# Patient Record
Sex: FEMALE | Race: WHITE | Employment: FULL TIME | URBAN - METROPOLITAN AREA
[De-identification: names, ages, dates, MRNs, and addresses within clinical notes are randomized per-mention and may not be internally consistent; named-entity substitution may affect disease eponyms.]

---

## 2018-07-20 ENCOUNTER — HOSPITAL ENCOUNTER (EMERGENCY)
Age: 33
Discharge: HOME | End: 2018-07-20
Payer: MEDICARE

## 2018-07-20 VITALS
OXYGEN SATURATION: 99 % | RESPIRATION RATE: 20 BRPM | BODY MASS INDEX: 36.88 KG/M2 | HEIGHT: 67 IN | SYSTOLIC BLOOD PRESSURE: 144 MMHG | TEMPERATURE: 97.1 F | WEIGHT: 235 LBS | HEART RATE: 89 BPM | DIASTOLIC BLOOD PRESSURE: 100 MMHG

## 2018-07-20 DIAGNOSIS — R22.0 LEFT FACIAL SWELLING: ICD-10-CM

## 2018-07-20 DIAGNOSIS — R03.0 ELEVATED BLOOD PRESSURE READING: ICD-10-CM

## 2018-07-20 DIAGNOSIS — K08.89 DENTALGIA: Primary | ICD-10-CM

## 2018-07-20 PROCEDURE — 99282 EMERGENCY DEPT VISIT SF MDM: CPT

## 2018-07-20 RX ORDER — HYDROCODONE BITARTRATE AND ACETAMINOPHEN 5; 325 MG/1; MG/1
1 TABLET ORAL EVERY 8 HOURS PRN
Qty: 15 TABLET | Refills: 0 | Status: SHIPPED | OUTPATIENT
Start: 2018-07-20 | End: 2018-07-25

## 2018-07-20 RX ORDER — CLINDAMYCIN HYDROCHLORIDE 300 MG/1
300 CAPSULE ORAL 3 TIMES DAILY
COMMUNITY
End: 2018-08-10

## 2018-07-20 ASSESSMENT — PAIN DESCRIPTION - ORIENTATION: ORIENTATION: LEFT

## 2018-07-20 ASSESSMENT — PAIN SCALES - GENERAL: PAINLEVEL_OUTOF10: 10

## 2018-07-20 ASSESSMENT — PAIN DESCRIPTION - LOCATION: LOCATION: TEETH

## 2018-07-20 NOTE — ED PROVIDER NOTES
Not on file. Social History Main Topics    Smoking status: Current Every Day Smoker     Packs/day: 1.00    Smokeless tobacco: Never Used    Alcohol use Yes      Comment: occasion    Drug use: Yes     Types: Marijuana      Comment: hasnt lately    Sexual activity: Not on file     Other Topics Concern    Not on file     Social History Narrative    No narrative on file     No current facility-administered medications for this encounter. Current Outpatient Prescriptions   Medication Sig Dispense Refill    clindamycin (CLEOCIN) 300 MG capsule Take 300 mg by mouth 3 times daily      HYDROcodone-acetaminophen (NORCO) 5-325 MG per tablet Take 1 tablet by mouth every 8 hours as needed for Pain for up to 5 days. . 15 tablet 0    benzocaine (ORAJEL) 10 % mucosal gel Take by mouth as needed. 1 Tube 0    ibuprofen (ADVIL;MOTRIN) 600 MG tablet Take 1 tablet by mouth every 6 hours as needed for Pain 40 tablet 0    norgestimate-ethinyl estradiol (SPRINTEC 28) 0.25-35 MG-MCG per tablet Take 1 tablet by mouth daily      FLUoxetine (PROZAC) 40 MG capsule Take 40 mg by mouth daily        Allergies   Allergen Reactions    Penicillins Nausea And Vomiting       REVIEW OF SYSTEMS  6 systems reviewed, pertinent positives per HPI otherwise noted to be negative    PHYSICAL EXAM  BP (!) 144/100   Pulse 89   Temp 97.1 °F (36.2 °C)   Resp 20   Ht 5' 7\" (1.702 m)   Wt 235 lb (106.6 kg)   SpO2 99%   Breastfeeding? No   BMI 36.81 kg/m²   GENERAL APPEARANCE: Awake and alert. Well-developed. Well-nourished. Non-toxic. Cooperative. No acute distress. HEAD: Normocephalic. Atraumatic. EYES: PERRL. EOM's grossly intact. Bilateral conjunctiva are clear and without exudate. ENT: Mucous membranes are moist. Posterior oropharynx is without erythema or exudate, uvula is midline. Bilateral tympanic membranes are clear. Tenderness with palpation to the left lower molars, primarily tooth #19.  No surrounding induration or fluctuance. No obvious abscess formation. Floor of the mouth is soft. Negative trismus. NECK: Supple. Normal ROM. No lymphadenopathy . CHEST: Equal symmetric chest rise. LUNGS: Breathing is unlabored. Speaking comfortably in full sentences. Equal, symmetric chest rise. Speaking comfortably in full sentences. Abdomen: Non distended  EXTREMITIES: MAEE. No acute deformities. SKIN: Warm and dry. Pink. Very mild left sided facial edema, no erythema or lymphangitic streaking. No acute rashes. NEUROLOGICAL: Alert and oriented x3. Strength is 5/5 in all extremities and sensation is intact. ED COURSE  I have independently evaluated this patient. Dr. Marline Aldana was available for consultation. Patient presents for evaluation of dental pain, no obvious abscess formation or erythema. Afebrile. Noted with elevated blood pressure. Vitals otherwise stable. There is mild left-sided cheek edema. Afebrile. Vitals are stable. No nausea or vomiting. Pain management was offered to the patient in the emergency department; however, she stated that she needed to be able to drive. A discussion was had with the patient regarding plan of care. Patient will be discharged home with a prescription for analgesics. Instructed that she may continue ibuprofen. Encouraged to continue taking the antibiotics as previously prescribed as well as warm/cold compresses. Instructed to follow up with dentist in 2-3 days. Patient was additionally encouraged to follow up with her primary care physician for repeat blood pressure check in several days. Certainly, the elevated blood pressure could be related to her discomfort. Return precautions were discussed. Patient verbalizes understanding, all questions were answered and is agreeable with the plan of care. Patient will follow up with dentist for further evaluation/treatment. Patient will return to ED for new/worsening symptoms.     Patient was given scripts for the following medications. I counseled patient how to take these medications. New Prescriptions    BENZOCAINE (ORAJEL) 10 % MUCOSAL GEL    Take by mouth as needed. HYDROCODONE-ACETAMINOPHEN (NORCO) 5-325 MG PER TABLET    Take 1 tablet by mouth every 8 hours as needed for Pain for up to 5 days. [facility]  No results found for this visit on 07/20/18. I estimate there is LOW risk for a ANAPHYLAXIS, DEEP SPACE INFECTION (e.g., EDWINAS ANGINA OR RETROPHARYNGEAL ABSCESS), EPIGLOTTITIS, MENINGITIS, or AIRWAY COMPROMISE, thus I consider the discharge disposition reasonable. Also, there is no evidence or peritonitis, sepsis, or toxicity. Janay Fong and I have discussed the diagnosis and risks, and we agree with discharging home to follow-up with their primary doctor. We also discussed returning to the Emergency Department immediately if new or worsening symptoms occur. We have discussed the symptoms which are most concerning (e.g., changing or worsening pain, trouble swallowing or breathing, neck stiffness or fever) that necessitate immediate return. Final Impression    1. Dentalgia    2. Elevated blood pressure reading    3. Left facial swelling        Discharge Vital Signs:  Blood pressure (!) 144/100, pulse 89, temperature 97.1 °F (36.2 °C), resp. rate 20, height 5' 7\" (1.702 m), weight 235 lb (106.6 kg), SpO2 99 %, not currently breastfeeding. DISPOSITION  Patient was discharged to home in good condition. DISCLAIMER:  Please note this report has been produced using speech recognition Dragon software and may contain errors related to that system including errors in grammar, punctuation, and spelling, as well as words and phrases that may be inappropriate. If there are any questions or concerns please feel free to contact the dictating provider for clarification.                                                                             Dariela Arevalo, JAYE - CNP  07/20/18 [id number]

## 2018-07-20 NOTE — ED NOTES
Discharge instructions reviewed with Ms. Dawna Levin. She verbalized understanding. Copy of discharge instructions and prescriptions given. Ms. Dawna Levin was discharged to home in good condition per personal vehicle, friend/family driving. She exited the ED without difficulty.         Glendy Ayala RN  07/20/18 [id number]

## 2018-08-10 ENCOUNTER — OFFICE VISIT (OUTPATIENT)
Dept: FAMILY MEDICINE CLINIC | Age: 33
End: 2018-08-10

## 2018-08-10 VITALS
BODY MASS INDEX: 37.9 KG/M2 | DIASTOLIC BLOOD PRESSURE: 62 MMHG | SYSTOLIC BLOOD PRESSURE: 112 MMHG | WEIGHT: 242 LBS | OXYGEN SATURATION: 98 % | HEART RATE: 100 BPM

## 2018-08-10 DIAGNOSIS — Z13.220 LIPID SCREENING: ICD-10-CM

## 2018-08-10 DIAGNOSIS — F17.200 TOBACCO USE DISORDER: ICD-10-CM

## 2018-08-10 DIAGNOSIS — R73.9 HYPERGLYCEMIA: ICD-10-CM

## 2018-08-10 DIAGNOSIS — F41.9 ANXIETY AND DEPRESSION: Primary | ICD-10-CM

## 2018-08-10 DIAGNOSIS — F32.A ANXIETY AND DEPRESSION: Primary | ICD-10-CM

## 2018-08-10 LAB
A/G RATIO: 1.6 (ref 1.1–2.2)
ALBUMIN SERPL-MCNC: 4.1 G/DL (ref 3.4–5)
ALP BLD-CCNC: 66 U/L (ref 40–129)
ALT SERPL-CCNC: 19 U/L (ref 10–40)
ANION GAP SERPL CALCULATED.3IONS-SCNC: 13 MMOL/L (ref 3–16)
AST SERPL-CCNC: 12 U/L (ref 15–37)
BILIRUB SERPL-MCNC: 0.5 MG/DL (ref 0–1)
BUN BLDV-MCNC: 10 MG/DL (ref 7–20)
CALCIUM SERPL-MCNC: 8.9 MG/DL (ref 8.3–10.6)
CHLORIDE BLD-SCNC: 106 MMOL/L (ref 99–110)
CHOLESTEROL, TOTAL: 174 MG/DL (ref 0–199)
CO2: 23 MMOL/L (ref 21–32)
CREAT SERPL-MCNC: 0.6 MG/DL (ref 0.6–1.1)
GFR AFRICAN AMERICAN: >60
GFR NON-AFRICAN AMERICAN: >60
GLOBULIN: 2.5 G/DL
GLUCOSE BLD-MCNC: 95 MG/DL (ref 70–99)
HDLC SERPL-MCNC: 51 MG/DL (ref 40–60)
LDL CHOLESTEROL CALCULATED: 100 MG/DL
POTASSIUM SERPL-SCNC: 4.4 MMOL/L (ref 3.5–5.1)
SODIUM BLD-SCNC: 142 MMOL/L (ref 136–145)
TOTAL PROTEIN: 6.6 G/DL (ref 6.4–8.2)
TRIGL SERPL-MCNC: 115 MG/DL (ref 0–150)
TSH REFLEX FT4: 1.2 UIU/ML (ref 0.27–4.2)
VLDLC SERPL CALC-MCNC: 23 MG/DL

## 2018-08-10 PROCEDURE — 36415 COLL VENOUS BLD VENIPUNCTURE: CPT | Performed by: FAMILY MEDICINE

## 2018-08-10 PROCEDURE — 99203 OFFICE O/P NEW LOW 30 MIN: CPT | Performed by: FAMILY MEDICINE

## 2018-08-10 RX ORDER — FLUOXETINE HYDROCHLORIDE 20 MG/1
20 CAPSULE ORAL
COMMUNITY
Start: 2017-11-15 | End: 2018-08-10 | Stop reason: SDUPTHER

## 2018-08-10 RX ORDER — FLUOXETINE HYDROCHLORIDE 40 MG/1
40 CAPSULE ORAL DAILY
Qty: 30 CAPSULE | Refills: 0 | Status: SHIPPED | OUTPATIENT
Start: 2018-08-10 | End: 2018-09-06 | Stop reason: SDUPTHER

## 2018-08-10 ASSESSMENT — PATIENT HEALTH QUESTIONNAIRE - PHQ9
SUM OF ALL RESPONSES TO PHQ QUESTIONS 1-9: 2
SUM OF ALL RESPONSES TO PHQ QUESTIONS 1-9: 2
1. LITTLE INTEREST OR PLEASURE IN DOING THINGS: 1
2. FEELING DOWN, DEPRESSED OR HOPELESS: 1
SUM OF ALL RESPONSES TO PHQ9 QUESTIONS 1 & 2: 2

## 2018-08-10 ASSESSMENT — ENCOUNTER SYMPTOMS: SHORTNESS OF BREATH: 0

## 2018-08-10 NOTE — PATIENT INSTRUCTIONS
Please make an appointment with with Dr. Anitra Singh. Tips to Help You Stop Smoking       Cigarette smoking is a preventable cause of death in the [place]. If you have thought about quitting but haven't been able to, here are some reasons why you should and some ways to do it. Here's Why   Quitting smoking now can decrease your risk of getting smoking-related illnesses like:   Heart disease   Stroke   Several types of cancer, including:   Lung   Mouth   Esophagus   Larynx   Bladder   Pancreas   Kidney   Chronic lung diseases:   Bronchitis   Emphysema   Asthma   Cataracts   Macular degeneration   Thyroid conditions   Hearing loss   Erectile dysfunction   Dementia   Osteoporosis   Here's How   Once you've decided to quit smoking, set your target quit date a few weeks away. In the time leading up to your quit day, try some of these ideas offered by the [address] to help you successfully quit smoking. For the best results, work with your doctor. Together, you can test your lung function and compare the results to those of a nonsmoking person. The results can be given to you as your lung age. Finding out your lung age right after having the test done may help you to stop smoking. Your doctor can also discuss with you all of your options and refer you to smoking-cessation support groups. You may wish to use nicotine replacement (gum, patches, inhaler) or one of the prescription medications that have been shown to increase quit rates and prolong abstinence from smoking. But whatever you and your doctor decide on these matters, it will still be you who decides when an how to quit. Here are some techniques:   Switch Brands   Switch to a brand you find distasteful. Change to a brand that is low in tar and nicotine a couple of weeks before your target quit date. This will help change your smoking behavior.  However, do not smoke more cigarettes, inhale them more

## 2018-08-10 NOTE — PROGRESS NOTES
provided    Lipid screening  -     Lipid Panel  -     Comprehensive Metabolic Panel    Hyperglycemia  -     Hemoglobin A1C          Plan:   See orders and medications filed with this encounter. Return in about 4 weeks (around 9/7/2018) for or sooner if needed.

## 2018-08-11 LAB
ESTIMATED AVERAGE GLUCOSE: 99.7 MG/DL
HBA1C MFR BLD: 5.1 %

## 2018-08-13 ENCOUNTER — OFFICE VISIT (OUTPATIENT)
Dept: PSYCHOLOGY | Age: 33
End: 2018-08-13

## 2018-08-13 DIAGNOSIS — F32.A DEPRESSION, UNSPECIFIED DEPRESSION TYPE: ICD-10-CM

## 2018-08-13 DIAGNOSIS — F41.9 ANXIETY: Primary | ICD-10-CM

## 2018-08-13 PROCEDURE — 90791 PSYCH DIAGNOSTIC EVALUATION: CPT | Performed by: PSYCHOLOGIST

## 2018-08-13 ASSESSMENT — ANXIETY QUESTIONNAIRES
2. NOT BEING ABLE TO STOP OR CONTROL WORRYING: 3-NEARLY EVERY DAY
7. FEELING AFRAID AS IF SOMETHING AWFUL MIGHT HAPPEN: 1-SEVERAL DAYS
GAD7 TOTAL SCORE: 5
4. TROUBLE RELAXING: 0-NOT AT ALL SURE
1. FEELING NERVOUS, ANXIOUS, OR ON EDGE: 0-NOT AT ALL SURE
6. BECOMING EASILY ANNOYED OR IRRITABLE: 0-NOT AT ALL SURE
5. BEING SO RESTLESS THAT IT IS HARD TO SIT STILL: 0-NOT AT ALL SURE
3. WORRYING TOO MUCH ABOUT DIFFERENT THINGS: 1-SEVERAL DAYS

## 2018-08-13 ASSESSMENT — PATIENT HEALTH QUESTIONNAIRE - PHQ9
3. TROUBLE FALLING OR STAYING ASLEEP: 2
2. FEELING DOWN, DEPRESSED OR HOPELESS: 2
6. FEELING BAD ABOUT YOURSELF - OR THAT YOU ARE A FAILURE OR HAVE LET YOURSELF OR YOUR FAMILY DOWN: 3
SUM OF ALL RESPONSES TO PHQ9 QUESTIONS 1 & 2: 4
1. LITTLE INTEREST OR PLEASURE IN DOING THINGS: 2
4. FEELING TIRED OR HAVING LITTLE ENERGY: 3
SUM OF ALL RESPONSES TO PHQ QUESTIONS 1-9: 12
7. TROUBLE CONCENTRATING ON THINGS, SUCH AS READING THE NEWSPAPER OR WATCHING TELEVISION: 0
SUM OF ALL RESPONSES TO PHQ QUESTIONS 1-9: 12
8. MOVING OR SPEAKING SO SLOWLY THAT OTHER PEOPLE COULD HAVE NOTICED. OR THE OPPOSITE, BEING SO FIGETY OR RESTLESS THAT YOU HAVE BEEN MOVING AROUND A LOT MORE THAN USUAL: 0
5. POOR APPETITE OR OVEREATING: 0
9. THOUGHTS THAT YOU WOULD BE BETTER OFF DEAD, OR OF HURTING YOURSELF: 0

## 2018-08-13 NOTE — PROGRESS NOTES
Behavioral Health Consultation  Marshal Hewitt, Ph.D.  Psychologist  8/14/2018  10:41 AM      Time spent with Patient: 30 minutes  This is patient's first  [facility] appointment. Reason for Consult:    Chief Complaint   Patient presents with    Depression    Anxiety     Referring Provider: Jayson Hampton MD  [address] [facility] [facility] [address], [address]    Pt provided informed consent for the behavioral health program. Discussed with patient model of service to include the limits of confidentiality (i.e. abuse reporting, suicide intervention, etc.) and short-term intervention focused approach. Pt indicated understanding. Feedback given to PCP. S:  Dad had a stroke in December of 2015. Patient was raised by her father, they were very close. He has not recovered much functioning after the stroke, language is poor, requires lots of care. Patient feels like her mood and functioning have decreased since then. Other stressors include losing her job earlier this year, wrongfully terminated. Has gained 70 pounds in about 3 years. Notes a pattern of stress eating, impact on her self-esteem. Has decreased motivation, increased need for sleep, decreased self-care, isolating herself more socially, feeling ashamed. Does have some job interviews lined up, but feels like her plan has been thrown off by changes. Has been taking Prozac for about 5 years, recently increased to 40 mg. Patient lives with her mother and a cat. Daily routine varies. Often stays in bed most days. Diet is inconsistent, eats for comfort. Infrequent caffeine use, smokes 1 ppd, drinks alcohol a few times a month, smokes marijuana on a daily basis. Family history is positive for bipolar disorder, anxiety.      O:  MSE:    Appearance    alert, cooperative, crying  Appetite abnormal: increased  Sleep disturbance Yes  Fatigue No  Loss of pleasure No  Impulsive behavior No  Speech    spontaneous, normal rate and normal volume  Mood    Depressed   Affect depressed affect  Thought Content    intrusive thoughts and all or nothing thinking  Thought Process    linear, goal directed and coherent  Associations    logical connections  Insight    Good  Judgment    Intact  Orientation    oriented to person, place, time, and general circumstances  Memory    recent and remote memory intact  Attention/Concentration    intact  Morbid ideation No  Suicide Assessment    no suicidal ideation    History:    Medications:   Current Outpatient Prescriptions   Medication Sig Dispense Refill    Melatonin 5 MG CAPS Take by mouth      FLUoxetine (PROZAC) 40 MG capsule Take 1 capsule by mouth daily 30 capsule 0    ibuprofen (ADVIL;MOTRIN) 600 MG tablet Take 1 tablet by mouth every 6 hours as needed for Pain 40 tablet 0    norgestimate-ethinyl estradiol (SPRINTEC 28) 0.25-35 MG-MCG per tablet Take 1 tablet by mouth daily       No current facility-administered medications for this visit. Social History:   Social History     Social History    Marital status: Single     Spouse name: N/A    Number of children: N/A    Years of education: N/A     Occupational History    Not on file. Social History Main Topics    Smoking status: Current Every Day Smoker     Packs/day: 1.00    Smokeless tobacco: Never Used    Alcohol use Yes      Comment: occasion    Drug use: Yes     Types: Marijuana      Comment: hasnt lately    Sexual activity: Not on file     Other Topics Concern    Not on file     Social History Narrative    No narrative on file     TOBACCO:   reports that she has been smoking. She has been smoking about 1.00 pack per day. She has never used smokeless tobacco.  ETOH:   reports that she drinks alcohol. Family History:   Family History   Problem Relation Age of Onset    Stroke Father 62    Diabetes Sister 23        type 1     Diabetes Paternal Grandmother      A:  Administered PHQ-9 and GRETA-7 (see below).  Patient endorses moderate symptoms of depression and mild symptoms of anxiety. Denies SI. Insight and motivation are good. PHQ Scores 8/13/2018 8/10/2018   PHQ2 Score 4 2   PHQ9 Score 12 2     Interpretation of Total Score Depression Severity: 1-4 = Minimal depression, 5-9 = Mild depression, 10-14 = Moderate depression, 15-19 = Moderately severe depression, 20-27 = Severe depression    GRETA 7 SCORE 8/13/2018   GRETA-7 Total Score 5     Interpretation of GRETA-7 score: 5-9 = mild anxiety, 10-14 = moderate anxiety, 15+ = severe anxiety. Recommend referral to behavioral health for scores 10 or greater. Diagnosis:    1. Anxiety    2.  Depression, unspecified depression type          Diagnosis Date    Cataract     Genital herpes     one outbreak in 2007    Kidney stone     Pityriasis versicolor      Plan:  Pt interventions:  Established rapport, Conducted functional assessment, [place]-setting to identify pt's primary goals for [facility] visit / overall health, Supportive techniques and Emphasized self-care as important for managing overall health    Pt Behavioral Change Plan:   See Pt Instructions

## 2018-08-27 ENCOUNTER — OFFICE VISIT (OUTPATIENT)
Dept: PSYCHOLOGY | Age: 33
End: 2018-08-27

## 2018-08-27 DIAGNOSIS — F32.A DEPRESSION, UNSPECIFIED DEPRESSION TYPE: ICD-10-CM

## 2018-08-27 DIAGNOSIS — F41.9 ANXIETY: Primary | ICD-10-CM

## 2018-08-27 PROCEDURE — 90832 PSYTX W PT 30 MINUTES: CPT | Performed by: PSYCHOLOGIST

## 2018-09-06 DIAGNOSIS — F41.9 ANXIETY AND DEPRESSION: ICD-10-CM

## 2018-09-06 DIAGNOSIS — F32.A ANXIETY AND DEPRESSION: ICD-10-CM

## 2018-09-06 RX ORDER — FLUOXETINE HYDROCHLORIDE 40 MG/1
CAPSULE ORAL
Qty: 30 CAPSULE | Refills: 0 | Status: SHIPPED | OUTPATIENT
Start: 2018-09-06 | End: 2018-09-14 | Stop reason: SDUPTHER

## 2018-09-14 ENCOUNTER — OFFICE VISIT (OUTPATIENT)
Dept: FAMILY MEDICINE CLINIC | Age: 33
End: 2018-09-14

## 2018-09-14 ENCOUNTER — OFFICE VISIT (OUTPATIENT)
Dept: PSYCHOLOGY | Age: 33
End: 2018-09-14

## 2018-09-14 VITALS
WEIGHT: 254 LBS | BODY MASS INDEX: 39.87 KG/M2 | HEART RATE: 75 BPM | DIASTOLIC BLOOD PRESSURE: 80 MMHG | HEIGHT: 67 IN | SYSTOLIC BLOOD PRESSURE: 130 MMHG | OXYGEN SATURATION: 98 %

## 2018-09-14 DIAGNOSIS — Z11.4 ENCOUNTER FOR SCREENING FOR HIV: ICD-10-CM

## 2018-09-14 DIAGNOSIS — F32.A DEPRESSION, UNSPECIFIED DEPRESSION TYPE: ICD-10-CM

## 2018-09-14 DIAGNOSIS — L40.9 PSORIASIS: ICD-10-CM

## 2018-09-14 DIAGNOSIS — F41.9 ANXIETY: Primary | ICD-10-CM

## 2018-09-14 DIAGNOSIS — F41.9 ANXIETY AND DEPRESSION: Primary | ICD-10-CM

## 2018-09-14 DIAGNOSIS — F32.A ANXIETY AND DEPRESSION: Primary | ICD-10-CM

## 2018-09-14 DIAGNOSIS — Z23 NEED FOR VACCINATION FOR H FLU TYPE B: ICD-10-CM

## 2018-09-14 PROCEDURE — 90686 IIV4 VACC NO PRSV 0.5 ML IM: CPT | Performed by: FAMILY MEDICINE

## 2018-09-14 PROCEDURE — 4004F PT TOBACCO SCREEN RCVD TLK: CPT | Performed by: FAMILY MEDICINE

## 2018-09-14 PROCEDURE — G8427 DOCREV CUR MEDS BY ELIG CLIN: HCPCS | Performed by: FAMILY MEDICINE

## 2018-09-14 PROCEDURE — 90832 PSYTX W PT 30 MINUTES: CPT | Performed by: PSYCHOLOGIST

## 2018-09-14 PROCEDURE — 90471 IMMUNIZATION ADMIN: CPT | Performed by: FAMILY MEDICINE

## 2018-09-14 PROCEDURE — G8417 CALC BMI ABV UP PARAM F/U: HCPCS | Performed by: FAMILY MEDICINE

## 2018-09-14 PROCEDURE — 99213 OFFICE O/P EST LOW 20 MIN: CPT | Performed by: FAMILY MEDICINE

## 2018-09-14 RX ORDER — FLUOXETINE HYDROCHLORIDE 40 MG/1
CAPSULE ORAL
Qty: 30 CAPSULE | Refills: 3 | Status: SHIPPED | OUTPATIENT
Start: 2018-09-14 | End: 2019-02-05 | Stop reason: SDUPTHER

## 2018-09-14 RX ORDER — CALCIPOTRIENE 50 UG/G
OINTMENT TOPICAL
Qty: 120 G | Refills: 0 | Status: SHIPPED | OUTPATIENT
Start: 2018-09-14 | End: 2019-04-09 | Stop reason: SDUPTHER

## 2018-09-14 ASSESSMENT — ENCOUNTER SYMPTOMS: SHORTNESS OF BREATH: 0

## 2018-09-14 NOTE — PROGRESS NOTES
28) 0.25-35 MG-MCG per tablet Take 1 tablet by mouth daily       No current facility-administered medications for this visit. Social History:   Social History     Social History    Marital status: Single     Spouse name: N/A    Number of children: N/A    Years of education: N/A     Occupational History    Not on file. Social History Main Topics    Smoking status: Current Every Day Smoker     Packs/day: 1.00    Smokeless tobacco: Never Used    Alcohol use Yes      Comment: occasion    Drug use: Yes     Types: Marijuana      Comment: hasnt lately    Sexual activity: Not on file     Other Topics Concern    Not on file     Social History Narrative    No narrative on file     TOBACCO:   reports that she has been smoking. She has been smoking about 1.00 pack per day. She has never used smokeless tobacco.  ETOH:   reports that she drinks alcohol. Family History:   Family History   Problem Relation Age of Onset    Stroke Father 62    Diabetes Sister 23        type 1     Diabetes Paternal Grandmother      A:  Patient engaged and cooperative. Denies SI. Insight and motivation are good. Diagnosis:    1. Anxiety    2.  Depression, unspecified depression type          Diagnosis Date    Cataract     Genital herpes     one outbreak in 2007    Kidney stone     Pityriasis versicolor      Plan:  Pt interventions:  Trained in strategies for increasing balanced thinking, [place]-setting to identify pt's primary goals for [facility] visit / overall health, Supportive techniques, Emphasized self-care as important for managing overall health, Cognitive strategies to target anxiety including cognitive restructuring and Identified maladaptive thoughts    Pt Behavioral Change Plan:   See Pt Instructions

## 2018-09-14 NOTE — PROGRESS NOTES
education: N/A     Social History Main Topics    Smoking status: Current Every Day Smoker     Packs/day: 1.00    Smokeless tobacco: Never Used    Alcohol use Yes      Comment: occasion    Drug use: Yes     Types: Marijuana      Comment: hasnt lately    Sexual activity: Not on file     Other Topics Concern    Not on file     Social History Narrative    No narrative on file     Family History   Problem Relation Age of Onset    Stroke Father 62    Diabetes Sister 23        type 1     Diabetes Paternal Grandmother                               Review Of Systems    Review of Systems   Constitutional: Negative for diaphoresis and fever. Respiratory: Negative for shortness of breath. Cardiovascular: Negative for chest pain. PHYSICAL EXAMINATION:    /80   Pulse 75   Ht 5' 7\" (1.702 m)   Wt 254 lb (115.2 kg)   LMP 09/10/2018   SpO2 98%   BMI 39.78 kg/m²     Physical Exam   Constitutional: She is oriented to person, place, and time. She appears well-developed and well-nourished. No distress. HENT:   Head: Normocephalic and atraumatic. Right Ear: External ear normal.   Left Ear: External ear normal.   Eyes: Conjunctivae and EOM are normal.   Cardiovascular: Normal rate, regular rhythm and normal heart sounds. Pulmonary/Chest: Effort normal and breath sounds normal. No respiratory distress. She has no wheezes. She has no rales. Neurological: She is alert and oriented to person, place, and time. Skin: Skin is warm and dry. She is not diaphoretic. Vitals reviewed. ASSESSMENT:   Well Adult, See encounter diagnoses  Claudia Carroll was seen today for depression. Diagnoses and all orders for this visit:    Anxiety and depression  Recommended making a visit with catee NP for nutrition counseling and trying to engage in regular aerobic exercise. OCP was also changed and she admits previously to stress eating. I don't want to increase or decrease her prozac at this time.  Continue to see

## 2018-09-15 LAB
HIV AG/AB: NORMAL
HIV ANTIGEN: NORMAL
HIV-1 ANTIBODY: NORMAL
HIV-2 AB: NORMAL

## 2018-09-26 ENCOUNTER — OFFICE VISIT (OUTPATIENT)
Dept: FAMILY MEDICINE CLINIC | Age: 33
End: 2018-09-26
Payer: MEDICARE

## 2018-09-26 VITALS
SYSTOLIC BLOOD PRESSURE: 94 MMHG | HEART RATE: 96 BPM | DIASTOLIC BLOOD PRESSURE: 56 MMHG | OXYGEN SATURATION: 98 % | WEIGHT: 247 LBS | TEMPERATURE: 98.5 F | BODY MASS INDEX: 38.69 KG/M2

## 2018-09-26 DIAGNOSIS — F33.42 RECURRENT MAJOR DEPRESSIVE DISORDER, IN FULL REMISSION (HCC): Primary | ICD-10-CM

## 2018-09-26 DIAGNOSIS — E66.09 CLASS 2 OBESITY DUE TO EXCESS CALORIES WITHOUT SERIOUS COMORBIDITY WITH BODY MASS INDEX (BMI) OF 38.0 TO 38.9 IN ADULT: ICD-10-CM

## 2018-09-26 PROCEDURE — 99214 OFFICE O/P EST MOD 30 MIN: CPT | Performed by: NURSE PRACTITIONER

## 2018-09-26 PROCEDURE — G8427 DOCREV CUR MEDS BY ELIG CLIN: HCPCS | Performed by: NURSE PRACTITIONER

## 2018-09-26 PROCEDURE — 4004F PT TOBACCO SCREEN RCVD TLK: CPT | Performed by: NURSE PRACTITIONER

## 2018-09-26 PROCEDURE — G8417 CALC BMI ABV UP PARAM F/U: HCPCS | Performed by: NURSE PRACTITIONER

## 2018-09-26 ASSESSMENT — ENCOUNTER SYMPTOMS
DIARRHEA: 0
CONSTIPATION: 0
NAUSEA: 0
BLOOD IN STOOL: 0
VOMITING: 0
WHEEZING: 0
SHORTNESS OF BREATH: 0
COUGH: 0

## 2018-09-26 NOTE — PROGRESS NOTES
Subjective:      Patient ID: Nina Graham is a 29 y.o. female. HPI Pt is here for nutrition counseling, pt does suffer from depression which is well controlled. New Diet Counselin. What do you do for a living?    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 2. Do you eat out a lot or cook?    - pt was eating out a lot of fast food, pt has been cooking, she has not eating out since last time she saw dr Rakan Laguna. 3. What kind of foods do you like?    - Pt will eat anything. 4. What is your nutrition background?    - pt feels like she feels like she should have clarification. 5. How much do you work out currently?    - Pt started walking around her apartment complex, pt is doing 2 laps at a time, pt was not working out but recently started. 6. What is a good goal weight from your perspective? - Pt is a good goal weight 190. Pt first goal is 225   7. Do you have a family and kids? - Pt does not have kids. Pt lives with mom. 8. How much water do you drink?   - Pt does drink well over 64 ounces   9. Do you drink soda? If so how much?   - pt was 3 soda a week. 10. What challenges have you had in the past for losing weight? What worked what did not work?    - Pt has not tried to loose weight in the past, pt never really worried about weight, pt was  which she was on her feet. Pt has always been a bigger girl. 11. Do you understand how to read a food label?    - yes, calories, carbs and sodium. 12. How many fruits and vegetables do you eat currently?   - cup of raw vegetables. 13. Do you eat breakfast?   - Pt does eat breakfast recently, pt does multi grain bread, with advocado, grapefruit, pt is measuring cups of food. 14. Do you eat late at night? How long before bed do you eat?    - pt does eat with in two hours of bed. 15. Do you understand how to measure heart rate?    - pt does not   16. What are your short term and long term goals?   to lose weight, decrease a1c, help hypertension, become more active, run a race  16. Do you drink caffeine? If so how much? And what is the caffeine, Coffee or tea? Cream and sugar?    - coffee. 18. What do they cook with? What kind of oil, butter?    - olive oil, avocado. 19. What are barriers you have for working out? What prevents you from getting to work out regularly?    - no. 20. What do you do when youre stressed out? Do you eat if so what kind of foods do you eat?    - pt is a board, salty. Review of Systems   Respiratory: Negative for cough, shortness of breath and wheezing. Gastrointestinal: Negative for blood in stool, constipation, diarrhea, nausea and vomiting. All other systems reviewed and are negative. Objective:   Physical Exam   Constitutional: She is oriented to person, place, and time. She appears well-developed and well-nourished. Cardiovascular: Normal rate, regular rhythm and normal heart sounds. No murmur heard. Pulmonary/Chest: Effort normal and breath sounds normal. No respiratory distress. She has no wheezes. She exhibits no tenderness. Neurological: She is alert and oriented to person, place, and time. Skin: Skin is warm and dry. Psychiatric: She has a normal mood and affect. Her behavior is normal. Judgment and thought content normal.   Vitals reviewed. Assessment:       Diagnosis Orders   1. Recurrent major depressive disorder, in full remission ([facility] [address]     2. Class 2 obesity due to excess calories without serious comorbidity with body mass index (BMI) of 38.0 to 38.9 in adult             Plan:      Ce Butler was seen today for nutrition counseling. Diagnoses and all orders for this visit:    Recurrent major depressive disorder, in full remission ([facility] [address]- pt is doing well, does feel that her weight does play a little role and weight loss will help.      Class 2 obesity due to excess calories without serious comorbidity with body mass index (BMI) of 38.0 to 38.9 in adult  - pt will

## 2018-11-02 ENCOUNTER — OFFICE VISIT (OUTPATIENT)
Dept: FAMILY MEDICINE CLINIC | Age: 33
End: 2018-11-02
Payer: COMMERCIAL

## 2018-11-02 VITALS
SYSTOLIC BLOOD PRESSURE: 104 MMHG | DIASTOLIC BLOOD PRESSURE: 54 MMHG | TEMPERATURE: 97.8 F | BODY MASS INDEX: 39.63 KG/M2 | HEART RATE: 65 BPM | OXYGEN SATURATION: 98 % | WEIGHT: 253 LBS

## 2018-11-02 DIAGNOSIS — E66.01 CLASS 2 SEVERE OBESITY DUE TO EXCESS CALORIES WITH SERIOUS COMORBIDITY AND BODY MASS INDEX (BMI) OF 39.0 TO 39.9 IN ADULT (HCC): ICD-10-CM

## 2018-11-02 DIAGNOSIS — F33.0 MILD EPISODE OF RECURRENT MAJOR DEPRESSIVE DISORDER (HCC): Primary | ICD-10-CM

## 2018-11-02 PROCEDURE — 99214 OFFICE O/P EST MOD 30 MIN: CPT | Performed by: NURSE PRACTITIONER

## 2018-11-02 ASSESSMENT — ENCOUNTER SYMPTOMS
DIARRHEA: 0
CONSTIPATION: 0
BLOOD IN STOOL: 0
NAUSEA: 0

## 2018-11-02 NOTE — PROGRESS NOTES
Subjective:      Patient ID: Kimi Devries is a 29 y.o. female. HPI Pt is here for nutrition she has gained 6 lbs. Pt has been on a budget and has not been able to eat \"healthier\". Review of Systems   Gastrointestinal: Negative for blood in stool, constipation, diarrhea and nausea. All other systems reviewed and are negative. Objective:   Physical Exam   Constitutional: She is oriented to person, place, and time. She appears well-developed and well-nourished. Neurological: She is alert and oriented to person, place, and time. Skin: Skin is warm and dry. Psychiatric: She has a normal mood and affect. Her behavior is normal. Judgment and thought content normal.   Vitals reviewed. Assessment:       Diagnosis Orders   1. Mild episode of recurrent major depressive disorder (HCC)     2. Class 2 severe obesity due to excess calories with serious comorbidity and body mass index (BMI) of 39.0 to 39.9 in [facility])             Plan:      Meka Estrella was seen today for nutrition counseling. Diagnoses and all orders for this visit:    Mild episode of recurrent major depressive disorder ([facility] [address]- pt is doing well with her mood. Pt feels like her job is doing well and good for her to be on a schedule. Pt is very happy, she likes her job. She likes the people she works with. Class 2 severe obesity due to excess calories with serious comorbidity and body mass index (BMI) of 39.0 to 39.9 in [facility])- pt has gained 6 lbs. Which she has not been calorie counting. Pt will follow up in 5 weeks.          Elvin Olson, APRN - CNP

## 2018-12-26 ENCOUNTER — OFFICE VISIT (OUTPATIENT)
Dept: FAMILY MEDICINE CLINIC | Age: 33
End: 2018-12-26
Payer: COMMERCIAL

## 2018-12-26 VITALS
DIASTOLIC BLOOD PRESSURE: 70 MMHG | HEIGHT: 67 IN | BODY MASS INDEX: 41.75 KG/M2 | WEIGHT: 266 LBS | SYSTOLIC BLOOD PRESSURE: 104 MMHG | OXYGEN SATURATION: 97 % | HEART RATE: 80 BPM

## 2018-12-26 DIAGNOSIS — F33.42 RECURRENT MAJOR DEPRESSIVE DISORDER, IN FULL REMISSION (HCC): Primary | ICD-10-CM

## 2018-12-26 DIAGNOSIS — E66.01 CLASS 3 SEVERE OBESITY DUE TO EXCESS CALORIES WITHOUT SERIOUS COMORBIDITY WITH BODY MASS INDEX (BMI) OF 40.0 TO 44.9 IN ADULT (HCC): ICD-10-CM

## 2018-12-26 PROBLEM — E66.813 CLASS 3 SEVERE OBESITY DUE TO EXCESS CALORIES WITHOUT SERIOUS COMORBIDITY WITH BODY MASS INDEX (BMI) OF 40.0 TO 44.9 IN ADULT: Status: ACTIVE | Noted: 2018-12-26

## 2018-12-26 PROCEDURE — 99213 OFFICE O/P EST LOW 20 MIN: CPT | Performed by: NURSE PRACTITIONER

## 2018-12-26 ASSESSMENT — ENCOUNTER SYMPTOMS
SHORTNESS OF BREATH: 0
DIARRHEA: 0
STRIDOR: 0
NAUSEA: 0
CONSTIPATION: 0
COUGH: 0

## 2018-12-26 NOTE — PROGRESS NOTES
Subjective:      Patient ID: Mary Andrew is a 29 y.o. female. HPI Pt is here for nutrition. Pt has gained 13 lbs. Pt cannot control self with eating. Review of Systems   Constitutional: Negative for diaphoresis, fatigue and fever. Respiratory: Negative for cough, shortness of breath and stridor. Gastrointestinal: Negative for constipation, diarrhea and nausea. All other systems reviewed and are negative. Objective:   Physical Exam   Constitutional: She is oriented to person, place, and time. She appears well-developed and well-nourished. Cardiovascular: Normal rate. Neurological: She is alert and oriented to person, place, and time. Skin: Skin is warm and dry. Capillary refill takes less than 2 seconds. Psychiatric: She has a normal mood and affect. Her behavior is normal. Judgment and thought content normal.   Vitals reviewed. Assessment:       Diagnosis Orders   1. Class 3 severe obesity due to excess calories without serious comorbidity with body mass index (BMI) of 40.0 to 44.9 in [facility])  [facility]   2. Recurrent major depressive disorder, in full remission ([facility].)             Plan:      Lizabeth Woodard was seen today for 1 month follow-up. Diagnoses and all orders for this visit:    Class 3 severe obesity due to excess calories without serious comorbidity with body mass index (BMI) of 40.0 to 44.9 in [facility])- pt is frustrated with weight gain, will [facility]. Pt will try to start calorie counting. Welcome to come see me but do not feel like I am helping the weight loss journey. Pt would benefit from seeing dr Lawson November but does not have the time off currently. -     [facility]    Recurrent major depressive disorder, in full remission ([facility]- pt is doing ok, tearful in office due to weight but denies wanting to hurt self.      More then 50% of time spent counseling patient for nutrition and body

## 2019-01-17 ENCOUNTER — OFFICE VISIT (OUTPATIENT)
Dept: BARIATRICS/WEIGHT MGMT | Age: 34
End: 2019-01-17
Payer: COMMERCIAL

## 2019-01-17 VITALS
SYSTOLIC BLOOD PRESSURE: 120 MMHG | HEIGHT: 67 IN | HEART RATE: 76 BPM | BODY MASS INDEX: 40.89 KG/M2 | WEIGHT: 260.5 LBS | DIASTOLIC BLOOD PRESSURE: 72 MMHG

## 2019-01-17 DIAGNOSIS — Z71.3 DIETARY COUNSELING AND SURVEILLANCE: ICD-10-CM

## 2019-01-17 DIAGNOSIS — F32.A DEPRESSION, UNSPECIFIED DEPRESSION TYPE: ICD-10-CM

## 2019-01-17 DIAGNOSIS — E66.01 MORBID OBESITY WITH BMI OF 40.0-44.9, ADULT (HCC): Primary | ICD-10-CM

## 2019-01-17 PROCEDURE — 99204 OFFICE O/P NEW MOD 45 MIN: CPT | Performed by: FAMILY MEDICINE

## 2019-01-17 ASSESSMENT — ENCOUNTER SYMPTOMS
GASTROINTESTINAL NEGATIVE: 1
RESPIRATORY NEGATIVE: 1
EYES NEGATIVE: 1

## 2019-02-05 DIAGNOSIS — F32.A ANXIETY AND DEPRESSION: ICD-10-CM

## 2019-02-05 DIAGNOSIS — F41.9 ANXIETY AND DEPRESSION: ICD-10-CM

## 2019-02-06 RX ORDER — FLUOXETINE HYDROCHLORIDE 40 MG/1
CAPSULE ORAL
Qty: 30 CAPSULE | Refills: 2 | Status: SHIPPED | OUTPATIENT
Start: 2019-02-06 | End: 2019-05-18 | Stop reason: SDUPTHER

## 2019-02-21 ENCOUNTER — OFFICE VISIT (OUTPATIENT)
Dept: FAMILY MEDICINE CLINIC | Age: 34
End: 2019-02-21
Payer: COMMERCIAL

## 2019-02-21 VITALS
DIASTOLIC BLOOD PRESSURE: 60 MMHG | BODY MASS INDEX: 42.13 KG/M2 | SYSTOLIC BLOOD PRESSURE: 120 MMHG | WEIGHT: 269 LBS | HEART RATE: 100 BPM | OXYGEN SATURATION: 100 % | TEMPERATURE: 97.8 F

## 2019-02-21 DIAGNOSIS — L40.9 PSORIASIS: ICD-10-CM

## 2019-02-21 DIAGNOSIS — J06.9 VIRAL URI: Primary | ICD-10-CM

## 2019-02-21 DIAGNOSIS — R52 GENERALIZED BODY ACHES: ICD-10-CM

## 2019-02-21 LAB
INFLUENZA A ANTIBODY: NORMAL
INFLUENZA B ANTIBODY: NORMAL

## 2019-02-21 PROCEDURE — 87804 INFLUENZA ASSAY W/OPTIC: CPT | Performed by: NURSE PRACTITIONER

## 2019-02-21 PROCEDURE — 99213 OFFICE O/P EST LOW 20 MIN: CPT | Performed by: NURSE PRACTITIONER

## 2019-02-21 RX ORDER — CALCIPOTRIENE 50 UG/G
CREAM TOPICAL
Qty: 1 TUBE | Refills: 4 | Status: SHIPPED | OUTPATIENT
Start: 2019-02-21 | End: 2019-07-25 | Stop reason: ALTCHOICE

## 2019-02-21 ASSESSMENT — ENCOUNTER SYMPTOMS
SINUS PAIN: 0
SINUS PRESSURE: 0
SORE THROAT: 0
SHORTNESS OF BREATH: 0
RHINORRHEA: 1
WHEEZING: 0
COUGH: 1

## 2019-04-09 ENCOUNTER — HOSPITAL ENCOUNTER (EMERGENCY)
Age: 34
Discharge: HOME | End: 2019-04-09
Attending: EMERGENCY MEDICINE
Payer: COMMERCIAL

## 2019-04-09 ENCOUNTER — APPOINTMENT (OUTPATIENT)
Dept: CT IMAGING | Age: 34
End: 2019-04-09
Payer: COMMERCIAL

## 2019-04-09 VITALS
WEIGHT: 260 LBS | SYSTOLIC BLOOD PRESSURE: 107 MMHG | DIASTOLIC BLOOD PRESSURE: 69 MMHG | OXYGEN SATURATION: 98 % | HEART RATE: 70 BPM | TEMPERATURE: 98.1 F | HEIGHT: 67 IN | BODY MASS INDEX: 40.81 KG/M2 | RESPIRATION RATE: 16 BRPM

## 2019-04-09 DIAGNOSIS — S39.012A BACK STRAIN, INITIAL ENCOUNTER: ICD-10-CM

## 2019-04-09 DIAGNOSIS — R10.9 RIGHT FLANK PAIN: Primary | ICD-10-CM

## 2019-04-09 LAB
A/G RATIO: 1.3 (ref 1.1–2.2)
ALBUMIN SERPL-MCNC: 3.8 G/DL (ref 3.4–5)
ALP BLD-CCNC: 65 U/L (ref 40–129)
ALT SERPL-CCNC: 17 U/L (ref 10–40)
ANION GAP SERPL CALCULATED.3IONS-SCNC: 12 MMOL/L (ref 3–16)
AST SERPL-CCNC: 14 U/L (ref 15–37)
BACTERIA: ABNORMAL /HPF
BASOPHILS ABSOLUTE: 0 K/UL (ref 0–0.2)
BASOPHILS RELATIVE PERCENT: 0.3 %
BILIRUB SERPL-MCNC: 0.6 MG/DL (ref 0–1)
BILIRUBIN URINE: NEGATIVE
BLOOD, URINE: ABNORMAL
BUN BLDV-MCNC: 6 MG/DL (ref 7–20)
CALCIUM SERPL-MCNC: 9.1 MG/DL (ref 8.3–10.6)
CHLORIDE BLD-SCNC: 105 MMOL/L (ref 99–110)
CLARITY: ABNORMAL
CO2: 24 MMOL/L (ref 21–32)
COLOR: YELLOW
CREAT SERPL-MCNC: 0.6 MG/DL (ref 0.6–1.1)
EOSINOPHILS ABSOLUTE: 0.3 K/UL (ref 0–0.6)
EOSINOPHILS RELATIVE PERCENT: 3.6 %
EPITHELIAL CELLS, UA: ABNORMAL /HPF
GFR AFRICAN AMERICAN: >60
GFR NON-AFRICAN AMERICAN: >60
GLOBULIN: 2.9 G/DL
GLUCOSE BLD-MCNC: 107 MG/DL (ref 70–99)
GLUCOSE URINE: NEGATIVE MG/DL
HCG(URINE) PREGNANCY TEST: NEGATIVE
HCT VFR BLD CALC: 38.8 % (ref 36–48)
HEMOGLOBIN: 13.3 G/DL (ref 12–16)
KETONES, URINE: NEGATIVE MG/DL
LEUKOCYTE ESTERASE, URINE: NEGATIVE
LIPASE: 37 U/L (ref 13–60)
LYMPHOCYTES ABSOLUTE: 1.9 K/UL (ref 1–5.1)
LYMPHOCYTES RELATIVE PERCENT: 26.9 %
MCH RBC QN AUTO: 33.7 PG (ref 26–34)
MCHC RBC AUTO-ENTMCNC: 34.1 G/DL (ref 31–36)
MCV RBC AUTO: 98.9 FL (ref 80–100)
MICROSCOPIC EXAMINATION: YES
MONOCYTES ABSOLUTE: 0.5 K/UL (ref 0–1.3)
MONOCYTES RELATIVE PERCENT: 6.8 %
MUCUS: ABNORMAL /LPF
NEUTROPHILS ABSOLUTE: 4.5 K/UL (ref 1.7–7.7)
NEUTROPHILS RELATIVE PERCENT: 62.4 %
NITRITE, URINE: NEGATIVE
PDW BLD-RTO: 13.1 % (ref 12.4–15.4)
PH UA: 6.5 (ref 5–8)
PLATELET # BLD: 257 K/UL (ref 135–450)
PMV BLD AUTO: 8.1 FL (ref 5–10.5)
POTASSIUM SERPL-SCNC: 4.3 MMOL/L (ref 3.5–5.1)
PROTEIN UA: NEGATIVE MG/DL
RBC # BLD: 3.93 M/UL (ref 4–5.2)
RBC UA: ABNORMAL /HPF (ref 0–2)
SODIUM BLD-SCNC: 141 MMOL/L (ref 136–145)
SPECIFIC GRAVITY UA: 1.02 (ref 1–1.03)
TOTAL PROTEIN: 6.7 G/DL (ref 6.4–8.2)
URINE REFLEX TO CULTURE: ABNORMAL
URINE TYPE: ABNORMAL
UROBILINOGEN, URINE: 0.2 E.U./DL
WBC # BLD: 7.1 K/UL (ref 4–11)
WBC UA: ABNORMAL /HPF (ref 0–5)

## 2019-04-09 PROCEDURE — 74176 CT ABD & PELVIS W/O CONTRAST: CPT

## 2019-04-09 PROCEDURE — 96374 THER/PROPH/DIAG INJ IV PUSH: CPT

## 2019-04-09 PROCEDURE — 96375 TX/PRO/DX INJ NEW DRUG ADDON: CPT

## 2019-04-09 PROCEDURE — 81001 URINALYSIS AUTO W/SCOPE: CPT

## 2019-04-09 PROCEDURE — 84703 CHORIONIC GONADOTROPIN ASSAY: CPT

## 2019-04-09 PROCEDURE — 85025 COMPLETE CBC W/AUTO DIFF WBC: CPT

## 2019-04-09 PROCEDURE — 80053 COMPREHEN METABOLIC PANEL: CPT

## 2019-04-09 PROCEDURE — 83690 ASSAY OF LIPASE: CPT

## 2019-04-09 PROCEDURE — 2580000003 HC RX 258: Performed by: NURSE PRACTITIONER

## 2019-04-09 PROCEDURE — 99284 EMERGENCY DEPT VISIT MOD MDM: CPT

## 2019-04-09 PROCEDURE — 96361 HYDRATE IV INFUSION ADD-ON: CPT

## 2019-04-09 PROCEDURE — 6360000002 HC RX W HCPCS: Performed by: NURSE PRACTITIONER

## 2019-04-09 RX ORDER — KETOROLAC TROMETHAMINE 30 MG/ML
30 INJECTION, SOLUTION INTRAMUSCULAR; INTRAVENOUS ONCE
Status: COMPLETED | OUTPATIENT
Start: 2019-04-09 | End: 2019-04-09

## 2019-04-09 RX ORDER — CYCLOBENZAPRINE HCL 10 MG
10 TABLET ORAL 3 TIMES DAILY PRN
Qty: 15 TABLET | Refills: 0 | Status: SHIPPED | OUTPATIENT
Start: 2019-04-09 | End: 2019-04-19

## 2019-04-09 RX ORDER — 0.9 % SODIUM CHLORIDE 0.9 %
1000 INTRAVENOUS SOLUTION INTRAVENOUS ONCE
Status: COMPLETED | OUTPATIENT
Start: 2019-04-09 | End: 2019-04-09

## 2019-04-09 RX ORDER — ONDANSETRON 2 MG/ML
4 INJECTION INTRAMUSCULAR; INTRAVENOUS ONCE
Status: COMPLETED | OUTPATIENT
Start: 2019-04-09 | End: 2019-04-09

## 2019-04-09 RX ORDER — ONDANSETRON 4 MG/1
4 TABLET, ORALLY DISINTEGRATING ORAL EVERY 8 HOURS PRN
Qty: 20 TABLET | Refills: 0 | Status: SHIPPED | OUTPATIENT
Start: 2019-04-09 | End: 2019-07-25 | Stop reason: ALTCHOICE

## 2019-04-09 RX ADMIN — ONDANSETRON 4 MG: 2 INJECTION, SOLUTION INTRAMUSCULAR; INTRAVENOUS at 10:43

## 2019-04-09 RX ADMIN — KETOROLAC TROMETHAMINE 30 MG: 30 INJECTION, SOLUTION INTRAMUSCULAR at 10:43

## 2019-04-09 RX ADMIN — SODIUM CHLORIDE 1000 ML: 9 INJECTION, SOLUTION INTRAVENOUS at 11:01

## 2019-04-09 ASSESSMENT — ENCOUNTER SYMPTOMS
DIARRHEA: 0
VOMITING: 1
COUGH: 0
NAUSEA: 1
SHORTNESS OF BREATH: 0
BACK PAIN: 1
ABDOMINAL PAIN: 1
BLOOD IN STOOL: 0

## 2019-04-09 ASSESSMENT — PAIN SCALES - GENERAL: PAINLEVEL_OUTOF10: 9

## 2019-04-09 NOTE — LETTER
[place] [place] [facility]  [address] [address]  Phone: [phone]               April 11, 2019    Patient: Pebbles Haque   YOB: 1985   Date of Visit: 4/9/2019       To Whom It May Concern:    Hyun Candelario was seen and treated in our emergency department on 4/9/2019.        Sincerely,       Awais Bright RN         Signature:__________________________________

## 2019-04-09 NOTE — ED PROVIDER NOTES
[address] ED  eMERGENCY dEPARTMENT eNCOUnter        Pt Name: Maddy Maxwell  MRN: [id number]  [place] 1985  Date of evaluation: 4/9/2019  Provider: Stanley Alex, APRN - CNP  PCP: Jordyn Eller MD  ED Attending: No att. providers found    [address] [place] [address]       Chief Complaint   Patient presents with    Flank Pain     right side back and flank pain. hx of kidney stones. feels like stone. HISTORY OF PRESENT ILLNESS   (Location/Symptom, Timing/Onset, Context/Setting, Quality, Duration, Modifying Factors, Severity)  Note limiting factors. Maddy Maxwell is a 29 y.o. female  who presents the emergency department complaints of right flank pain for the last several days. Patient does have a history of kidney stones. She states her last stone was about 3 years ago. She does report a decreased appetite. She's been taking ibuprofen and using a heating pad without much relief. She states it feels very similar to when she had an 11 mm stone. She does report one episode of vomiting today upon arrival to the ED she is quite nauseated. No diarrhea. No hematuria or dysuria. No fevers or chills. Nursing Notes were all reviewed and agreed with or any disagreements were addressed  in the HPI. REVIEW OF SYSTEMS    (2-9 systems for level 4, 10 or more for level 5)     Review of Systems   Constitutional: Negative for chills and fever. HENT: Negative. Respiratory: Negative for cough and shortness of breath. Cardiovascular: Negative for chest pain. Gastrointestinal: Positive for abdominal pain, nausea and vomiting. Negative for blood in stool and diarrhea. Genitourinary: Positive for flank pain. Negative for decreased urine volume, dysuria, hematuria, menstrual problem, pelvic pain and urgency. Musculoskeletal: Positive for back pain. Skin: Negative. Neurological: Negative for dizziness, weakness and headaches. Psychiatric/Behavioral: Negative. clubs or organizations: None     Relationship status: None    Intimate partner violence:     Fear of current or ex partner: None     Emotionally abused: None     Physically abused: None     Forced sexual activity: None   Other Topics Concern    None   Social History Narrative    None       SCREENINGS             PHYSICAL EXAM    (up to 7 for level 4, 8 or more for level 5)     ED Triage Vitals [04/09/19 1016]   BP Temp Temp Source Pulse Resp SpO2 Height Weight   111/71 -- Oral 79 16 98 % 5' 7\" (1.702 m) 260 lb (117.9 kg)       Physical Exam   Constitutional: She appears well-developed and well-nourished. HENT:   Head: Normocephalic and atraumatic. Right Ear: External ear normal.   Left Ear: External ear normal.   Nose: Nose normal.   Eyes: Conjunctivae are normal.   Neck: Normal range of motion. Cardiovascular: Normal rate and regular rhythm. Pulmonary/Chest: Effort normal and breath sounds normal.   Abdominal: Soft. Normal appearance and bowel sounds are normal. She exhibits no distension. There is CVA tenderness (right). Musculoskeletal: Normal range of motion. Neurological: She is alert. Skin: Skin is warm and dry. Psychiatric: She has a normal mood and affect. Her behavior is normal.   Nursing note and vitals reviewed.       DIAGNOSTIC RESULTS   LABS:    Labs Reviewed   URINE RT REFLEX TO CULTURE - Abnormal; Notable for the following components:       Result Value    Clarity, UA SL CLOUDY (*)     Blood, Urine TRACE-INTACT (*)     All other components within normal limits    Narrative:     Performed at:  [facility] 75,  [place], [facility]   Phone [phone]   CBC WITH AUTO DIFFERENTIAL - Abnormal; Notable for the following components:    RBC 3.93 (*)     All other components within normal limits    Narrative:     Performed at:  [facility] 75,  [place], [facility]   Phone [phone] COMPREHENSIVE METABOLIC PANEL - Abnormal; Notable for the following components:    Glucose 107 (*)     BUN 6 (*)     AST 14 (*)     All other components within normal limits    Narrative:     Performed at:  [facility] 75,  [place], [place]   Phone [phone]   MICROSCOPIC URINALYSIS - Abnormal; Notable for the following components:    Mucus, UA 1+ (*)     RBC, UA 3-5 (*)     Bacteria, UA 1+ (*)     All other components within normal limits    Narrative:     Performed at:  [facility] 75,  [place], [place]   Phone [phone]   PREGNANCY, URINE    Narrative:     Performed at:  [place] [address],  [place], [place]   Phone [phone]   LIPASE    Narrative:     Performed at:  [facility]) [facility] 75,  [place], [place]   Phone [phone]       All other labs were within normal range or not returned as of this dictation. EKG: All EKG's are interpreted by the Emergency Department Physician who either signs orCo-signs this chart in the absence of a cardiologist.  Please see their note for interpretation of EKG. RADIOLOGY:   Non-plain film images such as CT, Ultrasound and MRI are read by the radiologist. Plain radiographic images are visualized andpreliminarily interpreted by the  ED Provider with the below findings:    Interpretation per theRadiologist below, if available at the time of this note:    CT ABDOMEN PELVIS WO CONTRAST   Final Result   1. No acute abnormality. No results found.       PROCEDURES   Unless otherwise noted below, none     Procedures    CRITICAL CARE TIME   N/A    CONSULTS:  None      EMERGENCY DEPARTMENT COURSE and DIFFERENTIALDIAGNOSIS/MDM:   Vitals:    Vitals:    04/09/19 1016 04/09/19 1213 04/09/19 1218   BP: 111/71 113/76    Pulse: 79     Resp: 16     Temp:   98.1 °F (36.7 °C)   TempSrc: Oral  Oral   SpO2: 98%     Weight: 260 lb (117.9 kg)     Height: 5' 7\" (1.702 m)         Patient was given thefollowing medications:  Medications   ketorolac (TORADOL) injection 30 mg (30 mg Intravenous Given 4/9/19 1043)   ondansetron (ZOFRAN) injection 4 mg (4 mg Intravenous Given 4/9/19 1043)   0.9 % sodium chloride bolus (1,000 mLs Intravenous New Bag 4/9/19 1101)       Patient presented to the emergency department complaints of right flank pain for the last 3 days. Physical exam did reveal CVA tenderness. I did workup for kidney stone that she does have a history of stones and this felt very similar. CT scan showed no acute abnormality. Blood work was unremarkable. I question if this is flank pain versus musculoskeletal pain. She'll be started on diclofenac and Flexeril. She was also given Zofran for nausea. She is to follow-up with primary care in the next 3 days. She is to return to the emergency department for any worsening symptoms. I discussed treatment plan with patient, patient is agreeable and denies questions at this time. The patient tolerated their visit well. They were seen and evaluated by the attending physician, No att. providers found who agreed with the assessment and plan. The patientand / or the family were informed of the results of any tests, a time was given to answer questions, a plan was proposed and they agreed with plan. FINAL IMPRESSION      1. Right flank pain    2.  Back strain, initial encounter          DISPOSITION/PLAN   DISPOSITION Decision To Discharge 04/09/2019 12:14:22 PM      PATIENT REFERRED TO:  Brett Aquino, [address]  [phone]    Schedule an appointment as soon as possible for a visit in 3 days  For follow up care    [facility] [place] [facility]  [address] [facility]  Go to   As needed, If symptoms worsen      DISCHARGE MEDICATIONS:  New Prescriptions

## 2019-04-09 NOTE — ED NOTES
Saline lock removed intact. IV site looked unremarkable. Pressure dressing applied. Discharge instructions reviewed with Ms. Lorraine Chauhan. She verbalized understanding. Copy of discharge instructions and prescriptions given. Ms. Lorraine Chauhan was discharged to home in good condition per personal vehicle, friend/family driving. She exited the ED without difficulty.         Juany Lepe RN  04/09/19 3854

## 2019-04-11 NOTE — ED PROVIDER NOTES
comfortable going home. Orders Placed This Encounter   Procedures    CT ABDOMEN PELVIS WO CONTRAST    Urine, reflex to culture    Pregnancy, Urine    CBC auto differential    Comprehensive metabolic panel    Lipase    Microscopic Urinalysis    Need temp documented please    Insert peripheral IV     Orders Placed This Encounter   Medications    ketorolac (TORADOL) injection 30 mg    ondansetron (ZOFRAN) injection 4 mg    0.9 % sodium chloride bolus    ondansetron (ZOFRAN ODT) 4 MG disintegrating tablet     Sig: Take 1 tablet by mouth every 8 hours as needed for Nausea     Dispense:  20 tablet     Refill:  0    diclofenac (VOLTAREN) 50 MG EC tablet     Sig: Take 1 tablet by mouth 2 times daily     Dispense:  30 tablet     Refill:  0    cyclobenzaprine (FLEXERIL) 10 MG tablet     Sig: Take 1 tablet by mouth 3 times daily as needed for Muscle spasms     Dispense:  15 tablet     Refill:  0        Clinical Impression:  1. Right flank pain    2. Back strain, initial encounter      Disposition:  Patient discharged home in stable condition. This chart was generated in part by using Dragon Dictation system and may contain errors related to that system including errors in grammar, punctuation, and spelling, as well as words and phrases that may be inappropriate. If there are any questions or concerns please feel free to contact the dictating provider for clarification.            Rafi Peres,   04/11/19 3074

## 2019-05-18 DIAGNOSIS — F41.9 ANXIETY AND DEPRESSION: ICD-10-CM

## 2019-05-18 DIAGNOSIS — F32.A ANXIETY AND DEPRESSION: ICD-10-CM

## 2019-05-20 RX ORDER — FLUOXETINE HYDROCHLORIDE 40 MG/1
CAPSULE ORAL
Qty: 30 CAPSULE | Refills: 1 | Status: SHIPPED | OUTPATIENT
Start: 2019-05-20 | End: 2019-07-25 | Stop reason: SDUPTHER

## 2019-07-25 ENCOUNTER — HOSPITAL ENCOUNTER (EMERGENCY)
Age: 34
Discharge: HOME | End: 2019-07-25
Payer: COMMERCIAL

## 2019-07-25 ENCOUNTER — APPOINTMENT (OUTPATIENT)
Dept: GENERAL RADIOLOGY | Age: 34
End: 2019-07-25
Payer: COMMERCIAL

## 2019-07-25 VITALS
SYSTOLIC BLOOD PRESSURE: 119 MMHG | RESPIRATION RATE: 16 BRPM | WEIGHT: 278 LBS | HEART RATE: 75 BPM | OXYGEN SATURATION: 100 % | BODY MASS INDEX: 43.54 KG/M2 | DIASTOLIC BLOOD PRESSURE: 75 MMHG | TEMPERATURE: 98.4 F

## 2019-07-25 DIAGNOSIS — F41.9 ANXIETY AND DEPRESSION: ICD-10-CM

## 2019-07-25 DIAGNOSIS — F32.A ANXIETY AND DEPRESSION: ICD-10-CM

## 2019-07-25 DIAGNOSIS — R60.0 LEG EDEMA: Primary | ICD-10-CM

## 2019-07-25 DIAGNOSIS — M79.89 LEG SWELLING: ICD-10-CM

## 2019-07-25 LAB
A/G RATIO: 1.2 (ref 1.1–2.2)
ALBUMIN SERPL-MCNC: 3.7 G/DL (ref 3.4–5)
ALP BLD-CCNC: 68 U/L (ref 40–129)
ALT SERPL-CCNC: 12 U/L (ref 10–40)
ANION GAP SERPL CALCULATED.3IONS-SCNC: 11 MMOL/L (ref 3–16)
AST SERPL-CCNC: 12 U/L (ref 15–37)
BASOPHILS ABSOLUTE: 0 K/UL (ref 0–0.2)
BASOPHILS RELATIVE PERCENT: 0.5 %
BILIRUB SERPL-MCNC: 0.5 MG/DL (ref 0–1)
BUN BLDV-MCNC: 8 MG/DL (ref 7–20)
CALCIUM SERPL-MCNC: 9.1 MG/DL (ref 8.3–10.6)
CHLORIDE BLD-SCNC: 101 MMOL/L (ref 99–110)
CO2: 25 MMOL/L (ref 21–32)
CREAT SERPL-MCNC: 0.7 MG/DL (ref 0.6–1.1)
EOSINOPHILS ABSOLUTE: 0.2 K/UL (ref 0–0.6)
EOSINOPHILS RELATIVE PERCENT: 2.8 %
GFR AFRICAN AMERICAN: >60
GFR NON-AFRICAN AMERICAN: >60
GLOBULIN: 3 G/DL
GLUCOSE BLD-MCNC: 117 MG/DL (ref 70–99)
HCG QUALITATIVE: NEGATIVE
HCT VFR BLD CALC: 39.8 % (ref 36–48)
HEMOGLOBIN: 13.6 G/DL (ref 12–16)
LYMPHOCYTES ABSOLUTE: 1.6 K/UL (ref 1–5.1)
LYMPHOCYTES RELATIVE PERCENT: 21.7 %
MCH RBC QN AUTO: 32.7 PG (ref 26–34)
MCHC RBC AUTO-ENTMCNC: 34.2 G/DL (ref 31–36)
MCV RBC AUTO: 95.6 FL (ref 80–100)
MONOCYTES ABSOLUTE: 0.4 K/UL (ref 0–1.3)
MONOCYTES RELATIVE PERCENT: 5.8 %
NEUTROPHILS ABSOLUTE: 5.2 K/UL (ref 1.7–7.7)
NEUTROPHILS RELATIVE PERCENT: 69.2 %
PDW BLD-RTO: 14 % (ref 12.4–15.4)
PLATELET # BLD: 250 K/UL (ref 135–450)
PMV BLD AUTO: 8.1 FL (ref 5–10.5)
POTASSIUM REFLEX MAGNESIUM: 3.7 MMOL/L (ref 3.5–5.1)
PRO-BNP: 35 PG/ML (ref 0–124)
RBC # BLD: 4.16 M/UL (ref 4–5.2)
SODIUM BLD-SCNC: 137 MMOL/L (ref 136–145)
TOTAL PROTEIN: 6.7 G/DL (ref 6.4–8.2)
WBC # BLD: 7.5 K/UL (ref 4–11)

## 2019-07-25 PROCEDURE — 2580000003 HC RX 258: Performed by: PHYSICIAN ASSISTANT

## 2019-07-25 PROCEDURE — 93971 EXTREMITY STUDY: CPT

## 2019-07-25 PROCEDURE — 96361 HYDRATE IV INFUSION ADD-ON: CPT

## 2019-07-25 PROCEDURE — 80053 COMPREHEN METABOLIC PANEL: CPT

## 2019-07-25 PROCEDURE — 96360 HYDRATION IV INFUSION INIT: CPT

## 2019-07-25 PROCEDURE — 83880 ASSAY OF NATRIURETIC PEPTIDE: CPT

## 2019-07-25 PROCEDURE — 84703 CHORIONIC GONADOTROPIN ASSAY: CPT

## 2019-07-25 PROCEDURE — 99284 EMERGENCY DEPT VISIT MOD MDM: CPT

## 2019-07-25 PROCEDURE — 85025 COMPLETE CBC W/AUTO DIFF WBC: CPT

## 2019-07-25 PROCEDURE — 71046 X-RAY EXAM CHEST 2 VIEWS: CPT

## 2019-07-25 RX ORDER — FLUOXETINE HYDROCHLORIDE 40 MG/1
CAPSULE ORAL
Qty: 30 CAPSULE | Refills: 0 | Status: SHIPPED | OUTPATIENT
Start: 2019-07-25 | End: 2019-08-29 | Stop reason: SDUPTHER

## 2019-07-25 RX ORDER — NORGESTIMATE AND ETHINYL ESTRADIOL 7DAYSX3 28
KIT ORAL
COMMUNITY
End: 2020-02-26

## 2019-07-25 RX ORDER — 0.9 % SODIUM CHLORIDE 0.9 %
1000 INTRAVENOUS SOLUTION INTRAVENOUS ONCE
Status: COMPLETED | OUTPATIENT
Start: 2019-07-25 | End: 2019-07-25

## 2019-07-25 RX ADMIN — SODIUM CHLORIDE 1000 ML: 9 INJECTION, SOLUTION INTRAVENOUS at 14:25

## 2019-07-25 ASSESSMENT — ENCOUNTER SYMPTOMS
VOMITING: 0
COUGH: 0
NAUSEA: 0
SHORTNESS OF BREATH: 0

## 2019-07-25 NOTE — LETTER
[facility] [place] [facility]  [address] [facility] [address]  Phone: [phone]               July 25, 2019    Patient: Yo Marshall   YOB: 1985   Date of Visit: 7/25/2019       To Whom It May Concern:    Ashley Meadows was seen and treated in our emergency department on 7/25/2019. She may return to work on 7/26/2019.       Sincerely,       Lashell Taylor RN         Signature:__________________________________

## 2019-07-25 NOTE — ED NOTES
Pt resting in bed at this time, breathing easy. Call light in reach. Will continue to monitor.       Mardene Osgood, RN  07/25/19 3912

## 2019-07-25 NOTE — ED PROVIDER NOTES
agrees with assessment and plan. Patients PO2 is 99% on room air they are not hypoxic, patient reports gradual onset of swelling of bilateral lower extremities worse on left than on right. Patient has psoriasis and has been swimming in [place] at a beach she is concerned she may been exposed to a type of flesh eating bacteria. Patient has no evidence of cellulitis. Her psoriasis is under good control. She does have small plaques on her knees bilaterally and on her elbows bilaterally no other areas of skin involvement. Patient does have +2 pedal edema on the left +1 on the right. Labs are evaluated to rule out any electrolyte abnormality any leukocytosis. DVT study is ordered on left lower extremity to make sure there was no development of a blood clot as she indicates they did have a 10 [address]. to the beach and home 7 days prior to arrival.  We discussed salt control and elevating her feet and wearing compression stockings to help with swelling. We advised her follow-up with primary care provider. Labs evaluated reveal no anemia no leukocytosis. No electrolyte abnormality normal kidney and liver function. No elevation in BNP. All questions are answered. Indications for return to the ED are discussed. Patient is advised if any new or worsening symptoms arise they should immediately return to the emergency room. Follow-up with primary care in 1-2 days. The patient tolerated their visit well. I have evaluated this patient. My supervising physician was available for consultation. The patient and / or the family were informed of the results of any tests, a time was given to answer questions, a plan was proposed and they agreed Eduarda Masters.     Results for orders placed or performed during the hospital encounter of 07/25/19   CBC Auto Differential   Result Value Ref Range    WBC 7.5 4.0 - 11.0 K/uL    RBC 4.16 4.00 - 5.20 M/uL    Hemoglobin 13.6 12.0 - 16.0 g/dL    Hematocrit 39.8 36.0 - 48.0 %    MCV 95.6 80.0 - 100.0 fL    MCH 32.7 26.0 - 34.0 pg    MCHC 34.2 31.0 - 36.0 g/dL    RDW 14.0 12.4 - 15.4 %    Platelets 289 454 - 104 K/uL    MPV 8.1 5.0 - 10.5 fL    Neutrophils % 69.2 %    Lymphocytes % 21.7 %    Monocytes % 5.8 %    Eosinophils % 2.8 %    Basophils % 0.5 %    Neutrophils # 5.2 1.7 - 7.7 K/uL    Lymphocytes # 1.6 1.0 - 5.1 K/uL    Monocytes # 0.4 0.0 - 1.3 K/uL    Eosinophils # 0.2 0.0 - 0.6 K/uL    Basophils # 0.0 0.0 - 0.2 K/uL   Comprehensive Metabolic Panel w/ Reflex to MG   Result Value Ref Range    Sodium 137 136 - 145 mmol/L    Potassium reflex Magnesium 3.7 3.5 - 5.1 mmol/L    Chloride 101 99 - 110 mmol/L    CO2 25 21 - 32 mmol/L    Anion Gap 11 3 - 16    Glucose 117 (H) 70 - 99 mg/dL    BUN 8 7 - 20 mg/dL    CREATININE 0.7 0.6 - 1.1 mg/dL    GFR Non-African American >60 >60    GFR African American >60 >60    Calcium 9.1 8.3 - 10.6 mg/dL    Total Protein 6.7 6.4 - 8.2 g/dL    Alb 3.7 3.4 - 5.0 g/dL    Albumin/Globulin Ratio 1.2 1.1 - 2.2    Total Bilirubin 0.5 0.0 - 1.0 mg/dL    Alkaline Phosphatase 68 40 - 129 U/L    ALT 12 10 - 40 U/L    AST 12 (L) 15 - 37 U/L    Globulin 3.0 g/dL   Brain Natriuretic Peptide   Result Value Ref Range    Pro-BNP 35 0 - 124 pg/mL   HCG Qualitative, Serum   Result Value Ref Range    hCG Qual Negative Detects HCG level >10 MIU/mL       I estimate there is LOW risk for COMPARTMENT SYNDROME, DEEP VENOUS THROMBOSIS, SEPTIC ARTHRITIS, TENDON OR NEUROVASCULAR INJURY, thus I consider the discharge disposition reasonable. Janay Fong and I have discussed the diagnosis and risks, and we agree with discharging home to follow-up with their primary doctor or the referral orthopedist. We also discussed returning to the Emergency Department immediately if new or worsening symptoms occur. We have discussed the symptoms which are most concerning (e.g., changing or worsening pain, numbness, weakness) that necessitate immediate return. FINAL IMPRESSION      1. Leg edema    2.

## 2019-07-25 NOTE — ED NOTES
Hourly rounding completed. Patient verbalized no wants or needs at this time. Will continue to monitor.        Ana María Calero, RN  07/25/19 7807

## 2019-08-23 ENCOUNTER — OFFICE VISIT (OUTPATIENT)
Dept: FAMILY MEDICINE CLINIC | Age: 34
End: 2019-08-23
Payer: COMMERCIAL

## 2019-08-23 VITALS
BODY MASS INDEX: 43.92 KG/M2 | TEMPERATURE: 97.9 F | OXYGEN SATURATION: 97 % | HEART RATE: 70 BPM | WEIGHT: 279.8 LBS | HEIGHT: 67 IN | SYSTOLIC BLOOD PRESSURE: 122 MMHG | DIASTOLIC BLOOD PRESSURE: 80 MMHG

## 2019-08-23 DIAGNOSIS — F41.9 ANXIETY AND DEPRESSION: Primary | ICD-10-CM

## 2019-08-23 DIAGNOSIS — Z23 NEED FOR PNEUMOCOCCAL VACCINATION: ICD-10-CM

## 2019-08-23 DIAGNOSIS — F32.A ANXIETY AND DEPRESSION: Primary | ICD-10-CM

## 2019-08-23 DIAGNOSIS — R60.0 EDEMA OF LEFT LOWER EXTREMITY: ICD-10-CM

## 2019-08-23 DIAGNOSIS — R73.9 HYPERGLYCEMIA: ICD-10-CM

## 2019-08-23 PROCEDURE — 90471 IMMUNIZATION ADMIN: CPT | Performed by: FAMILY MEDICINE

## 2019-08-23 PROCEDURE — 36415 COLL VENOUS BLD VENIPUNCTURE: CPT | Performed by: FAMILY MEDICINE

## 2019-08-23 PROCEDURE — 90732 PPSV23 VACC 2 YRS+ SUBQ/IM: CPT | Performed by: FAMILY MEDICINE

## 2019-08-23 PROCEDURE — 99214 OFFICE O/P EST MOD 30 MIN: CPT | Performed by: FAMILY MEDICINE

## 2019-08-23 ASSESSMENT — ENCOUNTER SYMPTOMS: SHORTNESS OF BREATH: 0

## 2019-08-23 NOTE — PROGRESS NOTES
Emotionally abused: Not on file     Physically abused: Not on file     Forced sexual activity: Not on file   Other Topics Concern    Not on file   Social History Narrative    Not on file     Family History   Problem Relation Age of Onset    Stroke Father 62    Diabetes Sister 23        type 1     Diabetes Paternal Grandmother                               Review Of Systems    Review of Systems   Constitutional: Negative for chills and fever. Respiratory: Negative for shortness of breath. Cardiovascular: Positive for leg swelling. Negative for chest pain. PHYSICAL EXAMINATION:    /80   Pulse 70   Temp 97.9 °F (36.6 °C) (Oral)   Ht 5' 7\" (1.702 m)   Wt 279 lb 12.8 oz (126.9 kg)   SpO2 97%   BMI 43.82 kg/m²      Physical Exam   Constitutional: She is oriented to person, place, and time. She appears well-developed and well-nourished. HENT:   Head: Normocephalic and atraumatic. Right Ear: External ear normal.   Left Ear: External ear normal.   Eyes: Conjunctivae and EOM are normal.   Cardiovascular: Normal rate, regular rhythm and normal heart sounds. Pulmonary/Chest: Effort normal. No stridor. No respiratory distress. She has no wheezes. Musculoskeletal: She exhibits edema. Left greater than right   Neurological: She is alert and oriented to person, place, and time. Skin: Skin is warm and dry. She is not diaphoretic. Vitals reviewed. ASSESSMENT:   Well Adult, See encounter diagnoses  Corie Felipe was seen today for follow-up. Diagnoses and all orders for this visit:    Anxiety and depression  Stable continue current regimen. Hyperglycemia  -     Hemoglobin A1C    Edema of left lower extremity  Wear compression stockings as previously recommended limit salt and elevate legs. Notify me if symptoms worsen or fail to improve.     Need for pneumococcal vaccination  -     Pneumococcal polysaccharide vaccine 23-valent greater than or equal to 1yo subcutaneous/IM          Plan: See orders and medications filed with this encounter. Return in about 6 months (around 2/23/2020), or if symptoms worsen or fail to improve, for or sooner if needed.

## 2019-08-24 LAB
ESTIMATED AVERAGE GLUCOSE: 102.5 MG/DL
HBA1C MFR BLD: 5.2 %

## 2019-08-29 DIAGNOSIS — F41.9 ANXIETY AND DEPRESSION: ICD-10-CM

## 2019-08-29 DIAGNOSIS — F32.A ANXIETY AND DEPRESSION: ICD-10-CM

## 2019-08-29 RX ORDER — FLUOXETINE HYDROCHLORIDE 40 MG/1
CAPSULE ORAL
Qty: 30 CAPSULE | Refills: 0 | Status: SHIPPED | OUTPATIENT
Start: 2019-08-29 | End: 2019-10-11 | Stop reason: SDUPTHER

## 2020-02-26 ENCOUNTER — HOSPITAL ENCOUNTER (EMERGENCY)
Age: 35
Discharge: HOME | End: 2020-02-26
Payer: COMMERCIAL

## 2020-02-26 ENCOUNTER — APPOINTMENT (OUTPATIENT)
Dept: CT IMAGING | Age: 35
End: 2020-02-26
Payer: COMMERCIAL

## 2020-02-26 VITALS
SYSTOLIC BLOOD PRESSURE: 110 MMHG | RESPIRATION RATE: 18 BRPM | OXYGEN SATURATION: 99 % | DIASTOLIC BLOOD PRESSURE: 78 MMHG | TEMPERATURE: 98 F | BODY MASS INDEX: 45.42 KG/M2 | WEIGHT: 290 LBS | HEART RATE: 69 BPM

## 2020-02-26 LAB
A/G RATIO: 1.3 (ref 1.1–2.2)
ALBUMIN SERPL-MCNC: 4.1 G/DL (ref 3.4–5)
ALP BLD-CCNC: 86 U/L (ref 40–129)
ALT SERPL-CCNC: 16 U/L (ref 10–40)
ANION GAP SERPL CALCULATED.3IONS-SCNC: 12 MMOL/L (ref 3–16)
AST SERPL-CCNC: 13 U/L (ref 15–37)
BACTERIA: ABNORMAL /HPF
BASOPHILS ABSOLUTE: 0 K/UL (ref 0–0.2)
BASOPHILS RELATIVE PERCENT: 0.3 %
BILIRUB SERPL-MCNC: 1 MG/DL (ref 0–1)
BILIRUBIN URINE: NEGATIVE
BLOOD, URINE: ABNORMAL
BUN BLDV-MCNC: 10 MG/DL (ref 7–20)
CALCIUM SERPL-MCNC: 9.2 MG/DL (ref 8.3–10.6)
CHLORIDE BLD-SCNC: 103 MMOL/L (ref 99–110)
CLARITY: ABNORMAL
CO2: 24 MMOL/L (ref 21–32)
COLOR: YELLOW
CREAT SERPL-MCNC: 0.7 MG/DL (ref 0.6–1.1)
EOSINOPHILS ABSOLUTE: 0.2 K/UL (ref 0–0.6)
EOSINOPHILS RELATIVE PERCENT: 2.5 %
EPITHELIAL CELLS, UA: ABNORMAL /HPF (ref 0–5)
GFR AFRICAN AMERICAN: >60
GFR NON-AFRICAN AMERICAN: >60
GLOBULIN: 3.2 G/DL
GLUCOSE BLD-MCNC: 97 MG/DL (ref 70–99)
GLUCOSE URINE: NEGATIVE MG/DL
HCG QUALITATIVE: NEGATIVE
HCT VFR BLD CALC: 44 % (ref 36–48)
HEMOGLOBIN: 15.1 G/DL (ref 12–16)
KETONES, URINE: NEGATIVE MG/DL
LEUKOCYTE ESTERASE, URINE: ABNORMAL
LYMPHOCYTES ABSOLUTE: 1.7 K/UL (ref 1–5.1)
LYMPHOCYTES RELATIVE PERCENT: 21.6 %
MCH RBC QN AUTO: 32.4 PG (ref 26–34)
MCHC RBC AUTO-ENTMCNC: 34.4 G/DL (ref 31–36)
MCV RBC AUTO: 94.1 FL (ref 80–100)
MICROSCOPIC EXAMINATION: YES
MONOCYTES ABSOLUTE: 0.5 K/UL (ref 0–1.3)
MONOCYTES RELATIVE PERCENT: 6.3 %
NEUTROPHILS ABSOLUTE: 5.5 K/UL (ref 1.7–7.7)
NEUTROPHILS RELATIVE PERCENT: 69.3 %
NITRITE, URINE: POSITIVE
PDW BLD-RTO: 13.7 % (ref 12.4–15.4)
PH UA: 7 (ref 5–8)
PLATELET # BLD: 272 K/UL (ref 135–450)
PMV BLD AUTO: 8.8 FL (ref 5–10.5)
POTASSIUM SERPL-SCNC: 3.9 MMOL/L (ref 3.5–5.1)
PROTEIN UA: NEGATIVE MG/DL
RBC # BLD: 4.67 M/UL (ref 4–5.2)
RBC UA: ABNORMAL /HPF (ref 0–4)
SODIUM BLD-SCNC: 139 MMOL/L (ref 136–145)
SPECIFIC GRAVITY UA: 1.02 (ref 1–1.03)
TOTAL PROTEIN: 7.3 G/DL (ref 6.4–8.2)
URINE TYPE: ABNORMAL
UROBILINOGEN, URINE: 0.2 E.U./DL
WBC # BLD: 7.9 K/UL (ref 4–11)
WBC UA: ABNORMAL /HPF (ref 0–5)

## 2020-02-26 PROCEDURE — 87086 URINE CULTURE/COLONY COUNT: CPT

## 2020-02-26 PROCEDURE — 80053 COMPREHEN METABOLIC PANEL: CPT

## 2020-02-26 PROCEDURE — 85025 COMPLETE CBC W/AUTO DIFF WBC: CPT

## 2020-02-26 PROCEDURE — 74177 CT ABD & PELVIS W/CONTRAST: CPT

## 2020-02-26 PROCEDURE — 96365 THER/PROPH/DIAG IV INF INIT: CPT

## 2020-02-26 PROCEDURE — 81001 URINALYSIS AUTO W/SCOPE: CPT

## 2020-02-26 PROCEDURE — 99284 EMERGENCY DEPT VISIT MOD MDM: CPT

## 2020-02-26 PROCEDURE — 96375 TX/PRO/DX INJ NEW DRUG ADDON: CPT

## 2020-02-26 PROCEDURE — 87077 CULTURE AEROBIC IDENTIFY: CPT

## 2020-02-26 PROCEDURE — 6360000002 HC RX W HCPCS: Performed by: NURSE PRACTITIONER

## 2020-02-26 PROCEDURE — 6360000004 HC RX CONTRAST MEDICATION: Performed by: NURSE PRACTITIONER

## 2020-02-26 PROCEDURE — 87186 SC STD MICRODIL/AGAR DIL: CPT

## 2020-02-26 PROCEDURE — 84703 CHORIONIC GONADOTROPIN ASSAY: CPT

## 2020-02-26 PROCEDURE — 2580000003 HC RX 258: Performed by: NURSE PRACTITIONER

## 2020-02-26 RX ORDER — KETOROLAC TROMETHAMINE 30 MG/ML
30 INJECTION, SOLUTION INTRAMUSCULAR; INTRAVENOUS ONCE
Status: COMPLETED | OUTPATIENT
Start: 2020-02-26 | End: 2020-02-26

## 2020-02-26 RX ORDER — 0.9 % SODIUM CHLORIDE 0.9 %
1000 INTRAVENOUS SOLUTION INTRAVENOUS ONCE
Status: COMPLETED | OUTPATIENT
Start: 2020-02-26 | End: 2020-02-26

## 2020-02-26 RX ORDER — ONDANSETRON 2 MG/ML
4 INJECTION INTRAMUSCULAR; INTRAVENOUS ONCE
Status: COMPLETED | OUTPATIENT
Start: 2020-02-26 | End: 2020-02-26

## 2020-02-26 RX ORDER — MORPHINE SULFATE 4 MG/ML
4 INJECTION, SOLUTION INTRAMUSCULAR; INTRAVENOUS ONCE
Status: COMPLETED | OUTPATIENT
Start: 2020-02-26 | End: 2020-02-26

## 2020-02-26 RX ORDER — PHENAZOPYRIDINE HYDROCHLORIDE 100 MG/1
100 TABLET, FILM COATED ORAL 3 TIMES DAILY PRN
Qty: 9 TABLET | Refills: 0 | Status: SHIPPED | OUTPATIENT
Start: 2020-02-26 | End: 2020-02-29

## 2020-02-26 RX ORDER — NITROFURANTOIN 25; 75 MG/1; MG/1
100 CAPSULE ORAL 2 TIMES DAILY
Qty: 14 CAPSULE | Refills: 0 | Status: SHIPPED | OUTPATIENT
Start: 2020-02-26 | End: 2020-03-04

## 2020-02-26 RX ADMIN — ONDANSETRON 4 MG: 2 INJECTION INTRAMUSCULAR; INTRAVENOUS at 17:58

## 2020-02-26 RX ADMIN — MORPHINE SULFATE 4 MG: 4 INJECTION, SOLUTION INTRAMUSCULAR; INTRAVENOUS at 17:58

## 2020-02-26 RX ADMIN — CEFTRIAXONE SODIUM 1 G: 1 INJECTION, POWDER, FOR SOLUTION INTRAMUSCULAR; INTRAVENOUS at 17:59

## 2020-02-26 RX ADMIN — IOPAMIDOL 75 ML: 755 INJECTION, SOLUTION INTRAVENOUS at 18:14

## 2020-02-26 RX ADMIN — SODIUM CHLORIDE 1000 ML: 9 INJECTION, SOLUTION INTRAVENOUS at 17:59

## 2020-02-26 RX ADMIN — KETOROLAC TROMETHAMINE 30 MG: 30 INJECTION, SOLUTION INTRAMUSCULAR at 17:58

## 2020-02-26 ASSESSMENT — PAIN DESCRIPTION - LOCATION
LOCATION: FLANK
LOCATION: BACK

## 2020-02-26 ASSESSMENT — PAIN DESCRIPTION - PAIN TYPE
TYPE: ACUTE PAIN
TYPE: ACUTE PAIN

## 2020-02-26 ASSESSMENT — PAIN DESCRIPTION - ORIENTATION
ORIENTATION: RIGHT;LOWER
ORIENTATION: RIGHT

## 2020-02-26 ASSESSMENT — PAIN DESCRIPTION - DESCRIPTORS: DESCRIPTORS: TIGHTNESS

## 2020-02-26 ASSESSMENT — PAIN SCALES - GENERAL
PAINLEVEL_OUTOF10: 9
PAINLEVEL_OUTOF10: 9

## 2020-02-26 NOTE — ED PROVIDER NOTES
[facility] Emergency Department    CHIEF COMPLAINT  Hematuria (Pt was at urgent care yesterday and told she has blood in her urine and told to come to the ED for further evaluaiton. Pt has hx of kidney stones. Pt c/o lower back pain. )      HISTORY OF PRESENT ILLNESS  Rich Yoder is a 36 y.o. female who presents to the ED complaining of right lower back pain that radiates around to the abdomen since monday. Patient reports history of kidney stones and painful similar nature. Patient reports that she went to [address] [place] [address] yesterday and was told that she had blood in her urine and was instructed to come to the emergency department. Patient reports that she felt so bad she just went home to go to sleep. Patient reports generalized body aches and low-grade temp. Patient reports urinary urgency and frequency. Patient reports that she did notice some dysuria today. No nausea, vomiting, diarrhea. No numbness or tingling in extremities. No unilateral weakness. No cough, congestion, or sore throat. Patient reports right-sided ear pain and reports when she was at [address] [place] [address] the other day they told her she would need antibiotics when she came to the emergency department for evaluation. No other complaints, modifying factors or associated symptoms. Nursing notes reviewed.    Past Medical History:   Diagnosis Date    Cataract     Depression     Genital herpes     one outbreak in     Kidney stone     Pityriasis versicolor      Past Surgical History:   Procedure Laterality Date    EYE SURGERY      cataract Rt eye    INDUCED       KIDNEY STONE SURGERY       Family History   Problem Relation Age of Onset    Stroke Father 62    Diabetes Sister 23        type 1     Diabetes Paternal Grandmother      Social History     Socioeconomic History    Marital status: Single     Spouse name: Not on file    Number of children: Not on file    Years of education: Not on file    Highest education level: Not on file   Occupational History    Not on file   Social Needs    Financial resource strain: Not on file    Food insecurity:     Worry: Not on file     Inability: Not on file    Transportation needs:     Medical: Not on file     Non-medical: Not on file   Tobacco Use    Smoking status: Current Every Day Smoker     Packs/day: 0.50    Smokeless tobacco: Never Used   Substance and Sexual Activity    Alcohol use: Yes     Comment: occasion    Drug use: Yes     Types: Marijuana    Sexual activity: Not on file   Lifestyle    Physical activity:     Days per week: Not on file     Minutes per session: Not on file    Stress: Not on file   Relationships    Social connections:     Talks on phone: Not on file     Gets together: Not on file     Attends Mosque service: Not on file     Active member of club or organization: Not on file     Attends meetings of clubs or organizations: Not on file     Relationship status: Not on file    Intimate partner violence:     Fear of current or ex partner: Not on file     Emotionally abused: Not on file     Physically abused: Not on file     Forced sexual activity: Not on file   Other Topics Concern    Not on file   Social History Narrative    Not on file     No current facility-administered medications for this encounter. Current Outpatient Medications   Medication Sig Dispense Refill    FLUoxetine (PROZAC) 40 MG capsule TAKE 1 CAPSULE BY MOUTH ONE TIME A DAY  30 capsule 0    Melatonin 5 MG CAPS Take by mouth nightly        Allergies   Allergen Reactions    Penicillins Nausea And Vomiting       REVIEW OF SYSTEMS  10 systems reviewed, pertinent positives per HPI otherwise noted to be negative    PHYSICAL EXAM  /88   Pulse 98   Temp 97.8 °F (36.6 °C) (Oral)   Resp 18   Wt 290 lb (131.5 kg)   SpO2 96%   BMI 45.42 kg/m²   GENERAL APPEARANCE: Awake and alert. Cooperative. No acute distress. Non toxic in appearance.  Speech answered. Patient will follow up with PCP for further evaluation/treatment. Patient will return to ED for new/worsening symptoms. Patient comfortable upon discharge. Strict return precautions were discussed in detail. Patient agreeable with plan of care, treatment, and discharge at this time. Patient was instructed to take ibuprofen and Tylenol as needed for pain. Patient was sent home with a prescription for macrobid and pyridium.     MDM  Results for orders placed or performed during the hospital encounter of 02/26/20   Urinalysis, reflex to microscopic   Result Value Ref Range    Color, UA Yellow Straw/Yellow    Clarity, UA CLOUDY (A) Clear    Glucose, Ur Negative Negative mg/dL    Bilirubin Urine Negative Negative    Ketones, Urine Negative Negative mg/dL    Specific Gravity, UA 1.020 1.005 - 1.030    Blood, Urine SMALL (A) Negative    pH, UA 7.0 5.0 - 8.0    Protein, UA Negative Negative mg/dL    Urobilinogen, Urine 0.2 <2.0 E.U./dL    Nitrite, Urine POSITIVE (A) Negative    Leukocyte Esterase, Urine SMALL (A) Negative    Microscopic Examination YES     Urine Type NotGiven    HCG Qualitative, Serum   Result Value Ref Range    hCG Qual Negative Detects HCG level >10 MIU/mL   CBC auto differential   Result Value Ref Range    WBC 7.9 4.0 - 11.0 K/uL    RBC 4.67 4.00 - 5.20 M/uL    Hemoglobin 15.1 12.0 - 16.0 g/dL    Hematocrit 44.0 36.0 - 48.0 %    MCV 94.1 80.0 - 100.0 fL    MCH 32.4 26.0 - 34.0 pg    MCHC 34.4 31.0 - 36.0 g/dL    RDW 13.7 12.4 - 15.4 %    Platelets 847 703 - 852 K/uL    MPV 8.8 5.0 - 10.5 fL    Neutrophils % 69.3 %    Lymphocytes % 21.6 %    Monocytes % 6.3 %    Eosinophils % 2.5 %    Basophils % 0.3 %    Neutrophils Absolute 5.5 1.7 - 7.7 K/uL    Lymphocytes Absolute 1.7 1.0 - 5.1 K/uL    Monocytes Absolute 0.5 0.0 - 1.3 K/uL    Eosinophils Absolute 0.2 0.0 - 0.6 K/uL    Basophils Absolute 0.0 0.0 - 0.2 K/uL   Comprehensive Metabolic Panel   Result Value Ref Range    Sodium 139 136 - 145 mmol/L    Potassium 3.9 3.5 - 5.1 mmol/L    Chloride 103 99 - 110 mmol/L    CO2 24 21 - 32 mmol/L    Anion Gap 12 3 - 16    Glucose 97 70 - 99 mg/dL    BUN 10 7 - 20 mg/dL    CREATININE 0.7 0.6 - 1.1 mg/dL    GFR Non-African American >60 >60    GFR African American >60 >60    Calcium 9.2 8.3 - 10.6 mg/dL    Total Protein 7.3 6.4 - 8.2 g/dL    Alb 4.1 3.4 - 5.0 g/dL    Albumin/Globulin Ratio 1.3 1.1 - 2.2    Total Bilirubin 1.0 0.0 - 1.0 mg/dL    Alkaline Phosphatase 86 40 - 129 U/L    ALT 16 10 - 40 U/L    AST 13 (L) 15 - 37 U/L    Globulin 3.2 g/dL   Microscopic Urinalysis   Result Value Ref Range    WBC, UA 11-20 (A) 0 - 5 /HPF    RBC, UA 3-4 0 - 4 /HPF    Epi Cells 11-20 (A) 0 - 5 /HPF    Bacteria, UA 3+ (A) None Seen /HPF       I estimate there is LOW risk for ACUTE APPENDICITIS, BOWEL OBSTRUCTION, CHOLECYSTITIS, DIVERTICULITIS, INCARCERATED HERNIA, PANCREATITIS, or PERFORATED BOWEL or ULCER, thus I consider the discharge disposition reasonable. Also, there is no evidence or peritonitis, sepsis, or toxicity. Janay Fong and I have discussed the diagnosis and risks, and we agree with discharging home to follow-up with their primary doctor. We also discussed returning to the Emergency Department immediately if new or worsening symptoms occur. We have discussed the symptoms which are most concerning (e.g., bloody stool, fever, changing or worsening pain, vomiting) that necessitate immediate return. FINAL Impression    1. Urinary tract infection with hematuria, site unspecified    2. Flank pain        Blood pressure 110/78, pulse 69, temperature 97.4 °F (36.3 °C), temperature source Oral, resp. rate 18, weight 290 lb (131.5 kg), last menstrual period 02/12/2020, SpO2 99 %, not currently breastfeeding. DISPOSITION  Patient was discharged to home in good condition.           Phyllis Rosales, JAYE - CNP  02/26/20 1921

## 2020-02-28 LAB
ORGANISM: ABNORMAL
URINE CULTURE, ROUTINE: ABNORMAL

## 2020-05-05 ENCOUNTER — TELEPHONE (OUTPATIENT)
Dept: FAMILY MEDICINE CLINIC | Age: 35
End: 2020-05-05

## 2020-05-06 ENCOUNTER — VIRTUAL VISIT (OUTPATIENT)
Dept: FAMILY MEDICINE CLINIC | Age: 35
End: 2020-05-06
Payer: COMMERCIAL

## 2020-05-06 VITALS — BODY MASS INDEX: 45.99 KG/M2 | HEIGHT: 67 IN | WEIGHT: 293 LBS

## 2020-05-06 PROCEDURE — 99213 OFFICE O/P EST LOW 20 MIN: CPT | Performed by: FAMILY MEDICINE

## 2020-05-06 ASSESSMENT — ENCOUNTER SYMPTOMS: SHORTNESS OF BREATH: 0

## 2020-05-06 NOTE — PROGRESS NOTES
2020    TELEHEALTH EVALUATION -- Audio/Visual (During COLAD-63 public health emergency)    HPI:    Calderon Joiner (:  1985) has requested an audio/video evaluation for the following concern(s):    History of anxiety depression reports compliance with Prozac 40 mg without medication side effects. Feels like the medication is working well. Denies SI/HI. Review of Systems   Constitutional: Negative for chills and fever. Respiratory: Negative for shortness of breath. Cardiovascular: Negative for chest pain. Prior to Visit Medications    Medication Sig Taking? Authorizing Provider   Apremilast 30 MG TABS Take 30 mg by mouth Take 30 mg 2 times daily Yes Historical Provider, MD   FLUoxetine (PROZAC) 40 MG capsule TAKE 1 CAPSULE BY MOUTH ONE TIME A DAY  Yes Micah Singh MD   Melatonin 5 MG CAPS Take by mouth nightly  Yes Historical Provider, MD       Social History     Tobacco Use    Smoking status: Current Every Day Smoker     Packs/day: 0.50    Smokeless tobacco: Never Used   Substance Use Topics    Alcohol use: Yes     Comment: occasion    Drug use: Yes     Types: Marijuana        Allergies   Allergen Reactions    Penicillins Nausea And Vomiting       PHYSICAL EXAMINATION:  [ INSTRUCTIONS:  \"[x]\" Indicates a positive item  \"[]\" Indicates a negative item  -- DELETE ALL ITEMS NOT EXAMINED]  Vital Signs: (As obtained by patient/caregiver or practitioner observation)    Blood pressure-  Heart rate-    Respiratory rate-    Temperature-  Pulse oximetry-     Constitutional: [] Appears well-developed and well-nourished [] No apparent distress      [] Abnormal-   Mental status  [] Alert and awake  [] Oriented to person/place/time []Able to follow commands      Eyes:  EOM    []  Normal  [] Abnormal-  Sclera  []  Normal  [] Abnormal -         Discharge []  None visible  [] Abnormal -    HENT:   [] Normocephalic, atraumatic.   [] Abnormal   [] Mouth/Throat: Mucous membranes are moist. External Ears [] Normal  [] Abnormal-     Neck: [] No visualized mass     Pulmonary/Chest: [] Respiratory effort normal.  [] No visualized signs of difficulty breathing or respiratory distress        [] Abnormal-      Musculoskeletal:   [] Normal gait with no signs of ataxia         [] Normal range of motion of neck        [] Abnormal-       Neurological:        [] No Facial Asymmetry (Cranial nerve 7 motor function) (limited exam to video visit)          [] No gaze palsy        [] Abnormal-         Skin:        [] No significant exanthematous lesions or discoloration noted on facial skin         [] Abnormal-            Psychiatric:       [] Normal Affect [] No Hallucinations        [] Abnormal-     Other pertinent observable physical exam findings-     ASSESSMENT/PLAN:  1. Anxiety and depression  Continue current regimen      Return in about 6 months (around 11/6/2020). Mathew Gitelman is a 36 y.o. female being evaluated by a Virtual Visit (video visit) encounter to address concerns as mentioned above. A caregiver was present when appropriate. Due to this being a TeleHealth encounter (During [place] [address] public health emergency), evaluation of the following organ systems was limited: Vitals/Constitutional/EENT/Resp/CV/GI//MS/Neuro/Skin/Heme-Lymph-Imm. Pursuant to the emergency declaration under the [address] [place] [address] authority and the [employer] and [employer] Act, this Virtual Visit was conducted with patient's (and/or legal guardian's) consent, to reduce the patient's risk of exposure to COVID-19 and provide necessary medical care. The patient (and/or legal guardian) has also been advised to contact this office for worsening conditions or problems, and seek emergency medical treatment and/or call 911 if deemed necessary.      Patient identification was verified at the start of the visit: Yes    Total time spent on this

## 2020-07-28 ENCOUNTER — VIRTUAL VISIT (OUTPATIENT)
Dept: FAMILY MEDICINE CLINIC | Age: 35
End: 2020-07-28
Payer: COMMERCIAL

## 2020-07-28 PROCEDURE — 99214 OFFICE O/P EST MOD 30 MIN: CPT | Performed by: FAMILY MEDICINE

## 2020-07-28 RX ORDER — BUSPIRONE HYDROCHLORIDE 5 MG/1
5 TABLET ORAL 3 TIMES DAILY
Qty: 90 TABLET | Refills: 0 | Status: SHIPPED | OUTPATIENT
Start: 2020-07-28 | End: 2020-08-18 | Stop reason: ALTCHOICE

## 2020-07-28 ASSESSMENT — ENCOUNTER SYMPTOMS: SHORTNESS OF BREATH: 0

## 2020-07-28 NOTE — PROGRESS NOTES
2020    TELEHEALTH EVALUATION -- Audio/Visual (During UUMFZ-06 public health emergency)    HPI:    Awilda Benoit (:  1985) has requested an audio/video evaluation for the following concern(s):    History of anxiety depression reports compliance with Prozac 40 mg without medication side effects.  Patient reports that she has been feeling out of her element lately. She is not sure if this is because she has been busy at work and has had added stress because her coworker quit. She also feels it may be a combination with the cold it and locked down. She states she feels anxious and is is sleeping all the time and still feels tired.  Denies SI/HI. Review of Systems   Constitutional: Negative for chills and fever. Respiratory: Negative for shortness of breath. Cardiovascular: Negative for chest pain. Psychiatric/Behavioral: Positive for behavioral problems. Prior to Visit Medications    Medication Sig Taking?  Authorizing Provider   busPIRone (BUSPAR) 5 MG tablet Take 1 tablet by mouth 3 times daily Yes Juan Pablo Pompa MD   FLUoxetine (PROZAC) 40 MG capsule TAKE 1 CAPSULE BY MOUTH ONE TIME A DAY   Juan Pablo Pompa MD   Apremilast 30 MG TABS Take 30 mg by mouth Take 30 mg 2 times daily  Historical Provider, MD   Melatonin 5 MG CAPS Take by mouth nightly   Historical Provider, MD       Social History     Tobacco Use    Smoking status: Current Every Day Smoker     Packs/day: 0.50    Smokeless tobacco: Never Used   Substance Use Topics    Alcohol use: Yes     Comment: occasion    Drug use: Yes     Types: Marijuana        Allergies   Allergen Reactions    Penicillins Nausea And Vomiting       PHYSICAL EXAMINATION:  [ INSTRUCTIONS:  \"[x]\" Indicates a positive item  \"[]\" Indicates a negative item  -- DELETE ALL ITEMS NOT EXAMINED]  Vital Signs: (As obtained by patient/caregiver or practitioner observation)    Blood pressure-  Heart rate-    Respiratory rate-    Temperature-  Pulse Vitals/Constitutional/EENT/Resp/CV/GI//MS/Neuro/Skin/Heme-Lymph-Imm. Pursuant to the emergency declaration under the [address] [place] [address], [address] [place] [address] and the [employer] and [employer] Act, this Virtual Visit was conducted with patient's (and/or legal guardian's) consent, to reduce the patient's risk of exposure to COVID-19 and provide necessary medical care. The patient (and/or legal guardian) has also been advised to contact this office for worsening conditions or problems, and seek emergency medical treatment and/or call 911 if deemed necessary. Patient identification was verified at the start of the visit: Yes    Total time spent on this encounter: 25 minutes    Services were provided through a video synchronous discussion virtually to substitute for in-person clinic visit. Patient and provider were located at their individual homes. --Oh Hope MD on 7/28/2020 at 3:45 PM    An electronic signature was used to authenticate this note.

## 2020-08-18 ENCOUNTER — APPOINTMENT (OUTPATIENT)
Dept: GENERAL RADIOLOGY | Age: 35
End: 2020-08-18
Payer: COMMERCIAL

## 2020-08-18 ENCOUNTER — HOSPITAL ENCOUNTER (EMERGENCY)
Age: 35
Discharge: HOME | End: 2020-08-18
Payer: COMMERCIAL

## 2020-08-18 ENCOUNTER — APPOINTMENT (OUTPATIENT)
Dept: CT IMAGING | Age: 35
End: 2020-08-18
Payer: COMMERCIAL

## 2020-08-18 VITALS
OXYGEN SATURATION: 97 % | RESPIRATION RATE: 16 BRPM | HEIGHT: 67 IN | WEIGHT: 293 LBS | HEART RATE: 90 BPM | SYSTOLIC BLOOD PRESSURE: 111 MMHG | BODY MASS INDEX: 45.99 KG/M2 | TEMPERATURE: 98 F | DIASTOLIC BLOOD PRESSURE: 70 MMHG

## 2020-08-18 LAB
A/G RATIO: 1.2 (ref 1.1–2.2)
ALBUMIN SERPL-MCNC: 4.2 G/DL (ref 3.4–5)
ALP BLD-CCNC: 95 U/L (ref 40–129)
ALT SERPL-CCNC: 25 U/L (ref 10–40)
ANION GAP SERPL CALCULATED.3IONS-SCNC: 14 MMOL/L (ref 3–16)
AST SERPL-CCNC: 16 U/L (ref 15–37)
BACTERIA: ABNORMAL /HPF
BASOPHILS ABSOLUTE: 0 K/UL (ref 0–0.2)
BASOPHILS RELATIVE PERCENT: 0.3 %
BILIRUB SERPL-MCNC: 1.3 MG/DL (ref 0–1)
BILIRUBIN URINE: NEGATIVE
BLOOD, URINE: ABNORMAL
BUN BLDV-MCNC: 5 MG/DL (ref 7–20)
CALCIUM SERPL-MCNC: 9.1 MG/DL (ref 8.3–10.6)
CHLORIDE BLD-SCNC: 98 MMOL/L (ref 99–110)
CLARITY: ABNORMAL
CO2: 22 MMOL/L (ref 21–32)
COLOR: YELLOW
CREAT SERPL-MCNC: 0.7 MG/DL (ref 0.6–1.1)
EKG ATRIAL RATE: 105 BPM
EKG DIAGNOSIS: NORMAL
EKG P AXIS: 30 DEGREES
EKG P-R INTERVAL: 136 MS
EKG Q-T INTERVAL: 322 MS
EKG QRS DURATION: 68 MS
EKG QTC CALCULATION (BAZETT): 425 MS
EKG R AXIS: 80 DEGREES
EKG T AXIS: 51 DEGREES
EKG VENTRICULAR RATE: 105 BPM
EOSINOPHILS ABSOLUTE: 0.1 K/UL (ref 0–0.6)
EOSINOPHILS RELATIVE PERCENT: 0.7 %
EPITHELIAL CELLS, UA: ABNORMAL /HPF (ref 0–5)
GFR AFRICAN AMERICAN: >60
GFR NON-AFRICAN AMERICAN: >60
GLOBULIN: 3.4 G/DL
GLUCOSE BLD-MCNC: 145 MG/DL (ref 70–99)
GLUCOSE URINE: NEGATIVE MG/DL
HCG QUALITATIVE: NEGATIVE
HCT VFR BLD CALC: 43.5 % (ref 36–48)
HEMOGLOBIN: 14.7 G/DL (ref 12–16)
KETONES, URINE: NEGATIVE MG/DL
LACTIC ACID, SEPSIS: 0.8 MMOL/L (ref 0.4–1.9)
LACTIC ACID, SEPSIS: 2.7 MMOL/L (ref 0.4–1.9)
LEUKOCYTE ESTERASE, URINE: ABNORMAL
LIPASE: 24 U/L (ref 13–60)
LYMPHOCYTES ABSOLUTE: 1.3 K/UL (ref 1–5.1)
LYMPHOCYTES RELATIVE PERCENT: 12.5 %
MCH RBC QN AUTO: 32.2 PG (ref 26–34)
MCHC RBC AUTO-ENTMCNC: 33.8 G/DL (ref 31–36)
MCV RBC AUTO: 95.1 FL (ref 80–100)
MICROSCOPIC EXAMINATION: YES
MONOCYTES ABSOLUTE: 0.8 K/UL (ref 0–1.3)
MONOCYTES RELATIVE PERCENT: 7.6 %
NEUTROPHILS ABSOLUTE: 8.1 K/UL (ref 1.7–7.7)
NEUTROPHILS RELATIVE PERCENT: 78.9 %
NITRITE, URINE: NEGATIVE
PDW BLD-RTO: 15.5 % (ref 12.4–15.4)
PH UA: 7 (ref 5–8)
PLATELET # BLD: 235 K/UL (ref 135–450)
PMV BLD AUTO: 8.8 FL (ref 5–10.5)
POTASSIUM SERPL-SCNC: 3.9 MMOL/L (ref 3.5–5.1)
PROTEIN UA: NEGATIVE MG/DL
RBC # BLD: 4.57 M/UL (ref 4–5.2)
RBC UA: ABNORMAL /HPF (ref 0–4)
SODIUM BLD-SCNC: 134 MMOL/L (ref 136–145)
SPECIFIC GRAVITY UA: 1.01 (ref 1–1.03)
TOTAL PROTEIN: 7.6 G/DL (ref 6.4–8.2)
TROPONIN: <0.01 NG/ML
URINE TYPE: ABNORMAL
UROBILINOGEN, URINE: 0.2 E.U./DL
WBC # BLD: 10.2 K/UL (ref 4–11)
WBC UA: ABNORMAL /HPF (ref 0–5)

## 2020-08-18 PROCEDURE — 6360000004 HC RX CONTRAST MEDICATION: Performed by: NURSE PRACTITIONER

## 2020-08-18 PROCEDURE — 84484 ASSAY OF TROPONIN QUANT: CPT

## 2020-08-18 PROCEDURE — 80053 COMPREHEN METABOLIC PANEL: CPT

## 2020-08-18 PROCEDURE — U0003 INFECTIOUS AGENT DETECTION BY NUCLEIC ACID (DNA OR RNA); SEVERE ACUTE RESPIRATORY SYNDROME CORONAVIRUS 2 (SARS-COV-2) (CORONAVIRUS DISEASE [COVID-19]), AMPLIFIED PROBE TECHNIQUE, MAKING USE OF HIGH THROUGHPUT TECHNOLOGIES AS DESCRIBED BY CMS-2020-01-R: HCPCS

## 2020-08-18 PROCEDURE — 81001 URINALYSIS AUTO W/SCOPE: CPT

## 2020-08-18 PROCEDURE — 85025 COMPLETE CBC W/AUTO DIFF WBC: CPT

## 2020-08-18 PROCEDURE — 2580000003 HC RX 258: Performed by: NURSE PRACTITIONER

## 2020-08-18 PROCEDURE — 6360000002 HC RX W HCPCS: Performed by: NURSE PRACTITIONER

## 2020-08-18 PROCEDURE — 99285 EMERGENCY DEPT VISIT HI MDM: CPT

## 2020-08-18 PROCEDURE — 96375 TX/PRO/DX INJ NEW DRUG ADDON: CPT

## 2020-08-18 PROCEDURE — 6370000000 HC RX 637 (ALT 250 FOR IP): Performed by: NURSE PRACTITIONER

## 2020-08-18 PROCEDURE — 93005 ELECTROCARDIOGRAM TRACING: CPT | Performed by: NURSE PRACTITIONER

## 2020-08-18 PROCEDURE — 71045 X-RAY EXAM CHEST 1 VIEW: CPT

## 2020-08-18 PROCEDURE — 96374 THER/PROPH/DIAG INJ IV PUSH: CPT

## 2020-08-18 PROCEDURE — 83605 ASSAY OF LACTIC ACID: CPT

## 2020-08-18 PROCEDURE — 71260 CT THORAX DX C+: CPT

## 2020-08-18 PROCEDURE — 93010 ELECTROCARDIOGRAM REPORT: CPT | Performed by: INTERNAL MEDICINE

## 2020-08-18 PROCEDURE — 87086 URINE CULTURE/COLONY COUNT: CPT

## 2020-08-18 PROCEDURE — 84703 CHORIONIC GONADOTROPIN ASSAY: CPT

## 2020-08-18 PROCEDURE — 83690 ASSAY OF LIPASE: CPT

## 2020-08-18 PROCEDURE — 74177 CT ABD & PELVIS W/CONTRAST: CPT

## 2020-08-18 RX ORDER — ASPIRIN 81 MG/1
81 TABLET, CHEWABLE ORAL DAILY
Qty: 7 TABLET | Refills: 0 | Status: SHIPPED | OUTPATIENT
Start: 2020-08-18 | End: 2020-08-18 | Stop reason: SDUPTHER

## 2020-08-18 RX ORDER — ASPIRIN 81 MG/1
81 TABLET, CHEWABLE ORAL DAILY
Qty: 14 TABLET | Refills: 0 | Status: SHIPPED | OUTPATIENT
Start: 2020-08-18 | End: 2020-10-08

## 2020-08-18 RX ORDER — DIPHENHYDRAMINE HYDROCHLORIDE 50 MG/ML
12.5 INJECTION INTRAMUSCULAR; INTRAVENOUS ONCE
Status: COMPLETED | OUTPATIENT
Start: 2020-08-18 | End: 2020-08-18

## 2020-08-18 RX ORDER — DICYCLOMINE HYDROCHLORIDE 10 MG/1
10 CAPSULE ORAL EVERY 6 HOURS PRN
Qty: 20 CAPSULE | Refills: 0 | Status: SHIPPED | OUTPATIENT
Start: 2020-08-18 | End: 2020-10-08

## 2020-08-18 RX ORDER — ONDANSETRON 4 MG/1
4 TABLET, ORALLY DISINTEGRATING ORAL EVERY 8 HOURS PRN
Qty: 20 TABLET | Refills: 0 | Status: SHIPPED | OUTPATIENT
Start: 2020-08-18 | End: 2020-10-08

## 2020-08-18 RX ORDER — ALBUTEROL SULFATE 90 UG/1
2 AEROSOL, METERED RESPIRATORY (INHALATION) 4 TIMES DAILY PRN
Qty: 1 INHALER | Refills: 0 | Status: SHIPPED | OUTPATIENT
Start: 2020-08-18 | End: 2020-10-08

## 2020-08-18 RX ORDER — KETOROLAC TROMETHAMINE 30 MG/ML
15 INJECTION, SOLUTION INTRAMUSCULAR; INTRAVENOUS ONCE
Status: COMPLETED | OUTPATIENT
Start: 2020-08-18 | End: 2020-08-18

## 2020-08-18 RX ORDER — METOCLOPRAMIDE HYDROCHLORIDE 5 MG/ML
10 INJECTION INTRAMUSCULAR; INTRAVENOUS ONCE
Status: COMPLETED | OUTPATIENT
Start: 2020-08-18 | End: 2020-08-18

## 2020-08-18 RX ORDER — CLINDAMYCIN HYDROCHLORIDE 300 MG/1
300 CAPSULE ORAL 3 TIMES DAILY
COMMUNITY
Start: 2020-08-11 | End: 2020-08-21

## 2020-08-18 RX ORDER — ASPIRIN 81 MG/1
81 TABLET, CHEWABLE ORAL ONCE
Status: COMPLETED | OUTPATIENT
Start: 2020-08-18 | End: 2020-08-18

## 2020-08-18 RX ORDER — ACETAMINOPHEN 500 MG
1000 TABLET ORAL ONCE
Status: COMPLETED | OUTPATIENT
Start: 2020-08-18 | End: 2020-08-18

## 2020-08-18 RX ORDER — M-VIT,TX,IRON,MINS/CALC/FOLIC 27MG-0.4MG
1 TABLET ORAL DAILY
COMMUNITY

## 2020-08-18 RX ORDER — ACETAMINOPHEN 160 MG
TABLET,DISINTEGRATING ORAL
COMMUNITY
End: 2021-05-10 | Stop reason: CLARIF

## 2020-08-18 RX ORDER — DEXAMETHASONE SODIUM PHOSPHATE 10 MG/ML
10 INJECTION INTRAMUSCULAR; INTRAVENOUS ONCE
Status: COMPLETED | OUTPATIENT
Start: 2020-08-18 | End: 2020-08-18

## 2020-08-18 RX ORDER — 0.9 % SODIUM CHLORIDE 0.9 %
1000 INTRAVENOUS SOLUTION INTRAVENOUS ONCE
Status: COMPLETED | OUTPATIENT
Start: 2020-08-18 | End: 2020-08-18

## 2020-08-18 RX ADMIN — KETOROLAC TROMETHAMINE 15 MG: 30 INJECTION, SOLUTION INTRAMUSCULAR at 15:28

## 2020-08-18 RX ADMIN — ACETAMINOPHEN 1000 MG: 500 TABLET ORAL at 13:57

## 2020-08-18 RX ADMIN — DIPHENHYDRAMINE HYDROCHLORIDE 12.5 MG: 50 INJECTION, SOLUTION INTRAMUSCULAR; INTRAVENOUS at 13:57

## 2020-08-18 RX ADMIN — METOCLOPRAMIDE HYDROCHLORIDE 10 MG: 5 INJECTION INTRAMUSCULAR; INTRAVENOUS at 13:57

## 2020-08-18 RX ADMIN — IOPAMIDOL 75 ML: 755 INJECTION, SOLUTION INTRAVENOUS at 15:16

## 2020-08-18 RX ADMIN — DEXAMETHASONE SODIUM PHOSPHATE 10 MG: 10 INJECTION INTRAMUSCULAR; INTRAVENOUS at 16:56

## 2020-08-18 RX ADMIN — SODIUM CHLORIDE 1000 ML: 9 INJECTION, SOLUTION INTRAVENOUS at 13:57

## 2020-08-18 RX ADMIN — ASPIRIN 81 MG: 81 TABLET, CHEWABLE ORAL at 16:56

## 2020-08-18 ASSESSMENT — PAIN DESCRIPTION - LOCATION
LOCATION: HIP;BACK
LOCATION: HIP;BACK
LOCATION: HIP

## 2020-08-18 ASSESSMENT — PAIN SCALES - GENERAL
PAINLEVEL_OUTOF10: 5
PAINLEVEL_OUTOF10: 9
PAINLEVEL_OUTOF10: 9
PAINLEVEL_OUTOF10: 3

## 2020-08-18 ASSESSMENT — PAIN DESCRIPTION - PAIN TYPE
TYPE: ACUTE PAIN

## 2020-08-18 NOTE — ED PROVIDER NOTES
 Not on file   Social Needs    Financial resource strain: Not on file    Food insecurity     Worry: Not on file     Inability: Not on file    Transportation needs     Medical: Not on file     Non-medical: Not on file   Tobacco Use    Smoking status: Current Every Day Smoker     Packs/day: 0.75    Smokeless tobacco: Never Used   Substance and Sexual Activity    Alcohol use: Not Currently     Comment: very rare    Drug use: Yes     Types: Marijuana     Comment: daily    Sexual activity: Not on file   Lifestyle    Physical activity     Days per week: Not on file     Minutes per session: Not on file    Stress: Not on file   Relationships    Social connections     Talks on phone: Not on file     Gets together: Not on file     Attends Buddhist service: Not on file     Active member of club or organization: Not on file     Attends meetings of clubs or organizations: Not on file     Relationship status: Not on file    Intimate partner violence     Fear of current or ex partner: Not on file     Emotionally abused: Not on file     Physically abused: Not on file     Forced sexual activity: Not on file   Other Topics Concern    Not on file   Social History Narrative    Not on file     No current facility-administered medications for this encounter.       Current Outpatient Medications   Medication Sig Dispense Refill    clindamycin (CLEOCIN) 300 MG capsule Take 300 mg by mouth 3 times daily      Multiple Vitamins-Minerals (THERAPEUTIC MULTIVITAMIN-MINERALS) tablet Take 1 tablet by mouth daily      BIOTIN PO Take by mouth      Cholecalciferol (VITAMIN D3) 50 MCG (2000 UT) CAPS Take by mouth      ondansetron (ZOFRAN ODT) 4 MG disintegrating tablet Take 1 tablet by mouth every 8 hours as needed for Nausea 20 tablet 0    dicyclomine (BENTYL) 10 MG capsule Take 1 capsule by mouth every 6 hours as needed (cramps) 20 capsule 0    albuterol sulfate HFA (VENTOLIN HFA) 108 (90 Base) MCG/ACT inhaler Inhale 2 puffs into the lungs 4 times daily as needed for Wheezing 1 Inhaler 0    aspirin (ASPIRIN CHILDRENS) 81 MG chewable tablet Take 1 tablet by mouth daily 14 tablet 0    FLUoxetine (PROZAC) 40 MG capsule TAKE 1 CAPSULE BY MOUTH ONE TIME A DAY  30 capsule 3    Melatonin 5 MG CAPS Take by mouth nightly        Allergies   Allergen Reactions    Penicillins Nausea And Vomiting       REVIEW OF SYSTEMS  10 systems reviewed, pertinent positives per HPI otherwise noted to be negative    PHYSICAL EXAM  /70   Pulse 90   Temp 98 °F (36.7 °C) (Oral)   Resp 16   Ht 5' 7\" (1.702 m)   Wt 300 lb (136.1 kg)   LMP 08/02/2020   SpO2 97%   BMI 46.99 kg/m²   GENERAL APPEARANCE: Awake and alert. Cooperative. No acute distress. Vital signs are stable. Well appearing and non toxic. HEAD: Normocephalic. Atraumatic. EYES: PERRL. EOM's grossly intact. ENT: Mucous membranes are moist.   NECK: Supple. Normal ROM. HEART: Tachycardic. Distal pulses are equal and intact. Cap refill less than 2 seconds. LUNGS: Respirations unlabored. CTAB. Good air exchange. Speaking comfortably in full sentences. No wheezing, rhonchi, rales, stridor. ABDOMEN: Soft. Non-distended. Non-tender. No guarding or rebound. No masses. No organomegaly. No rigidity. Bowel sounds present in all 4 quadrants. Negative latham's. Negative McBurney's point. Negative CVA tenderness. EXTREMITIES: No peripheral edema. Moves all extremities equally. All extremities neurovascularly intact. SKIN: Warm and dry. No acute rashes. NEUROLOGICAL: Alert and oriented. No gross facial drooping. Strength 5/5, sensation intact. PSYCHIATRIC: Normal mood and affect.     SCREENINGS       RADIOLOGY  Ct Abdomen Pelvis W Iv Contrast Additional Contrast? None    Result Date: 8/18/2020  EXAMINATION: CT OF THE ABDOMEN AND PELVIS WITH CONTRAST 8/18/2020 3:03 pm TECHNIQUE: CT of the abdomen and pelvis was performed with the administration of intravenous contrast. Multiplanar reformatted images are provided for review. Dose modulation, iterative reconstruction, and/or weight based adjustment of the mA/kV was utilized to reduce the radiation dose to as low as reasonably achievable. COMPARISON: CT abdomen and pelvis dated 02/26/2020. HISTORY: ORDERING SYSTEM PROVIDED HISTORY: n/v/d history of stones fever tachy TECHNOLOGIST PROVIDED HISTORY: Reason for exam:->n/v/d history of stones fever tachy Additional Contrast?->None Reason for Exam: body aches, fever, cold chills, nausea, vommiting, diarreah Acuity: Acute Type of Exam: Initial FINDINGS: CARDIOVASCULAR: The heart is normal in size. There is no pericardial effusion. The aorta and branch vessels are patent and normal in caliber. LUNG BASES: Multiple nodular opacities are seen scattered throughout the lung bases bilaterally. HEPATOBILIARY: The liver is normal in size. There are no focal hepatic lesions. There is no biliary ductal dilatation. The gallbladder is unremarkable. SPLEEN: Unremarkable. PANCREAS: Unremarkable ADRENAL GLANDS: Unremarkable. KIDNEYS: Kidneys are normal in size and contour and demonstrate symmetric enhancement. There is no hydronephrosis or nephrolithiasis. ABDOMINAL NODES: No adenopathy is appreciated. PELVIC ORGANS: The urinary bladder is unremarkable. The uterus is unremarkable. There is a left ovarian cyst. PERITONEUM/MESENTERY/BOWEL: The stomach is unremarkable. There is no bowel obstruction. There is no bowel wall thickening. The appendix is normal. There is diverticulosis without evidence of diverticulitis. BONES/SOFT TISSUES: There is no acute osseous or soft tissue abnormality. Multiple scattered nodular opacities scattered throughout the lung bases. Given the patient's history of fever this likely reflects an infectious etiology such as bacterial or viral pneumonia. Dedicated chest CT may be obtained for complete evaluation of the lungs.      Xr Chest Portable    Result Date: 8/18/2020  EXAMINATION: ONE XRAY VIEW OF THE CHEST 8/18/2020 1:25 pm COMPARISON: July 25, 2019 HISTORY: ORDERING SYSTEM PROVIDED HISTORY: sob, fever TECHNOLOGIST PROVIDED HISTORY: Reason for exam:->sob, fever Acute fever and shortness of breath. Initial encounter. FINDINGS: The cardiomediastinal silhouette is within normal range. Lungs are clear. There is no focal pulmonary consolidation, pleural effusion, pneumothorax, or evidence of airspace pulmonary edema. 1. No radiographic finding to account for patient's shortness of breath and fever. Ct Chest Pulmonary Embolism W Contrast    Result Date: 8/18/2020  EXAMINATION: CTA OF THE CHEST 8/18/2020 3:03 pm TECHNIQUE: CTA of the chest was performed after the administration of intravenous contrast.  Multiplanar reformatted images are provided for review. MIP images are provided for review. Dose modulation, iterative reconstruction, and/or weight based adjustment of the mA/kV was utilized to reduce the radiation dose to as low as reasonably achievable. COMPARISON: No prior chest CT for comparison. Comparison made to prior abdominal CTs dated 02/26/2020, 04/09/2019, 11/22/2016, and 10/03/2016 HISTORY: ORDERING SYSTEM PROVIDED HISTORY: possible covid TECHNOLOGIST PROVIDED HISTORY: Reason for exam:->possible covid Reason for Exam: body aches, fever, cold chills, nausea, vomiting, diarrhea Acuity: Acute Type of Exam: Initial FINDINGS: Pulmonary Arteries: Pulmonary arteries are adequately opacified for evaluation. No evidence of intraluminal filling defect to suggest pulmonary embolism. Main pulmonary artery is normal in caliber. Mediastinum: Thyroid is unremarkable. There is no pathologic lymphadenopathy within the chest or mediastinum. Intrathoracic aorta is unremarkable, without aneurysm or dissection. No pericardial abnormality is identified. Lungs/pleura: Central airways are patent.   Scattered subpleural curvilinear opacities throughout both lungs, with a based upon my scope of practice. Supervising physician was in the department for consultation as needed. Sharifa Case presented to the ED today with above noted complaints. Patient initially given Toradol, Reglan, Benadryl, Tylenol, sodium chloride bolus for her headache, nausea, and tachycardia. No active vomiting. Emergency department work-up CBC is unremarkable no leukocytosis, bandemia, anemia. CMP unremarkable no acute kidney injury or electrolyte abnormality. Pregnancy negative. Lactic acid is noted to be elevated at 2.7. Repeat 0.8 after sodium chloride bolus. Urinalysis does show trace amount of leukocytes with small amount of hematuria with 6-9 WBCs on microscopic with 11-20 epithelial cells I believe this is a contaminated sample and given patient is asymptomatic urine culture sent new antibiotic treatment at this time. CT of the chest social shows no evidence of pulmonary embolism mild atelectasis within both lungs without acute airspace disease or consolidation. Multiple scattered subcentimeter nodules throughout both lungs the largest measuring 8 mm within the lingula given patient's young age most likely infectious or inflammatory however metastatic nodules are less likely the consideration. Suggest follow-up in 3 to 6 months. I discussed these findings specifically with patient who verbalized understanding. CT of the abdomen and pelvis shows no acute abdominopelvic findings kidneys are normal in size stomach unremarkable no bowel obstruction no bowel wall thickening appendix is normal there is diverticulosis without evidence of diverticulitis. Chest x-ray shows no radiographic finding to account for patient's shortness of breath and fever. EKG interpreted by my attending physician, no ST elevation. Tachycardia resolved after fluid administration. No emesis or diarrhea during emergency department course.   Headache and nausea resolved after Toradol, Reglan, Benadryl, Tylenol discharge instructions.       Results for orders placed or performed during the hospital encounter of 08/18/20   Comprehensive Metabolic Panel   Result Value Ref Range    Sodium 134 (L) 136 - 145 mmol/L    Potassium 3.9 3.5 - 5.1 mmol/L    Chloride 98 (L) 99 - 110 mmol/L    CO2 22 21 - 32 mmol/L    Anion Gap 14 3 - 16    Glucose 145 (H) 70 - 99 mg/dL    BUN 5 (L) 7 - 20 mg/dL    CREATININE 0.7 0.6 - 1.1 mg/dL    GFR Non-African American >60 >60    GFR African American >60 >60    Calcium 9.1 8.3 - 10.6 mg/dL    Total Protein 7.6 6.4 - 8.2 g/dL    Alb 4.2 3.4 - 5.0 g/dL    Albumin/Globulin Ratio 1.2 1.1 - 2.2    Total Bilirubin 1.3 (H) 0.0 - 1.0 mg/dL    Alkaline Phosphatase 95 40 - 129 U/L    ALT 25 10 - 40 U/L    AST 16 15 - 37 U/L    Globulin 3.4 g/dL   CBC Auto Differential   Result Value Ref Range    WBC 10.2 4.0 - 11.0 K/uL    RBC 4.57 4.00 - 5.20 M/uL    Hemoglobin 14.7 12.0 - 16.0 g/dL    Hematocrit 43.5 36.0 - 48.0 %    MCV 95.1 80.0 - 100.0 fL    MCH 32.2 26.0 - 34.0 pg    MCHC 33.8 31.0 - 36.0 g/dL    RDW 15.5 (H) 12.4 - 15.4 %    Platelets 501 320 - 542 K/uL    MPV 8.8 5.0 - 10.5 fL    Neutrophils % 78.9 %    Lymphocytes % 12.5 %    Monocytes % 7.6 %    Eosinophils % 0.7 %    Basophils % 0.3 %    Neutrophils Absolute 8.1 (H) 1.7 - 7.7 K/uL    Lymphocytes Absolute 1.3 1.0 - 5.1 K/uL    Monocytes Absolute 0.8 0.0 - 1.3 K/uL    Eosinophils Absolute 0.1 0.0 - 0.6 K/uL    Basophils Absolute 0.0 0.0 - 0.2 K/uL   Urinalysis   Result Value Ref Range    Color, UA Yellow Straw/Yellow    Clarity, UA SL CLOUDY (A) Clear    Glucose, Ur Negative Negative mg/dL    Bilirubin Urine Negative Negative    Ketones, Urine Negative Negative mg/dL    Specific Gravity, UA 1.010 1.005 - 1.030    Blood, Urine SMALL (A) Negative    pH, UA 7.0 5.0 - 8.0    Protein, UA Negative Negative mg/dL    Urobilinogen, Urine 0.2 <2.0 E.U./dL    Nitrite, Urine Negative Negative    Leukocyte Esterase, Urine TRACE (A) Negative    Microscopic Examination YES     Urine Type NotGiven    HCG Qualitative, Serum   Result Value Ref Range    hCG Qual Negative Detects HCG level >10 MIU/mL   Lactate, Sepsis   Result Value Ref Range    Lactic Acid, Sepsis 2.7 (H) 0.4 - 1.9 mmol/L   Lactate, Sepsis   Result Value Ref Range    Lactic Acid, Sepsis 0.8 0.4 - 1.9 mmol/L   Lipase   Result Value Ref Range    Lipase 24.0 13.0 - 60.0 U/L   Troponin   Result Value Ref Range    Troponin <0.01 <0.01 ng/mL   Microscopic Urinalysis   Result Value Ref Range    WBC, UA 6-9 (A) 0 - 5 /HPF    RBC, UA 3-4 0 - 4 /HPF    Epithelial Cells, UA 11-20 (A) 0 - 5 /HPF    Bacteria, UA Rare (A) None Seen /HPF   EKG 12 Lead   Result Value Ref Range    Ventricular Rate 105 BPM    Atrial Rate 105 BPM    P-R Interval 136 ms    QRS Duration 68 ms    Q-T Interval 322 ms    QTc Calculation (Bazett) 425 ms    P Axis 30 degrees    R Axis 80 degrees    T Axis 51 degrees    Diagnosis       Baseline artifactSinus tachycardiaNonspecific T wave abnormalityNo previous ECGs availableConfirmed by Kady Thompson MD, Jayne Hewitt ([id number]) on 8/18/2020 4:59:01 PM         I estimate there is LOW risk for EPIGLOTTITIS, PNEUMONIA, MENINGITIS, OR URINARY TRACT INFECTION, thus I consider the discharge disposition reasonable. Also, there is no evidence or peritonitis, sepsis, or toxicity. Janay Fong and I have discussed the diagnosis and risks, and we agree with discharging home to follow-up with their primary doctor. We also discussed returning to the Emergency Department immediately if new or worsening symptoms occur. We have discussed the symptoms which are most concerning (e.g., changing or worsening pain, trouble swallowing or breating, neck stiffness, fever) that necessitate immediate return. Final Impression    1. Lung nodule seen on imaging study    2. Suspected COVID-19 virus infection    3. Tachycardia    4. Nausea vomiting and diarrhea    5.  Nonintractable headache, unspecified chronicity pattern, unspecified headache type        Discharge Vital Signs:  Blood pressure 111/70, pulse 90, temperature 98 °F (36.7 °C), temperature source Oral, resp. rate 16, height 5' 7\" (1.702 m), weight 300 lb (136.1 kg), last menstrual period 08/02/2020, SpO2 97 %, not currently breastfeeding.mdm    Patient was sent home with a prescription for below medication/s. I did [place] patient on appropriate use of these medication. Discharge Medication List as of 8/18/2020  4:50 PM      START taking these medications    Details   ondansetron (ZOFRAN ODT) 4 MG disintegrating tablet Take 1 tablet by mouth every 8 hours as needed for Nausea, Disp-20 tablet,R-0Print      dicyclomine (BENTYL) 10 MG capsule Take 1 capsule by mouth every 6 hours as needed (cramps), Disp-20 capsule,R-0Print      albuterol sulfate HFA (VENTOLIN HFA) 108 (90 Base) MCG/ACT inhaler Inhale 2 puffs into the lungs 4 times daily as needed for Wheezing, Disp-1 Inhaler,R-0Print                 FOLLOW UP  Jacob Pink MD  [address] [facility] [place] [address]  [phone]          [facility]  ED  [address]          DISPOSITION  Patient was discharged to home in good condition. Comment: Please note this report has been produced using speech recognition software and may contain errors related to that system including errors in grammar, punctuation, and spelling, as well as words and phrases that may be inappropriate. If there are any questions or concerns please feel free to contact the dictating provider for clarification.             JAYE De La Garza - CNP  08/18/20 [id number]

## 2020-08-18 NOTE — ED TRIAGE NOTES
Pt presents to ED today, pt states she drove herself here but could get a ride home from her sister if she needs to. Pt reports a fever all day yesterday \"spiked at 102, vomited all day yesterday but I haven't since 10 today, i can't eat, I can't sleep, my hips are radiating with pain and into back. I'm getting really sharp pains just anywhere. \" Pt also reports waking up at 0430 yesterday morning feeling bad. Pt c/o sob, cold chills and feeling hot. Pt states that she knows all the symptoms are concerning for covid but she hasn't felt this way before except when she had a urinary tract infection and was sick for days so is requesting to give a urine sample. Patient identified as a positive fall risk on the ED triage fall screening. Patient placed in fall precautions which includes:  yellow fall risk bracelet on wrist, yellow socks on feet, \"Be Safe\" sign placed on patient's door, and bed alarm placed under patient/alarm turned on. Patient instructed on importance of not getting out of bed or ambulating without assistance for safety. Med list completed with list provided by patient. All information is believed to be true and accurate.

## 2020-08-18 NOTE — ED NOTES
[place], NP at bedside updating pt on results and plan of care at this time.       [place]ito Park RN  08/18/20 [id number]
Pt not in room; in radiology dept     Shannan Todd, LUCIAL  08/18/20 [id number]
Pt resting comfortably in bed at this time. No distress noted. Pt states \"I feel much better than I did. \" Pt rates hip and back pain 4/10 at this time. Provided pt with 240ml ice water and crackers for po challenge per NP order. No further needs at this time. Will continue to monitor.       Mandeep Escalera RN  08/18/20 [id number]
None

## 2020-08-18 NOTE — ED PROVIDER NOTES
EKG: Sinus tachycardia rate of 105 bpm.  No ST elevation.   No prior     Jane Homans, MD  08/18/20 [id number]

## 2020-08-19 ENCOUNTER — CARE COORDINATION (OUTPATIENT)
Dept: FAMILY MEDICINE CLINIC | Age: 35
End: 2020-08-19

## 2020-08-19 LAB
SARS-COV-2, NAA: NOT DETECTED
URINE CULTURE, ROUTINE: NORMAL

## 2020-08-19 NOTE — CARE COORDINATION
Placed call to patient to follow up on recent ED visit. HIPPA compliant message left with RN contact information.     Kurtis Wright RN, MSN  Ambulatory Care Manager  [phone]

## 2020-10-08 ENCOUNTER — OFFICE VISIT (OUTPATIENT)
Dept: FAMILY MEDICINE CLINIC | Age: 35
End: 2020-10-08
Payer: COMMERCIAL

## 2020-10-08 VITALS
WEIGHT: 293 LBS | DIASTOLIC BLOOD PRESSURE: 82 MMHG | HEART RATE: 101 BPM | BODY MASS INDEX: 45.99 KG/M2 | TEMPERATURE: 96.8 F | HEIGHT: 67 IN | SYSTOLIC BLOOD PRESSURE: 118 MMHG | OXYGEN SATURATION: 99 %

## 2020-10-08 PROCEDURE — 99203 OFFICE O/P NEW LOW 30 MIN: CPT | Performed by: FAMILY MEDICINE

## 2020-10-08 PROCEDURE — 90471 IMMUNIZATION ADMIN: CPT | Performed by: FAMILY MEDICINE

## 2020-10-08 PROCEDURE — 90688 IIV4 VACCINE SPLT 0.5 ML IM: CPT | Performed by: FAMILY MEDICINE

## 2020-10-08 RX ORDER — HALOBETASOL PROPIONATE AND TAZAROTENE .1; .45 MG/G; MG/G
LOTION TOPICAL
COMMUNITY
Start: 2020-03-30 | End: 2020-10-08

## 2020-10-08 SDOH — HEALTH STABILITY: MENTAL HEALTH: HOW OFTEN DO YOU HAVE A DRINK CONTAINING ALCOHOL?: MONTHLY OR LESS

## 2020-10-08 ASSESSMENT — ENCOUNTER SYMPTOMS
BACK PAIN: 0
ABDOMINAL DISTENTION: 0
SHORTNESS OF BREATH: 0
BLOOD IN STOOL: 0
NAUSEA: 0
VOMITING: 0
CONSTIPATION: 0
RHINORRHEA: 0
COUGH: 0
ABDOMINAL PAIN: 0
WHEEZING: 0
TROUBLE SWALLOWING: 0
DIARRHEA: 0
SINUS PRESSURE: 0
CHEST TIGHTNESS: 0

## 2020-10-08 NOTE — PROGRESS NOTES
Rubin Dutton   30 y.o. female   1985    HPI:  Chief Complaint   Patient presents with   Wendel Aase New Doctor    Follow-Up from [facility] 8/11 for COVID symptoms. Came back negative.  Abnormal Chest X-ray       This is patient's first visit with me. She is here to establish care as her new PCP. Her previous PCP who is with [facility] ([facility] left the practice. She informed me that she was at [facility] on 8/18/2020 and arrived at the ER. She was able to drive herself there. She had reported of a very high fever of T-max 102, persistent vomiting for more than 24 hours, decreased appetite and sleep, low back pain with sciatica, shortness of breath, chills, etc.  CBC, lactate, troponin, lipase, COVID-19 were all normal.  Beta hCG was negative. UA showed pyuria with positive leukocyte esterase; however, epithelial cells were noted. CT chest showed multiple scattered nodular opacities of bilateral lung bases, measuring 8 mm in size. CT abdomen pelvis was unremarkable. She was prescribed at least 10 day course of Clindamycin. She did not have diarrhea while taking it or afterwards. When asked how she is currently doing, patient stated that she feels fine and back to normal.    She has a personal history of smoking about 15 cigarettes/day for some years. She has not been on albuterol inhaler or any maintenance inhalers. In general, she has no issues with shortness of breath at rest or on exertion. She denied orthopnea and issues with wheezing. No FMH of lung CA. no history of prior pneumonia. She has not had a PFT done before. She works at home for [employer]. She has hx of depression and has been on it since 2013. She stated she's in \"overall better place\" now, but has been taking fluoxetine. She expressed desire to be off the medication    PDMP shows hydrocodone-ibuprofen written on 5/14/2019 and tramadol 50 mg #12 tablets written on 6/4/2019.     Allergies Allergen Reactions    Penicillins Nausea And Vomiting       Current Outpatient Medications on File Prior to Visit   Medication Sig Dispense Refill    Multiple Vitamins-Minerals (THERAPEUTIC MULTIVITAMIN-MINERALS) tablet Take 1 tablet by mouth daily      BIOTIN PO Take by mouth      Cholecalciferol (VITAMIN D3) 50 MCG (2000 UT) CAPS Take by mouth      FLUoxetine (PROZAC) 40 MG capsule TAKE 1 CAPSULE BY MOUTH ONE TIME A DAY  30 capsule 3    Melatonin 5 MG CAPS Take by mouth nightly        No current facility-administered medications on file prior to visit. Family History   Problem Relation Age of Onset    Stroke Father 62    Diabetes Sister 23        type 1     Diabetes Paternal Grandmother      Social History     Tobacco Use    Smoking status: Current Every Day Smoker     Packs/day: 1.00     Types: Cigarettes    Smokeless tobacco: Never Used   Substance Use Topics    Alcohol use: Yes     Frequency: Monthly or less     Comment: very rare        No results for input(s): WBC, HGB, HCT, MCV, PLT in the last 720 hours. Chemistry        Component Value Date/Time     (L) 08/18/2020 1315    K 3.9 08/18/2020 1315    K 3.7 07/25/2019 1326    CL 98 (L) 08/18/2020 1315    CO2 22 08/18/2020 1315    BUN 5 (L) 08/18/2020 1315    CREATININE 0.7 08/18/2020 1315        Component Value Date/Time    CALCIUM 9.1 08/18/2020 1315    ALKPHOS 95 08/18/2020 1315    AST 16 08/18/2020 1315    ALT 25 08/18/2020 1315    BILITOT 1.3 (H) 08/18/2020 1315          Lab Results   Component Value Date    ALT 25 08/18/2020    AST 16 08/18/2020    ALKPHOS 95 08/18/2020    BILITOT 1.3 (H) 08/18/2020     Lab Results   Component Value Date    LABA1C 5.2 08/23/2019     Lab Results   Component Value Date    .5 08/23/2019       Review of Systems   Constitutional: Negative for activity change, appetite change, fatigue, fever and unexpected weight change.    HENT: Negative for congestion, rhinorrhea, sinus pressure and trouble swallowing. Respiratory: Negative for cough, chest tightness, shortness of breath and wheezing. Cardiovascular: Negative for chest pain, palpitations and leg swelling. Gastrointestinal: Negative for abdominal distention, abdominal pain, blood in stool, constipation, diarrhea, nausea and vomiting. Genitourinary: Negative for dysuria, frequency and hematuria. Musculoskeletal: Negative for arthralgias and back pain. Skin: Negative for rash. Neurological: Negative for dizziness, weakness, light-headedness, numbness and headaches. Wt Readings from Last 3 Encounters:   10/08/20 (!) 301 lb 3.2 oz (136.6 kg)   08/18/20 300 lb (136.1 kg)   05/06/20 300 lb (136.1 kg)       BP Readings from Last 3 Encounters:   10/08/20 118/82   08/18/20 111/70   02/26/20 110/78       Pulse Readings from Last 3 Encounters:   10/08/20 101   08/18/20 90   02/26/20 69       /82   Pulse 101   Temp 96.8 °F (36 °C)   Ht 5' 7\" (1.702 m)   Wt (!) 301 lb 3.2 oz (136.6 kg)   SpO2 99%   BMI 47.17 kg/m²      Physical Exam  Vitals signs reviewed. Constitutional:       General: She is awake. She is not in acute distress. Appearance: She is morbidly obese. She is not ill-appearing or diaphoretic. HENT:      Head: Normocephalic and atraumatic. Right Ear: External ear normal.      Left Ear: External ear normal.      Nose: Nose normal.   Eyes:      Extraocular Movements: Extraocular movements intact. Conjunctiva/sclera: Conjunctivae normal.   Neck:      Musculoskeletal: Normal range of motion. No erythema. Cardiovascular:      Rate and Rhythm: Normal rate and regular rhythm. Pulmonary:      Effort: Pulmonary effort is normal. No respiratory distress. Breath sounds: No wheezing, rhonchi or rales. Abdominal:      General: Abdomen is flat. There is no distension. Palpations: Abdomen is soft. Musculoskeletal: Normal range of motion. General: No deformity.       Right lower leg: No edema. Left lower leg: No edema. Skin:     Coloration: Skin is not cyanotic, jaundiced or pale. Findings: No abrasion, abscess, bruising, ecchymosis, erythema, signs of injury, laceration, lesion, petechiae, rash or wound. Neurological:      General: No focal deficit present. Mental Status: She is alert. Mental status is at baseline. Coordination: Coordination normal.   Psychiatric:         Attention and Perception: Attention and perception normal.         Mood and Affect: Mood and affect normal.         Speech: Speech normal.         Behavior: Behavior normal. Behavior is cooperative. Thought Content: Thought content normal.         Assessment/Plan:   Marly Grandchild was seen today for established new doctor, follow-up from hospital and abnormal chest x-ray. Diagnoses and all orders for this visit:    Bilateral pulmonary nodules greater than or equal to 8mm  Comments: Will repeat in 6-12 mo (mid Nov-mid Feb 2021). Currently asymptomatic and back to baseline. Likely related to history of smoking. Recurrent major depressive disorder, in full remission ([facility] [address]  Comments:  Stable and controlled. Advised to continue fluoxetine since she has great benefit from it. I've explained to her that drugs of the SSRI/SNRI class as well as anti-psychotics can have side effects such as weight gain, sexual dysfunction, insomnia, headache, abdominal discomfort, nausea, vomiting, etc. These medications are generally effective at alleviating symptoms of anxiety and/or depression, but increased anxiety can occur shortly after taking the medication(s) in some patients and should subside over time. Patient was informed to let me know if any significant side effects do occur.  Patient was instructed to take the medication every day as directed and that this medication is not an as needed medication because the medication may take at least 2 weeks to see a difference if not at least 4-6 weeks to have a beneficial effect and that by going even 1-2 days without taking the medication one could risk of having rebound anxiety/depression. In addition, the patient was informed that this medication cannot be abruptly stopped after having had taken it for a long period of time and that the medication would have to be gradually tapered off under the guidance of a healthcare provider. The patient was informed that 4-6 weeks follow-up is generally recommended follow-up time for starting/adding a new medication or with adjusting the dose of any given medication. The patient was also made aware that some patients may benefit from more than one medication compared to monotherapy as well as a combination of both medical and behavioral therapy may yield better/more effective results than either therapy alone. Lastly, the patient was made aware that the medication(s) prescribed will not completely resolve their anxiety/depression as well as make one immune or completely prevent from having more issues with anxiety and/or depression. Stress (emotional & physical) is part of everyday life. Educated about COVID-19 virus infection  Comments:  Educational information was provided to the patient regarding the signs and symptoms associated with COVID-19. Patient was also made aware that COVID-19 is highly contagious and is mainly contracted by being in close proximity (via respiratory droplets) to an infected person with COVID-19 and that the virus can be transmitted even if one is asymptomatic. Patient was also made aware that the virus can be transmitted via oral/body secretions. Patient was advised to wear a mask and gloves especially in large public settings (e.g. grocery store) and maintain social distancing (> 6 feet apart) as much as possible.   Patient was informed if one has any symptoms consistent with COVID-19 were to develop or if one has been exposed to a person, who tested positive for COVID-19 to go to one of [facility]'s nearest flu clinics to be evaluated and possibly be tested for COVID-19 (based on the discretion of the healthcare evaluator) or seek care with their PCP (via telemedicine), urgent care, or ER. Results may take 3-5 days (or even longer) depending on where the patient was tested. The patient was advised to make arrangements with family and/or friends to obtaining basic necessities (eg groceries) and that it is recommended that no one visit within the household to prevent possible exposure and transmission to others. Based on current CDC guidelines, at least 24 hours of being afebrile as well as improvement of overall symptoms is highly recommended before returning to work and minimum of 7 days (or longer) has elapsed since symptoms first started. Patient was also informed that repeat COVID-19 testing for most people is not necessary. Regarding the recovery process, the patient was informed that each person is different and for those with mild presentation of COVID-19 may recover within 2 to 3 weeks of infection while there is with moderate and more severe cases of COVID-19 may take well over 3 weeks or even months to recover. The patient was encouraged to follow-up as directed with their PCP. Need for influenza vaccination  -     INFLUENZA, QUADV, 3 YRS AND OLDER, IM, MDV, 0.5ML (Doreen Galaviz)       I reviewed the plan of care with the patient. Patient acknowledged understanding and agreed with plan of care overall.     Medications Discontinued During This Encounter   Medication Reason    albuterol sulfate HFA (VENTOLIN HFA) 108 (90 Base) MCG/ACT inhaler LIST CLEANUP    aspirin (ASPIRIN CHILDRENS) 81 MG chewable tablet LIST CLEANUP    dicyclomine (BENTYL) 10 MG capsule LIST CLEANUP    Halobetasol Prop-Tazarotene (DUOBRII) 0.01-0.045 % LOTN LIST CLEANUP    ondansetron (ZOFRAN ODT) 4 MG disintegrating tablet LIST CLEANUP        Patient was informed that whether starting or adding a new medication or increasing the dose of a current medication, the benefits and risks have to be considered and weighed over. Patient was also informed that there are no medications that have no side effects and if a side effect were to occur with starting a new medication or with increasing the dose of a current medication that either the medication can be totally discontinued altogether or simply decrease the dose of it and based on this, a follow-up appointment is necessary. The drug allergy list will then be updated with the corresponding side effects if it's deemed to be a true 'drug allergy'. The most common adverse effects of medication(s) were addressed at today's visit. Lastly, the patient was informed that the coverage status of a medication may vary from insurance to insurance and the only way to verify if the medication is covered is to send an actual prescription in. The patient was also informed that the cost of medications vary from insurance to insurance. I reviewed the plan of care with the patient. Patient acknowledged understanding and agreed with plan of care overall. Estimated length of time of today's visit: 30 minutes    PDMP Monitoring:   Last PDMP Michelet as Reviewed [facility]):  Review User Review Instant Review Result   [address] CHRISTIAN 10/8/2020  6:17 PM Reviewed PDMP [1]     Follow-up: Return in about 19 weeks (around 2/18/2021) for IP - smoking, depression. . Patient was informed that if his or her symptoms worsen to return here or go to nearest ER if symptoms significantly worsen. Raghu Aviles [address]    Electronically signed by Evie Aguilar MD on 10/8/2020 at 6:18 PM.

## 2020-10-08 NOTE — LETTER
[facility]  [id number] [facility].  LAST. [address]  Phone: [phone]  Fax: [phone]    Ayesha Palencia MD        October 8, 2020     Patient: Greg Ramirez   YOB: 1985   Date of Visit: 10/8/2020       To Whom it May Concern:    Ce Salinas was seen in my clinic on 10/8/2020. She may return to work on 10/8/20. If you have any questions or concerns, please don't hesitate to call.     Sincerely,         Ayesha Palencia MD

## 2020-10-08 NOTE — PROGRESS NOTES
Vaccine Information Sheet, \"Influenza - Inactivated\"  given to [employer], or parent/legal guardian of  [employer] and verbalized understanding. Patient responses:    Have you ever had a reaction to a flu vaccine? NO  Do you have any current illness? NO  Have you ever had Guillian Philip Syndrome? NO  Do you have a serious allergy to any of the follow: Neomycin, Polymyxin, Thimerosal, eggs or egg products? NO    Flu vaccine given per order. Please see immunization tab. Risks and benefits explained. Current VIS given.

## 2021-02-18 NOTE — TELEPHONE ENCOUNTER
Please have her schedule an appointment. She was advised to F/U in 3 months. LOV was in Oct 2020. Raghu Puga

## 2021-02-26 ENCOUNTER — TELEPHONE (OUTPATIENT)
Dept: FAMILY MEDICINE CLINIC | Age: 36
End: 2021-02-26

## 2021-02-26 NOTE — TELEPHONE ENCOUNTER
----- Message from You[employer] sent at 2021 11:01 AM EST -----  Subject: Appointment Request    Reason for Call: New Patient Request Appointment    QUESTIONS  Type of Appointment? New Patient/New to Provider  Reason for appointment request? No appointments available during search  Additional Information for Provider? Requesting to be seen as a new pt   here   pls advise if availability opens up  ---------------------------------------------------------------------------  --------------  [address]  What is the best way for the office to contact you? OK to leave message on   voicemail  Preferred Call Back Phone Number? [phone]  ---------------------------------------------------------------------------  --------------  SCRIPT ANSWERS  Relationship to Patient? Self  Appointment reason? Establish Care/Find a provider  Is this the first appointment to establish care for a ? No  Have you been diagnosed with   tested for   or told that you are suspected of having COVID-19 (Coronavirus)? No  Have you had a fever or taken medication to treat a fever within the past   3 days? No  Have you had a cough   shortness of breath or flu-like symptoms within the past 3 days? No  Do you currently have flu-like symptoms including fever or chills   cough   shortness of breath   or difficulty breathing   or new loss of taste or smell? No  (Service Expert  click yes below to proceed with [employer] As Usual   Scheduling)?  Yes

## 2021-03-01 ENCOUNTER — VIRTUAL VISIT (OUTPATIENT)
Dept: FAMILY MEDICINE CLINIC | Age: 36
End: 2021-03-01
Payer: COMMERCIAL

## 2021-03-01 DIAGNOSIS — F32.A ANXIETY AND DEPRESSION: ICD-10-CM

## 2021-03-01 DIAGNOSIS — L40.0 PLAQUE PSORIASIS: ICD-10-CM

## 2021-03-01 DIAGNOSIS — E55.9 VITAMIN D DEFICIENCY: Primary | ICD-10-CM

## 2021-03-01 DIAGNOSIS — F41.9 ANXIETY AND DEPRESSION: ICD-10-CM

## 2021-03-01 DIAGNOSIS — E66.01 MORBID OBESITY WITH BODY MASS INDEX (BMI) OF 45.0 TO 49.9 IN ADULT (HCC): ICD-10-CM

## 2021-03-01 PROCEDURE — 99214 OFFICE O/P EST MOD 30 MIN: CPT | Performed by: STUDENT IN AN ORGANIZED HEALTH CARE EDUCATION/TRAINING PROGRAM

## 2021-03-01 RX ORDER — FLUOXETINE HYDROCHLORIDE 40 MG/1
40 CAPSULE ORAL DAILY
Qty: 90 CAPSULE | Refills: 3 | Status: SHIPPED | OUTPATIENT
Start: 2021-03-01 | End: 2022-03-25

## 2021-03-01 NOTE — PROGRESS NOTES
Temperature 98.2        Physical Exam    [INSTRUCTIONS:  \"[x]\" Indicates a positive item  \"[]\" Indicates a negative item  -- DELETE ALL ITEMS NOT EXAMINED]    Constitutional: [x] Appears well-developed and well-nourished [x] No apparent distress      [] Abnormal -     Mental status: [x] Alert and awake  [x] Oriented to person/place/time [x] Able to follow commands    [] Abnormal -     Eyes:   EOM    [x]  Normal    [] Abnormal -   Sclera  [x]  Normal    [] Abnormal -          Discharge [x]  None visible   [] Abnormal -     HENT: [x] Normocephalic, atraumatic  [] Abnormal -   [x] Mouth/Throat: Mucous membranes are moist    External Ears [x] Normal  [] Abnormal -    Neck: [x] No visualized mass [] Abnormal -     Pulmonary/Chest: [x] Respiratory effort normal   [x] No visualized signs of difficulty breathing or respiratory distress        [] Abnormal -      Musculoskeletal:   [x] Normal gait with no signs of ataxia         [x] Normal range of motion of neck        [] Abnormal -     Neurological:        [x] No Facial Asymmetry (Cranial nerve 7 motor function) (limited exam due to video visit)          [x] No gaze palsy        [] Abnormal -          Skin:        [x] No significant exanthematous lesions or discoloration noted on facial skin         [] Abnormal -            Psychiatric:       [x] Normal Affect [] Abnormal -        [x] No Hallucinations    Other pertinent observable physical exam findings:-                Janay Fong, was evaluated through a synchronous (real-time) audio-video encounter. The patient (or guardian if applicable) is aware that this is a billable service. Verbal consent to proceed has been obtained within the past 12 months. The visit was conducted pursuant to the emergency declaration under the [facility], [address] [place] [address] authority and the [employer] General Act.   Patient identification was verified, and a caregiver was present when appropriate. The patient was located in a state where the provider was credentialed to provide care. An electronic signature was used to authenticate this note.     --Yahaira Gayle, DO

## 2021-03-19 ENCOUNTER — TELEPHONE (OUTPATIENT)
Dept: BARIATRICS/WEIGHT MGMT | Age: 36
End: 2021-03-19

## 2021-03-19 NOTE — TELEPHONE ENCOUNTER
Patient was sent Dr Alina Galindo digital bariatric seminar     She DOES have BWLS coverage with [employer] (No specified diet) BMI FORM scanned in media    *Spoke with pt, info given. Pt verbalized understanding. Appt sched. Np pk mailed.    Per pt: No history of wgt loss sx, BMI > 40

## 2021-04-29 ENCOUNTER — OFFICE VISIT (OUTPATIENT)
Dept: BARIATRICS/WEIGHT MGMT | Age: 36
End: 2021-04-29
Payer: COMMERCIAL

## 2021-04-29 VITALS
HEART RATE: 96 BPM | OXYGEN SATURATION: 96 % | WEIGHT: 293 LBS | HEIGHT: 66 IN | SYSTOLIC BLOOD PRESSURE: 114 MMHG | DIASTOLIC BLOOD PRESSURE: 65 MMHG | BODY MASS INDEX: 47.09 KG/M2 | RESPIRATION RATE: 18 BRPM

## 2021-04-29 DIAGNOSIS — K21.9 CHRONIC GERD: ICD-10-CM

## 2021-04-29 DIAGNOSIS — F33.42 RECURRENT MAJOR DEPRESSIVE DISORDER, IN FULL REMISSION (HCC): ICD-10-CM

## 2021-04-29 DIAGNOSIS — E66.01 MORBID OBESITY WITH BMI OF 45.0-49.9, ADULT (HCC): Primary | ICD-10-CM

## 2021-04-29 DIAGNOSIS — R06.83 SNORING: ICD-10-CM

## 2021-04-29 DIAGNOSIS — Z01.818 PREOPERATIVE CLEARANCE: ICD-10-CM

## 2021-04-29 PROCEDURE — 99205 OFFICE O/P NEW HI 60 MIN: CPT | Performed by: SURGERY

## 2021-04-29 RX ORDER — APREMILAST 30 MG/1
30 TABLET, FILM COATED ORAL 2 TIMES DAILY
COMMUNITY

## 2021-04-29 SDOH — HEALTH STABILITY: MENTAL HEALTH: HOW MANY STANDARD DRINKS CONTAINING ALCOHOL DO YOU HAVE ON A TYPICAL DAY?: NOT ASKED

## 2021-04-29 NOTE — PROGRESS NOTES
[facility]) Physicians   [facility]  Garcia Pisano MD, [facility], [address] [place] [address]    [facility] [address] . Phone: [phone]  Fax: [phone]       Chief Complaint   Patient presents with    Bariatric, Initial Visit     NP, WLS, [employer]BS           HPI:    Roni Ojeda is a very pleasant 30 y.o. obese female ,   Body mass index is 49.71 kg/m². And multiple medical problems who is presenting for weight loss surgery evaluation and consultation by Dr. Pee Dwyer. Patient has been struggling for several years now with obesity. Patient feels the weight is an obstacle to achieve and perform things in daily living as well risk on health. Tries to diet, and exercise but can't keep the weight off. Patient tried calorie restriction, nutritional counseling with RD. Patient has not participated in meal replacement/liquid diets. Patient has participated in weight loss medications - adipex for 1 month () and other regimens, but with no sustainable weight loss. Patient  is very determined to lose weight and be healthy, and is interested in surgical weight loss for future weight loss. .    Otherwise patient denies any nausea, vomiting, fevers, chills, shortness of breath, chest pain, constipation or urinary symptoms.         Obesity related problems Rufina Palmer is dealing with:  Patient Active Problem List   Diagnosis    Recurrent major depressive disorder, in full remission ([facility]    Morbid obesity with BMI of 45.0-49.9, adult ([facility]    Preoperative clearance    Chronic GERD    Snoring           Pain Assessment   Denies any abdominal pain     Past Medical History:   Diagnosis Date    Cataract     Depression     Genital herpes     one outbreak in     Kidney stone     Lung nodule     Pityriasis versicolor     Psoriasis      Past Surgical History:   Procedure Laterality Date    EYE SURGERY      cataract Rt eye    INDUCED       KIDNEY STONE SURGERY Family History   Problem Relation Age of Onset    Stroke Father 62        1    Diabetes Sister 23        type 1     Diabetes Paternal Grandmother      Social History     Tobacco Use    Smoking status: Former Smoker     Packs/day: 1.00     Years: 16.00     Pack years: 16.00     Types: Cigarettes     Quit date: 10/2020     Years since quittin.5    Smokeless tobacco: Never Used   Substance Use Topics    Alcohol use: Not Currently     Frequency: Monthly or less     Comment: very rare     I counseled the patient on the importance of not smoking and risks of ETOH. Allergies   Allergen Reactions    Penicillins Nausea And Vomiting     Vitals:    21 0950   BP: 114/65   Pulse: 96   Resp: 18   SpO2: 96%   Weight: (!) 308 lb (139.7 kg)   Height: 5' 6\" (1.676 m)       Body mass index is 49.71 kg/m². Current Outpatient Medications:     Apremilast (OTEZLA) 30 MG TABS, Take by mouth, Disp: , Rfl:     FLUoxetine (PROZAC) 40 MG capsule, Take 1 capsule by mouth daily, Disp: 90 capsule, Rfl: 3    Multiple Vitamins-Minerals (THERAPEUTIC MULTIVITAMIN-MINERALS) tablet, Take 1 tablet by mouth daily, Disp: , Rfl:     BIOTIN PO, Take by mouth, Disp: , Rfl:     Cholecalciferol (VITAMIN D3) 50 MCG (2000 UT) CAPS, Take by mouth, Disp: , Rfl:     Melatonin 5 MG CAPS, Take by mouth nightly , Disp: , Rfl:       Review of Systems - History obtained from the patient  General ROS: overweight   Psychological ROS: negative  Ophthalmic ROS: negative  Neurological ROS: negative  ENT ROS: negative  Allergy and Immunology ROS: negative  Hematological and Lymphatic ROS: negative  Endocrine ROS: overweight  Breast ROS: negative  Respiratory ROS: negative  Cardiovascular ROS: negative  Gastrointestinal ROS:negative  Genito-Urinary ROS: negative  Musculoskeletal ROS: weight effects on joints  Skin ROS: negative    Physical Exam   Constitutional: Patient is oriented to person, place, and time.  Vital signs are normal. Patient appears well-developed and well-nourished. Patient  is active and cooperative. Non-toxic appearance. No distress. HENT:   Head: Normocephalic and atraumatic. Head is without laceration. Right Ear: External ear normal. No lacerations. No drainage, swelling or tenderness. Left Ear: External ear normal. No lacerations. No drainage, swelling or tenderness. Nose/Mouth/Throat: Patient is wearing mask due to Covid-19 pandemic precautions, following CDC and health authorities guidelines. Eyes: Conjunctivae, EOM and lids are normal. Pupils are equal, round, and reactive to light. Right eye exhibits no discharge. No foreign body present in the right eye. Left eye exhibits no discharge. No foreign body present in the left eye. No scleral icterus. Neck: Trachea normal and normal range of motion. Neck supple. No JVD present. No tracheal tenderness present. Carotid bruit is not present. No rigidity. No tracheal deviation and no edema present. No thyromegaly present. Cardiovascular: Normal rate, regular rhythm, normal heart sounds, intact distal pulses and normal pulses. Pulmonary/Chest: Effort normal and breath sounds normal. No stridor. No respiratory distress. Patient  has no wheezes. Patient has no rales. Patient exhibits no tenderness and no crepitus. Abdominal: Soft. Normal appearance and bowel sounds are normal. Patient exhibits no distension, no abdominal bruit, no ascites and no mass. There is no hepatosplenomegaly. There is no tenderness. There is no rigidity, no rebound, no guarding and no CVA tenderness. No hernia. Hernia confirmed negative in the ventral area. Musculoskeletal: Normal range of motion. Patient exhibits no edema or tenderness. Lymphadenopathy:        Head (right side): No submental, no submandibular, no preauricular, no posterior auricular and no occipital adenopathy present.         Head (left side): No submental, no submandibular, no preauricular, no posterior auricular and no occipital adenopathy present. Patient  has no cervical adenopathy. Right: No supraclavicular adenopathy present. Left: No supraclavicular adenopathy present. Neurological: Patient is alert and oriented to person, place, and time. Patient has normal strength. Coordination and gait normal. GCS eye subscore is 4. GCS verbal subscore is 5. GCS motor subscore is 6. Skin: Skin is warm and dry. No abrasion and no rash noted. Patient  is not diaphoretic. No cyanosis or erythema. Psychiatric: Patient has a normal mood and affect. speech is normal and behavior is normal. Cognition and memory are normal.         Haylee Montero was seen today for bariatric, initial visit. Diagnoses and all orders for this visit:    Morbid obesity with BMI of 45.0-49.9, adult ([facility]  -     CBC Auto Differential; Future  -     Comprehensive Metabolic Panel; Future  -     Hemoglobin A1C; Future  -     Iron and TIBC; Future  -     Lipid Panel; Future  -     TSH with Reflex; Future  -     Vitamin A; Future  -     Vitamin B1, Whole Blood; Future  -     Vitamin B12 & Folate; Future  -     Vitamin D 25 Hydroxy; Future  -     Vitamin E; Future  -     Protime-INR; Future  -     Ambulatory referral to Cardiology  -     Ambulatory referral to Sleep Medicine    Preoperative clearance  -     CBC Auto Differential; Future  -     Comprehensive Metabolic Panel; Future  -     Hemoglobin A1C; Future  -     Iron and TIBC; Future  -     Lipid Panel; Future  -     TSH with Reflex; Future  -     Vitamin A; Future  -     Vitamin B1, Whole Blood; Future  -     Vitamin B12 & Folate; Future  -     Vitamin D 25 Hydroxy; Future  -     Vitamin E; Future  -     Protime-INR; Future  -     Ambulatory referral to Cardiology  -     Ambulatory referral to Sleep Medicine    Recurrent major depressive disorder, in full remission ([facility]  -     CBC Auto Differential; Future  -     Comprehensive Metabolic Panel;  Future  -     Hemoglobin A1C; Future  -     Iron and TIBC; Future  -     Lipid Panel; Future  -     TSH with Reflex; Future  -     Vitamin A; Future  -     Vitamin B1, Whole Blood; Future  -     Vitamin B12 & Folate; Future  -     Vitamin D 25 Hydroxy; Future  -     Vitamin E; Future  -     Protime-INR; Future  -     Ambulatory referral to Cardiology  -     Ambulatory referral to Sleep Medicine    Snoring  -     CBC Auto Differential; Future  -     Comprehensive Metabolic Panel; Future  -     Hemoglobin A1C; Future  -     Iron and TIBC; Future  -     Lipid Panel; Future  -     TSH with Reflex; Future  -     Vitamin A; Future  -     Vitamin B1, Whole Blood; Future  -     Vitamin B12 & Folate; Future  -     Vitamin D 25 Hydroxy; Future  -     Vitamin E; Future  -     Protime-INR; Future  -     Ambulatory referral to Cardiology  -     Ambulatory referral to Sleep Medicine    Chronic GERD  -     CBC Auto Differential; Future  -     Comprehensive Metabolic Panel; Future  -     Hemoglobin A1C; Future  -     Iron and TIBC; Future  -     Lipid Panel; Future  -     TSH with Reflex; Future  -     Vitamin A; Future  -     Vitamin B1, Whole Blood; Future  -     Vitamin B12 & Folate; Future  -     Vitamin D 25 Hydroxy; Future  -     Vitamin E; Future  -     Protime-INR; Future  -     Ambulatory referral to Cardiology  -     Ambulatory referral to Sleep Medicine          Kaylee Blanco is a very pleasant 30 y.o. female with Obesity,  Body mass index is 49.71 kg/m². and multiple obesity related co-morbidities. Maria G Rosales is very motivated to lose weight and being more healthy. We discussed how her weight affects her overall health including:  Patient Active Problem List   Diagnosis    Recurrent major depressive disorder, in full remission ([facility]    Morbid obesity with BMI of 45.0-49.9, adult ([facility]    Preoperative clearance    Chronic GERD    Snoring      The patient underwent extensive dietary counseling.   I have reviewed, discussed and agree with the dietary plan.  Medical weight loss and different surgical options were discussed in details with patient. Stephanie is interested in surgical weight loss for future weight loss. Patient is interested in Laparoscopic Sleeve Gastrectomy, which I believe is an excellent option. We will proceed with pre-operative work up labs and studies. Will also petition patient's  insurance for approval for this procedure. I advised the patient that we can't guarantee final insurance approval.    Patient received dietary handouts and education. Patient advised that its their responsibility to follow up for studies, referrals and/or labs ordered today. Also discussed in details the importance of follow up, as well following the recommendations and completing the whole program to improve outcomes when it comes to healthier lifestyle as well weight loss. Patient also advised about risks and benefits being on a strict dietary regimen as well using supplements. Patient agrees and wants to proceed with weight loss planning     Obesity as a disease is considered a high risk to patients overall health and should therefore be considered a high risk disease state. Now with Covid-19 pandemic, CDC and health authorities does classify obese patients as vulnerable and high risk as well. Which makes weight loss a priority for improvement of their wellbeing and overall health. CDC has issued the following statement as far Obese patients being at Increased Risk of being critically ill from SARS-Cov-2  \"Severe obesity increases the risk of a serious breathing problem called acute respiratory distress syndrome (ARDS), which is a major complication of FVJXT-85 and can cause difficulties with a doctors ability to provide respiratory support for seriously ill patients. People living with severe obesity can have multiple serious chronic diseases and underlying health conditions that can increase the risk of severe illness from COVID-19. \"

## 2021-04-29 NOTE — PROGRESS NOTES
confident in her ability to make these changes. The patient's expectations of post-surgical eating habits realistic. Patient states she does understand the consequences of not complying with post-op food guidelines. Patient states she does understands the long term changes in food intake that will be necessary for all occasions after surgery for the rest of her life. Patient is deemed nutritionally appropriate to proceed. Goals  Weight: 175 - 195 lbs   Health Improvement: wants to improve fertility, increase self-image, increase ADL's    Assessment  Nutritional Needs: RMR=(9.99 x 139.7) + (6.25 x 167.6) - (4.92 x 35 y.o.) -161= 2115 kcal x 1.4 (sedentary activity factor)= 2961 kcal - 1000 (for 2 lb weight loss/week)= 1961 kcal.    Plan  Plan/Recommendations: Start presurgical guidelines. Goals:   -Eat 4-5 times daily  -Avoid high fat and high sugar foods  -Include protein with all meals and snacks  -Avoid carbonation and caffeine  -Avoid calorie containing beverages  -Increase physical activity as tolerated    PES Statement:  Overweight/Obesity related to lack of exercise, sedentary lifestyle, unhealthy eating habits, and unsuccessful diet attempts as evidenced by BMI. Body mass index is 49.71 kg/m². Will follow up as necessary.     Marilee Carroll

## 2021-04-29 NOTE — PATIENT INSTRUCTIONS
Patient received dietary handouts and education. Pre-operative work up Ordered:    - [employer]. - Psych Evaluation.   - Cardiac Clearance. - Sleep Study & CPAP Compliance. - EGD (endoscopy to check your stomach). - Support Group Attendance. - Obtain letter of medical necessity (PCP Letter). - Quit Smoking,  Alcohol, Caffeine and Carbonated Drinks  - Obtain records for Weight History 2 yrs. - Start Regular Exercise and track your activities. - Start Tracking your food Intake and follow dietary guidelines. - Avoid Pregnancy for 2 yrs from date of surgery. (for female patients in childbearing age)  - F/U in 4 weeks. - F/U with Behaviorist.           Patient advised that its their responsibility to follow up for studies, referrals and/or labs ordered today.

## 2021-04-30 ENCOUNTER — TELEPHONE (OUTPATIENT)
Dept: FAMILY MEDICINE CLINIC | Age: 36
End: 2021-04-30

## 2021-04-30 NOTE — LETTER
[address]  [address] [address]  Phone: [phone]  Fax: [phone]    Kathleen Quevedo DO        April 30, 2021    Cesarcristina Arenas Ajit  [address] [place] [address]  [facility] [facility]      Felice Garcia MD  [facility] ([facility]  [address], [facility], [address]    RE: John Lima is a 30 y.o. female and was seen for a new patient appointment on 3/01/2021, but has seen other [facility] Primary care for 4 years; she suffers from the following co-morbidities. Patient Active Problem List   Diagnosis    Recurrent major depressive disorder, in full remission ([address]    Morbid obesity with BMI of 45.0-49.9, adult ([facility]    Preoperative clearance    Chronic GERD    Snoring       her current BMI is There is no height or weight on file to calculate BMI. .  The patient has made multiple weight loss attempts including calorie counting, and seen a nutritionist.  Despite her efforts she has failed to maintain any sustained weight loss. Previous Weights Include   YEAR   WEIGHT     2018       242 lb     2019       269 lb     2020       301 lb           2021       300 lb                                    I feel this patient would benefit from weight loss surgery because of her struggles with maintaining a healthy weight. Also, her medical conditions will become life-threatening if she does not get help getting her weight under control. I appreciate your consideration for approval.  Please feel free to contact me for any further information.       Sincerely,          Kathleen Quevedo DO

## 2021-04-30 NOTE — TELEPHONE ENCOUNTER
----- Message from Teri Mayes sent at 4/29/2021  5:11 PM EDT -----  Subject: Referral Request    QUESTIONS   Reason for referral request? patient need bloodwork for the weight loss   surgery that she was referred to   Has the physician seen you for this condition before? No   Preferred Specialist (if applicable)? Kerry Evans  Do you already have an appointment scheduled? No  Additional Information for Provider? patient need referral letter filled   out in order to get surgery  ---------------------------------------------------------------------------  --------------  [address]  What is the best way for the office to contact you? OK to leave message on   voicemail  Preferred Call Back Phone Number?  [phone]

## 2021-04-30 NOTE — TELEPHONE ENCOUNTER
She is need a letter for Veda Gardner. I have a pended the letter, please review and add anything else that Is needed.

## 2021-05-05 ENCOUNTER — OFFICE VISIT (OUTPATIENT)
Dept: CARDIOLOGY CLINIC | Age: 36
End: 2021-05-05
Payer: COMMERCIAL

## 2021-05-05 ENCOUNTER — HOSPITAL ENCOUNTER (OUTPATIENT)
Age: 36
Discharge: HOME | End: 2021-05-05
Payer: COMMERCIAL

## 2021-05-05 VITALS
HEIGHT: 66 IN | HEART RATE: 93 BPM | BODY MASS INDEX: 47.09 KG/M2 | OXYGEN SATURATION: 97 % | DIASTOLIC BLOOD PRESSURE: 70 MMHG | WEIGHT: 293 LBS | SYSTOLIC BLOOD PRESSURE: 122 MMHG

## 2021-05-05 DIAGNOSIS — K21.9 GASTROESOPHAGEAL REFLUX DISEASE WITHOUT ESOPHAGITIS: ICD-10-CM

## 2021-05-05 DIAGNOSIS — R00.0 SINUS TACHYCARDIA: ICD-10-CM

## 2021-05-05 DIAGNOSIS — Z01.818 PRE-OPERATIVE EXAMINATION: Primary | ICD-10-CM

## 2021-05-05 DIAGNOSIS — Z01.818 PREOPERATIVE CLEARANCE: ICD-10-CM

## 2021-05-05 DIAGNOSIS — K21.9 CHRONIC GERD: ICD-10-CM

## 2021-05-05 DIAGNOSIS — E66.01 MORBID OBESITY WITH BMI OF 45.0-49.9, ADULT (HCC): ICD-10-CM

## 2021-05-05 DIAGNOSIS — Z87.891 FORMER TOBACCO USE: ICD-10-CM

## 2021-05-05 DIAGNOSIS — F33.42 RECURRENT MAJOR DEPRESSIVE DISORDER, IN FULL REMISSION (HCC): ICD-10-CM

## 2021-05-05 DIAGNOSIS — R91.1 PULMONARY NODULE: ICD-10-CM

## 2021-05-05 DIAGNOSIS — N20.0 NEPHROLITHIASIS: ICD-10-CM

## 2021-05-05 DIAGNOSIS — N92.6 LATE PERIOD: ICD-10-CM

## 2021-05-05 DIAGNOSIS — F12.90 MARIJUANA USE: ICD-10-CM

## 2021-05-05 DIAGNOSIS — R06.83 SNORING: ICD-10-CM

## 2021-05-05 LAB
A/G RATIO: 1.3 (ref 1.1–2.2)
ALBUMIN SERPL-MCNC: 4.2 G/DL (ref 3.4–5)
ALP BLD-CCNC: 94 U/L (ref 40–129)
ALT SERPL-CCNC: 25 U/L (ref 10–40)
ANION GAP SERPL CALCULATED.3IONS-SCNC: 15 MMOL/L (ref 3–16)
AST SERPL-CCNC: 21 U/L (ref 15–37)
BASOPHILS ABSOLUTE: 0 K/UL (ref 0–0.2)
BASOPHILS RELATIVE PERCENT: 0.3 %
BILIRUB SERPL-MCNC: 0.8 MG/DL (ref 0–1)
BUN BLDV-MCNC: 11 MG/DL (ref 7–20)
CALCIUM SERPL-MCNC: 9.2 MG/DL (ref 8.3–10.6)
CHLORIDE BLD-SCNC: 102 MMOL/L (ref 99–110)
CHOLESTEROL, TOTAL: 192 MG/DL (ref 0–199)
CO2: 20 MMOL/L (ref 21–32)
CREAT SERPL-MCNC: 0.7 MG/DL (ref 0.6–1.1)
EOSINOPHILS ABSOLUTE: 0.2 K/UL (ref 0–0.6)
EOSINOPHILS RELATIVE PERCENT: 1.6 %
FOLATE: 17.23 NG/ML (ref 4.78–24.2)
GFR AFRICAN AMERICAN: >60
GFR NON-AFRICAN AMERICAN: >60
GLOBULIN: 3.2 G/DL
GLUCOSE BLD-MCNC: 93 MG/DL (ref 70–99)
HCG(URINE) PREGNANCY TEST: NEGATIVE
HCT VFR BLD CALC: 39.9 % (ref 36–48)
HDLC SERPL-MCNC: 35 MG/DL (ref 40–60)
HEMOGLOBIN: 13.6 G/DL (ref 12–16)
INR BLD: 1.03 (ref 0.86–1.14)
IRON SATURATION: 21 % (ref 15–50)
IRON: 69 UG/DL (ref 37–145)
LDL CHOLESTEROL CALCULATED: 108 MG/DL
LYMPHOCYTES ABSOLUTE: 2.2 K/UL (ref 1–5.1)
LYMPHOCYTES RELATIVE PERCENT: 20.8 %
MCH RBC QN AUTO: 31.9 PG (ref 26–34)
MCHC RBC AUTO-ENTMCNC: 34.2 G/DL (ref 31–36)
MCV RBC AUTO: 93.3 FL (ref 80–100)
MONOCYTES ABSOLUTE: 0.7 K/UL (ref 0–1.3)
MONOCYTES RELATIVE PERCENT: 7 %
NEUTROPHILS ABSOLUTE: 7.3 K/UL (ref 1.7–7.7)
NEUTROPHILS RELATIVE PERCENT: 70.3 %
PDW BLD-RTO: 15.9 % (ref 12.4–15.4)
PLATELET # BLD: 300 K/UL (ref 135–450)
PMV BLD AUTO: 9.4 FL (ref 5–10.5)
POTASSIUM SERPL-SCNC: 4 MMOL/L (ref 3.5–5.1)
PROTHROMBIN TIME: 12 SEC (ref 10–13.2)
RBC # BLD: 4.28 M/UL (ref 4–5.2)
SODIUM BLD-SCNC: 137 MMOL/L (ref 136–145)
TOTAL IRON BINDING CAPACITY: 331 UG/DL (ref 260–445)
TOTAL PROTEIN: 7.4 G/DL (ref 6.4–8.2)
TRIGL SERPL-MCNC: 247 MG/DL (ref 0–150)
TSH REFLEX: 2.15 UIU/ML (ref 0.27–4.2)
VITAMIN B-12: 445 PG/ML (ref 211–911)
VITAMIN D 25-HYDROXY: 23.9 NG/ML
VLDLC SERPL CALC-MCNC: 49 MG/DL
WBC # BLD: 10.5 K/UL (ref 4–11)

## 2021-05-05 PROCEDURE — 82306 VITAMIN D 25 HYDROXY: CPT

## 2021-05-05 PROCEDURE — 82607 VITAMIN B-12: CPT

## 2021-05-05 PROCEDURE — 99244 OFF/OP CNSLTJ NEW/EST MOD 40: CPT | Performed by: INTERNAL MEDICINE

## 2021-05-05 PROCEDURE — 83550 IRON BINDING TEST: CPT

## 2021-05-05 PROCEDURE — 84446 ASSAY OF VITAMIN E: CPT

## 2021-05-05 PROCEDURE — 80053 COMPREHEN METABOLIC PANEL: CPT

## 2021-05-05 PROCEDURE — 84590 ASSAY OF VITAMIN A: CPT

## 2021-05-05 PROCEDURE — 83540 ASSAY OF IRON: CPT

## 2021-05-05 PROCEDURE — 80061 LIPID PANEL: CPT

## 2021-05-05 PROCEDURE — 85025 COMPLETE CBC W/AUTO DIFF WBC: CPT

## 2021-05-05 PROCEDURE — 82746 ASSAY OF FOLIC ACID SERUM: CPT

## 2021-05-05 PROCEDURE — 84443 ASSAY THYROID STIM HORMONE: CPT

## 2021-05-05 PROCEDURE — 84703 CHORIONIC GONADOTROPIN ASSAY: CPT

## 2021-05-05 PROCEDURE — 93000 ELECTROCARDIOGRAM COMPLETE: CPT | Performed by: INTERNAL MEDICINE

## 2021-05-05 PROCEDURE — 85610 PROTHROMBIN TIME: CPT

## 2021-05-05 PROCEDURE — 84425 ASSAY OF VITAMIN B-1: CPT

## 2021-05-05 PROCEDURE — 36415 COLL VENOUS BLD VENIPUNCTURE: CPT

## 2021-05-05 PROCEDURE — 83036 HEMOGLOBIN GLYCOSYLATED A1C: CPT

## 2021-05-05 ASSESSMENT — ENCOUNTER SYMPTOMS
NAUSEA: 0
CONSTIPATION: 0
VOMITING: 0
PHOTOPHOBIA: 0
ABDOMINAL PAIN: 0
DIARRHEA: 0
RHINORRHEA: 0
BLOOD IN STOOL: 0
SORE THROAT: 0
SHORTNESS OF BREATH: 0
COUGH: 0
EYE PAIN: 0

## 2021-05-05 NOTE — PROGRESS NOTES
[address] [facility]   Cardiovascular Evaluation    PATIENT: Josr Carrion  DATE: 2021  MRN: [id number]  CSN: [id number]  : 1985      Primary Care Doctor: Sue Cornejo DO  Reason for evaluation:   Pre-operative cardiac risk assessment    Subjective:   History of present illness on initial date of evaluation:  Josr Carrion is a 78-year-old female with history of morbid obesity, nephrolithiasis, GERD, and previous tobacco use referred for a pre-operative cardiac risk assessment prior to planned laparoscopic sleeve gastrectomy. The patient reports that she has generally been doing well and has been working to hard to follow the diet prescribed by the bariatric surgery clinic. She has lost 3 pounds since starting her new diet. She walks multiple laps around her work building which she says is approximately a quarter mile in distance for 20-30 minutes 5 days per week and tolerates this well. She denies any chest pain, shortness of breath, lightheadedness, dizziness, palpitations,  orthopnea, or paroxysmal nocturnal dyspnea. She does note occasional mild lower extremity edema. She has no prior history of heart disease, stroke, renal insufficiency, or diabetes. She previously smoked 3/4 packs per day for 16 years and quit in 2020. She currently smokes marijuana daily. She denies heavy alcohol use. She additionally reports that her period is currently a month late and says that her periods have typically been regular. She has taken 3 pregnancy tests, which were all negative. Patient Active Problem List   Diagnosis    Recurrent major depressive disorder, in full remission ([facility]    Morbid obesity with BMI of 45.0-49.9, adult ([facility]    Preoperative clearance    Chronic GERD    Snoring         Past Medical History:   has a past medical history of Cataract, Depression, Genital herpes, Kidney stone, Lung nodule, Pityriasis versicolor, and Psoriasis. Surgical History:   has a past surgical history that includes eye surgery; Induced ; and Kidney stone surgery. Social History:   reports that she quit smoking about 7 months ago. Her smoking use included cigarettes. She has a 16.00 pack-year smoking history. She has never used smokeless tobacco. She reports previous alcohol use. She reports current drug use. Drug: Marijuana. Family History:  No evidence for sudden cardiac death or premature CAD. She reports he father had a massive stroke at the age of 62. She reports DVT runs in her family, but she has not had any issues with this. She reports that her cousin was diagnosed with clotting issues, but she is unsure of her exact diagnosis. Home Medications:  Reviewed and are listed in nursing record. and/or listed below  Current Outpatient Medications   Medication Sig Dispense Refill    Apremilast (OTEZLA) 30 MG TABS Take by mouth      FLUoxetine (PROZAC) 40 MG capsule Take 1 capsule by mouth daily 90 capsule 3    Multiple Vitamins-Minerals (THERAPEUTIC MULTIVITAMIN-MINERALS) tablet Take 1 tablet by mouth daily      BIOTIN PO Take by mouth      Melatonin 5 MG CAPS Take by mouth nightly       Cholecalciferol (VITAMIN D3) 50 MCG (2000) CAPS Take by mouth       No current facility-administered medications for this visit. Allergies:  Penicillins     Review of Systems:   A 14 point review of symptoms completed. Pertinent positives identified in the HPI, all other review of symptoms negative as below. Review of Systems   Constitutional: Negative for chills and fever. HENT: Negative for congestion, rhinorrhea and sore throat. Eyes: Negative for photophobia, pain and visual disturbance. Respiratory: Negative for cough and shortness of breath. Cardiovascular: Positive for leg swelling. Negative for chest pain and palpitations.    Gastrointestinal: Negative for abdominal pain, blood in stool, constipation, diarrhea, nausea and vomiting. Endocrine: Negative for cold intolerance and heat intolerance. Genitourinary: Negative for difficulty urinating, dysuria and hematuria. Musculoskeletal: Negative for arthralgias, joint swelling and myalgias. Skin: Negative for rash and wound. Allergic/Immunologic: Negative for environmental allergies and food allergies. Neurological: Positive for headaches. Negative for dizziness, syncope and light-headedness. Hematological: Negative for adenopathy. Does not bruise/bleed easily. Psychiatric/Behavioral: Negative for dysphoric mood. The patient is not nervous/anxious. Objective:   PHYSICAL EXAM:    Vitals:    05/05/21 1345   BP: 122/70   Pulse: 113   SpO2: 97%    Weight: (!) 305 lb (138.3 kg)     Wt Readings from Last 3 Encounters:   05/05/21 (!) 305 lb (138.3 kg)   04/29/21 (!) 308 lb (139.7 kg)   10/08/20 (!) 301 lb 3.2 oz (136.6 kg)     General: Morbidly obese adult female in no acute distress. Pleasant and interactive on exam.  HEENT: Normocephalic, atraumatic, non-icteric, hearing intact, nares normal, mucous membranes moist.  Neck: Supple, trachea midline. No adenopathy. No thyromegaly. No carotid bruits. No JVD. Heart: Regular rate and rhythm. Normal S1 and S2. No murmurs, gallops or rubs. Lungs: Normal respiratory effort. Clear to auscultation bilaterally. No wheezes, rales, or rhonchi. Abdomen: Soft, non-tender. Normoactive bowel sounds. No masses or organomegaly. Skin: No rashes, wounds, or lesions. Pulses: 2+ and symmetric. Extremities: No clubbing, cyanosis, or edema. Musculoskeletal: 5/5 strength in upper and lower extremities. Psych: Normal mood and affect. Neuro: Alert and oriented to person, place, and time. No focal deficits noted.       LABS   CBC:      Lab Results   Component Value Date    WBC 10.2 08/18/2020    RBC 4.57 08/18/2020    HGB 14.7 08/18/2020    HCT 43.5 08/18/2020    MCV 95.1 08/18/2020    RDW 15.5 08/18/2020     08/18/2020     CMP:  Lab Results   Component Value Date     08/18/2020    K 3.9 08/18/2020    K 3.7 07/25/2019    CL 98 08/18/2020    CO2 22 08/18/2020    BUN 5 08/18/2020    CREATININE 0.7 08/18/2020    GFRAA >60 08/18/2020    AGRATIO 1.2 08/18/2020    LABGLOM >60 08/18/2020    GLUCOSE 145 08/18/2020    PROT 7.6 08/18/2020    CALCIUM 9.1 08/18/2020    BILITOT 1.3 08/18/2020    ALKPHOS 95 08/18/2020    AST 16 08/18/2020    ALT 25 08/18/2020     PT/INR:   No results found for: PTINR  Liver:  No components found for: CHLPL  Lab Results   Component Value Date    ALT 25 08/18/2020    AST 16 08/18/2020    ALKPHOS 95 08/18/2020    BILITOT 1.3 (H) 08/18/2020     Lab Results   Component Value Date    LABA1C 5.2 08/23/2019     Lipids:         Lab Results   Component Value Date    TRIG 115 08/10/2018            Lab Results   Component Value Date    HDL 51 08/10/2018            Lab Results   Component Value Date    LDLCALC 100 (H) 08/10/2018            Lab Results   Component Value Date    LABVLDL 23 08/10/2018         CARDIAC DATA   LAST EKG: Sinus tachycardia,  bpm.    ECHO: N/A    STRESS TEST: N/A    CARDIAC CATH: N/A      Assessment:     1. Pre-operative cardiac risk assessment  2. Sinus tachycardia  3. Morbid obesity, BMI 49.23  4. GERD  5. Nephrolithiasis  6. Pulmonary nodule  7. Previous tobacco use  8. Marijuana use   9. Missed period       Plan:     1. RCRI is 0, patient denies angina, and is able to perform >4 METs. Her baseline ECG is without ischemic changes or evidence of prior infarction. Overall, she is low risk for an adverse perioperative cardiovascular event in the setting of an intermediate risk surgery. 2. No additional cardiac evaluation is currently indicated prior to proceeding with planned laparoscopic sleeve gastrectomy. 3. She was noted to be mildly tachycardic on arrival to clinic, but states that she was running late and hurrying to get to her appointment today.   On repeat assessment, heart rate had decreased to the 90s. If resting heart rate is consistently elevated in the future and there is concern for inappropriate sinus tachycardia, can consider ambulatory monitor for further evaluation. 4. Encouraged patient to continue efforts at pre-operative weight loss and to adhere to diet prescribed by bariatric surgery clinic. 5. Recommended regular physical exercise for at least 30 minutes 3-5 times per week. 6. Will follow-up on battery of labs ordered by bariatric surgery clinic, including CBC, CMP, HbA1c, lipid panel, and TSH. 7. Given recent missed period, will check urine pregnancy test.    8. Agree with referral to pulmonology to evaluate for obstructive sleep apnea. 9. She will need to follow-up with her PCP and/or pulmonology for routine monitoring of her pulmonary nodule. 10. Can follow-up in the future as needed. This note has been scribed in the presence of Henrique Ku DO by [employer], RN. It is a pleasure to assist in the care of Linn Patel. Please call with any questions. The scribes documentation has been prepared under my direction and personally reviewed by me in its entirety. I confirm that the note above accurately reflects all work, treatment, procedures, and medical decision making performed by me. I, Dr. Lilia Romberg, personally performed the services described in this documentation as scribed by ROSHNI Fabian, RN in my presence, and it is both accurate and complete to the best of our ability.          Lilia Romberg, [address] [facility]  [phone] [address] [place] [address]  [phone] [facility]

## 2021-05-05 NOTE — PATIENT INSTRUCTIONS
Patient Plan:  1. Continue diet and exercise regimen pre and post operatively as recommended by Dr. Felicita Denton. 2. Proceed with lab work and testing as ordered by Dr. Felicita Denton. 3. Urine pregnancy test ordered as patient reported her period is 1 month over due.    -if negative and periods remain irregular, it is recommended that you follow up with your OB/GYN. 4. Patient is low cardiac risk for non cardiac procedure. She may proceed with weight loss surgery as planned. 5. Marijuana cessation and continued smoking cessation recommended. 6. Follow up with cardiology PRN. Your provider has ordered lab work for further evaluation. The order/prescription is included in your paper work. You may have your labs completed at any of the [facility] locations includin [facility] Lab associated with the [employer] located on the [employer]. The address is [address] on the first floor. It is the building directly across from the Emergency Room. The main entrance to this building faces [employer] [place]. You will have to drive around the building to locate the main entrance.  You may also use [facility] lab located inside the hospital, [facility] lab located inside the hospital, or [facility] [place] [address] [facility] located off [facility].  You may use any other [facility] [employer] facilities or your [facility] office.  Labs may also be completed at any other facility of your choice, however, please allow 72 hours to receive your labs for review. If you do not receive your lab results 72-96 hours after completing, please call the office.

## 2021-05-06 ENCOUNTER — TELEPHONE (OUTPATIENT)
Dept: CARDIOLOGY CLINIC | Age: 36
End: 2021-05-06

## 2021-05-06 LAB
ESTIMATED AVERAGE GLUCOSE: 114 MG/DL
HBA1C MFR BLD: 5.6 %

## 2021-05-06 NOTE — TELEPHONE ENCOUNTER
----- Message from Laurin Osler, DO sent at 5/5/2021  6:12 PM EDT -----  Please let patient know that her urine pregnancy test was negative. Thanks.

## 2021-05-08 LAB
ALPHA-TOCOPHEROL: 10.5 MG/L (ref 5.5–18)
GAMMA-TOCOPHEROL: 2.7 MG/L (ref 0–6)
RETINYL PALMITATE: 0.03 MG/L (ref 0–0.1)
VITAMIN A LEVEL: 0.58 MG/L (ref 0.3–1.2)
VITAMIN A, INTERP: NORMAL

## 2021-05-10 ENCOUNTER — TELEPHONE (OUTPATIENT)
Dept: PULMONOLOGY | Age: 36
End: 2021-05-10

## 2021-05-10 ENCOUNTER — VIRTUAL VISIT (OUTPATIENT)
Dept: PULMONOLOGY | Age: 36
End: 2021-05-10
Payer: COMMERCIAL

## 2021-05-10 DIAGNOSIS — Z01.818 PREOPERATIVE CLEARANCE: ICD-10-CM

## 2021-05-10 DIAGNOSIS — R91.1 PULMONARY NODULE: ICD-10-CM

## 2021-05-10 DIAGNOSIS — E66.01 MORBID OBESITY (HCC): ICD-10-CM

## 2021-05-10 DIAGNOSIS — G47.30 OBSERVED SLEEP APNEA: ICD-10-CM

## 2021-05-10 DIAGNOSIS — R53.83 OTHER FATIGUE: ICD-10-CM

## 2021-05-10 DIAGNOSIS — R06.83 SNORING: Primary | ICD-10-CM

## 2021-05-10 LAB — VITAMIN B1 WHOLE BLOOD: 153 NMOL/L (ref 70–180)

## 2021-05-10 PROCEDURE — 99204 OFFICE O/P NEW MOD 45 MIN: CPT | Performed by: INTERNAL MEDICINE

## 2021-05-10 ASSESSMENT — SLEEP AND FATIGUE QUESTIONNAIRES
HOW LIKELY ARE YOU TO NOD OFF OR FALL ASLEEP WHILE LYING DOWN TO REST IN THE AFTERNOON WHEN CIRCUMSTANCES PERMIT: 3
HOW LIKELY ARE YOU TO NOD OFF OR FALL ASLEEP WHILE SITTING AND READING: 1
HOW LIKELY ARE YOU TO NOD OFF OR FALL ASLEEP WHILE SITTING QUIETLY AFTER LUNCH WITHOUT ALCOHOL: 1
HOW LIKELY ARE YOU TO NOD OFF OR FALL ASLEEP WHILE SITTING INACTIVE IN A PUBLIC PLACE: 1
HOW LIKELY ARE YOU TO NOD OFF OR FALL ASLEEP WHILE WATCHING TV: 1
ESS TOTAL SCORE: 10
HOW LIKELY ARE YOU TO NOD OFF OR FALL ASLEEP WHEN YOU ARE A PASSENGER IN A CAR FOR AN HOUR WITHOUT A BREAK: 1

## 2021-05-10 NOTE — TELEPHONE ENCOUNTER
Within this Telehealth Consent, the terms you and yours refer to the person using the Telehealth Service (Service), or in the case of a use of the Service by or on behalf of a minor, you and yours refer to and include (i) the parent or legal guardian who provides consent to the use of the Service by such minor or uses the Service on behalf of such minor, and (ii) the minor for whom consent is being provided or on whose behalf the Service is being utilized. When using Service, you will be consulting with your health care providers via the use of Telehealth.   Telehealth involves the delivery of healthcare services using electronic communications, information technology or other means between a healthcare provider and a patient who are not in the same physical location. Telehealth may be used for diagnosis, treatment, follow-up and/or patient education, and may include, but is not limited to, one or more of the following:    Electronic transmission of medical records, photo images, personal health information or other data between a patient and a healthcare provider    Interactions between a patient and healthcare provider via audio, video and/or data communications    Use of output data from medical devices, sound and video files    Anticipated Benefits   The use of Telehealth by your Provider(s) through the Service may have the following possible benefits:    Making it easier and more efficient for you to access medical care and treatment for the conditions treated by such Provider(s) utilizing the Service    Allowing you to obtain medical care and treatment by Provider(s) at times that are convenient for you    Enabling you to interact with Provider(s) without the necessity of an in-office appointment     Possible Risks   While the use of Telehealth can provide potential benefits for you, there are also potential risks associated with the use of Telehealth.  These risks include, but may not be the provision of medical care and treatment via Telehealth and the Service and you may not be able to receive diagnosis and/or treatment through the Service for every condition for which you seek diagnosis and/or treatment. 11. There are potential risks to the use of Telehealth, including but not limited to the risks described in this Telehealth Consent. 12. Your Provider(s) have discussed the use of Telehealth and the Service with you, including the benefits and risks of such and you have provided oral consent to your Provider(s) for the use of Telehealth and the Service. 15. You understand that it is your duty to provide your Provider(s) truthful, accurate and complete information, including all relevant information regarding care that you may have received or may be receiving from other healthcare providers outside of the Service. 14. You understand that each of your Provider(s) may determine in his or sole discretion that your condition is not suitable for diagnosis and/or treatment using the Service, and that you may need to seek medical care and treatment a specialist or other healthcare provider, outside of the Service. 15. You understand that you are fully responsible for payment for all services provided by Provider(s) or through use of the Service and that you may not be able to use third-party insurance. 16. You represent that (a) you have read this Telehealth Consent carefully, (b) you understand the risks and benefits of the Service and the use of Telehealth in the medical care and treatment provided to you by Provider(s) using the Service, and (c) you have the legal capacity and authority to provide this consent for yourself and/or the minor for which you are consenting under applicable federal and state laws, including laws relating to the age of [de-identified] and/or parental/guardian consent.    17. You give your informed consent to the use of Telehealth by Provider(s) using the Service under the terms described in the Terms of Service and this Telehealth Consent. The patient was read the following statement and has consented to the visit as of 5/10/21. The patient has been scheduled for their first telehealth visit on 5/10/21 with .

## 2021-05-10 NOTE — PROGRESS NOTES
History:    TOBACCO:   reports that she quit smoking about 7 months ago. Her smoking use included cigarettes. She has a 16.00 pack-year smoking history. She has never used smokeless tobacco.  ETOH:   reports previous alcohol use. Family History:       Problem Relation Age of Onset    Stroke Father 62        1    Diabetes Sister 23        type 1     Diabetes Paternal Grandmother        Current Medications:    Current Outpatient Medications:     Apremilast (OTEZLA) 30 MG TABS, Take by mouth, Disp: , Rfl:     FLUoxetine (PROZAC) 40 MG capsule, Take 1 capsule by mouth daily, Disp: 90 capsule, Rfl: 3    Multiple Vitamins-Minerals (THERAPEUTIC MULTIVITAMIN-MINERALS) tablet, Take 1 tablet by mouth daily, Disp: , Rfl:     BIOTIN PO, Take by mouth, Disp: , Rfl:     Melatonin 5 MG CAPS, Take by mouth nightly , Disp: , Rfl:       REVIEW OF SYSTEMS:  Constitutional: Negative for fever  HENT: Negative for sore throat  Eyes: Negative for redness   Respiratory: Negative for dyspnea, cough  Cardiovascular: Negative for chest pain  Gastrointestinal: Negative for vomiting, diarrhea   Genitourinary: Negative for hematuria   Musculoskeletal: Negative for arthralgias   Skin: Negative for rash  Neurological: Negative for syncope  Hematological: Negative for adenopathy  Psychiatric/Behavorial: Negative for anxiety      Objective:   PHYSICAL EXAM:    not currently breastfeeding.' on RA  O2 Sat:  Neck size: Not able to obtain   Mallampati class IV. Constitutional:  No acute distress. Appears well developed and nourished. Eyes: No sclera icterus. EOM intact. No visible discharge. HENT: Head is normocephalic and atraumatic. Mucus membranes are moist and the tongue appears normal. Normal appearing nose. External Ears normal.   Neck: No visualized mass. [place] Barrier is midline   Resp: No accessory muscle use. Respiratory effort normal. No visualized signs of difficulty breathing or respiratory distress.   Cardiovascular: No LE edema per patient's report   Musculoskeletal: No signs of ataxia. Normal range of motion of the neck. Skin: No significant exanthematous lesions or discoloration noted on facial skin    Neuro: Awake. Alert. Able to follow commands. No facial asymmetry. No gaze palsy. Psych: No agitation. Normal affect. No hallucinations. Oriented to person/time/place. No anxiety. Normal judgement and insight. DATA reviewed by me:   CT chest 8/18/2020 imaging reviewed by me and showed  No PE  Mild dependent atelectasis  Multiple scattered pulmonary nodules, largest 8 mm lingula        Assessment:       · Snoring  · Observed sleep apnea   · Hypersomnia and Fatigue  · Morbid obesity  · Nightmares   · Occasional sleep paralysis    · Preoperative clearance for bariatric surgery  · Abnormal CT chest with nodular infiltrates on CT 8/18/2020. Largest 8 mm in the lingula. Flu/Covid like symptoms at that time with negative Covid test.   · Quit smoking 10/2020, smoked < 1 ppd for 16 years. Plan:       · PSG evaluate for sleep related breathing disorder. Treatment options were discussed with patient if PSG reveals NHAN, including CPAP therapy, oral appliances, upper airway surgery and hypoglossal nerve stimulation. · Discussed with patient the pathophysiology of apnea. · Sleep hygiene  · Avoid sedatives, alcohol and caffeinated drinks at bed time. · No driving motorized vehicles or operating heavy machinery while fatigue, drowsy or sleepy. · Weight loss (medical +/- surgical) is also recommended as a long-term intervention to help improve and possibly cure NHAN. · Complications of NHAN if not treated were discussed with patient patient to include systemic hypertension, pulmonary hypertension, cardiovascular morbidities, car accidents and all cause mortality.   · Repeat CT chest follow-up lung nodules/document resolution of abnormalities  · PFTs   · Follow up after tests   · Advised to continue with smoking

## 2021-05-13 PROBLEM — E55.9 VITAMIN D DEFICIENCY: Status: ACTIVE | Noted: 2021-05-13

## 2021-05-13 PROBLEM — E78.2 MIXED HYPERLIPIDEMIA: Status: ACTIVE | Noted: 2021-05-13

## 2021-05-20 ASSESSMENT — ENCOUNTER SYMPTOMS
COUGH: 0
EYES NEGATIVE: 1
GASTROINTESTINAL NEGATIVE: 1
SHORTNESS OF BREATH: 0
RESPIRATORY NEGATIVE: 1
ALLERGIC/IMMUNOLOGIC NEGATIVE: 1

## 2021-05-20 NOTE — PROGRESS NOTES
[facility]) Physicians   [facility]    Subjective:      Patient ID: Justice Emanuel is a 30 y.o. female    HPI    The patient is here for their bariatric surgery presurgical visit for future weight loss. She has made several attempts at weight loss in the past without success and now wishes to pursue bariatric surgery. She is working to change her dietary behaviors and lose weight to improve comorbid conditions such as hyperlipidemia and GERD. Justice Emanuel is a 30 y.o. female with Body mass index is 49.23 kg/m². Past Medical History:   Diagnosis Date    Cataract     Depression     Genital herpes     one outbreak in     Kidney stone     Lung nodule     Pityriasis versicolor     Psoriasis      Past Surgical History:   Procedure Laterality Date    EYE SURGERY      cataract Rt eye    INDUCED       KIDNEY STONE SURGERY       Family History   Problem Relation Age of Onset    Stroke Father 62        1    Diabetes Sister 23        type 1     Diabetes Paternal Grandmother      Social History     Tobacco Use    Smoking status: Former Smoker     Packs/day: 1.00     Years: 16.00     Pack years: 16.00     Types: Cigarettes     Quit date: 10/2020     Years since quittin.6    Smokeless tobacco: Never Used   Substance Use Topics    Alcohol use: Not Currently     Comment: very rare     I counseled the patient on the importance of not smoking and risks of ETOH. Allergies   Allergen Reactions    Penicillins Nausea And Vomiting     Vitals:    21 1538   BP: 132/75   Pulse: 86   Resp: 18   SpO2: 98%   Weight: (!) 305 lb (138.3 kg)   Height: 5' 6\" (1.676 m)     Body mass index is 49.23 kg/m².     Current Outpatient Medications:     Apremilast (OTEZLA) 30 MG TABS, Take by mouth, Disp: , Rfl:     FLUoxetine (PROZAC) 40 MG capsule, Take 1 capsule by mouth daily, Disp: 90 capsule, Rfl: 3    Multiple Vitamins-Minerals (THERAPEUTIC MULTIVITAMIN-MINERALS) tablet, Take 1 tablet by mouth daily, Disp: , Rfl:     BIOTIN PO, Take by mouth, Disp: , Rfl:     Melatonin 5 MG CAPS, Take by mouth nightly , Disp: , Rfl:     Lab Results   Component Value Date    WBC 10.5 05/05/2021    RBC 4.28 05/05/2021    HGB 13.6 05/05/2021    HCT 39.9 05/05/2021    MCV 93.3 05/05/2021    MCH 31.9 05/05/2021    MCHC 34.2 05/05/2021    MPV 9.4 05/05/2021    NEUTOPHILPCT 70.3 05/05/2021    LYMPHOPCT 20.8 05/05/2021    MONOPCT 7.0 05/05/2021    EOSRELPCT 1.6 05/05/2021    BASOPCT 0.3 05/05/2021    NEUTROABS 7.3 05/05/2021    LYMPHSABS 2.2 05/05/2021    MONOSABS 0.7 05/05/2021    EOSABS 0.2 05/05/2021     Lab Results   Component Value Date     05/05/2021    K 4.0 05/05/2021    K 3.7 07/25/2019     05/05/2021    CO2 20 05/05/2021    ANIONGAP 15 05/05/2021    GLUCOSE 93 05/05/2021    BUN 11 05/05/2021    CREATININE 0.7 05/05/2021    LABGLOM >60 05/05/2021    GFRAA >60 05/05/2021    CALCIUM 9.2 05/05/2021    PROT 7.4 05/05/2021    LABALBU 4.2 05/05/2021    AGRATIO 1.3 05/05/2021    BILITOT 0.8 05/05/2021    ALKPHOS 94 05/05/2021    ALT 25 05/05/2021    AST 21 05/05/2021    GLOB 3.2 05/05/2021     Lab Results   Component Value Date    CHOL 192 05/05/2021    TRIG 247 05/05/2021    HDL 35 05/05/2021    LDLCALC 108 05/05/2021    LABVLDL 49 05/05/2021     Lab Results   Component Value Date    TSHREFLEX 2.15 05/05/2021     Lab Results   Component Value Date    IRON 69 05/05/2021    TIBC 331 05/05/2021    LABIRON 21 05/05/2021     Lab Results   Component Value Date    NZJGLUWM63 445 05/05/2021    FOLATE 17.23 05/05/2021     Lab Results   Component Value Date    VITD25 23.9 05/05/2021     Lab Results   Component Value Date    LABA1C 5.6 05/05/2021    .0 05/05/2021       Review of Systems   Constitutional: Negative. Negative for chills, fatigue and fever. HENT: Negative. Eyes: Negative. Respiratory: Negative. Negative for cough and shortness of breath. Cardiovascular: Negative. Gastrointestinal: Negative. Endocrine: Negative. Genitourinary: Negative. Musculoskeletal: Negative. Skin: Negative. Allergic/Immunologic: Negative. Neurological: Negative. Hematological: Negative. Psychiatric/Behavioral: Negative. Objective:   Physical Exam  Vitals reviewed. Constitutional:       Appearance: She is well-developed. HENT:      Head: Normocephalic and atraumatic. Eyes:      Conjunctiva/sclera: Conjunctivae normal.      Pupils: Pupils are equal, round, and reactive to light. Cardiovascular:      Rate and Rhythm: Normal rate. Pulmonary:      Effort: Pulmonary effort is normal.   Abdominal:      Palpations: Abdomen is soft. Musculoskeletal:         General: Normal range of motion. Cervical back: Normal range of motion and neck supple. Skin:     General: Skin is warm and dry. Neurological:      Mental Status: She is alert and oriented to person, place, and time. Psychiatric:         Behavior: Behavior normal.         Thought Content: Thought content normal.         Judgment: Judgment normal.       Assessment and Plan:   Patient is here for their 2nd presurgery visit for sleeve, down 3 lbs. The patient's current Body mass index is 49.23 kg/m². (5/24/21). She is making dietary and behavior modifications. Needs to continue to work on portions and eliminate caffeine. She did meet with the registered dietitian for continued follow up. I agree with recommendations and plan. She is exercising with walking. Encouraged continued physical activity as tolerated. Patient received instruction that it is recommended to avoid pregnancy following bariatric surgery for at least 2 years to allow them to have stable weight loss and to help avoid increased risk of vitamin deficiencies and malnutrition. The patient was encouraged to discuss possible contraceptive methods with their PCP or OBGYN.  Reviewed recent labs with patient and explained she should see improvement in her lipid panel with dietary changes and weight loss. Patient previously was on Vitamin D supplements, but had stopped. Still has at home so discussed making sure she is taking at least 2000 IU (50 mcg) daily. Discussed preop work up which still needs EGD (Scheduled), sleep clearance (sleep study scheduled 6/8/2021), support group (dietitian signed patient up today), eliminating Marijuana (expleind to patient ideally we will want it gone for at least 8 weeks before surgery), urine drug screen and protein sample (buying some today to try). Did explain to patient that if she has sleep apnea she will need to continue to wear the CPAP moving forward until apnea resolves. We will see her back in 1 month for continued follow up. Hyperlipidemia:   [x] Continue to make dietary and lifestyle modifications per our recommendations. [] Continue to follow up with their PCP for medication management and monitoring. Chronic GERD:   [x] Continue to make dietary and lifestyle modifications per our recommendations. [] Continue PPI. [] Continue H2 Blocker  [] Wean PPI. Take every other day for two weeks. If no issues with heartburn/reflux you may decrease to every third day for two weeks. If no issues with heartburn/reflux you may stop the Prilosec. Recommend that you get OTC Pepcid to take should you have occasional heartburn/reflux. Obesity:  [x] Continue to make dietary and lifestyle modifications per our recommendations.     Total encounter time: 34 minutes, including any number of the following: Bariatric Preoperative work up/protocols, review of labs, imaging, provider notes, outside hospital records, performing examination/evaluation, counseling patient and/or family, ordering medications/tests, placing referrals and communication with referring physicians, coordination of care; discussing exercise and physical activity; discussing dietary plan/recall with the patient as well with registered dietitian and documentation in the EHR. Of note, the above was done during same day of the actual patient encounter.

## 2021-05-20 NOTE — PATIENT INSTRUCTIONS
surgery. Although you may have alcohol in the future it is important to understand, as a bariatric surgery patient, there is a risk of it negatively affecting your weight loss goals and it can pose a risk for addiction. You should be reducing added fat and sugar in your diet.  Frying foods adds too much fat and calories, but you could use an air fryer as it requires significantly less oil. Baking, broiling, or grilling meats add flavor without unhealthy fats. Using cooking oil spray and spray butter products are also healthy options that will aid in your weight loss. Foods high in added sugars are often also high in calories and low in nutrients. If you are a smoker or use e-cigarettes/vaping, you must eliminate.  You must be nicotine free and/or not vaping for at least 8 weeks before your surgery can be scheduled. You will be screened for nicotine through lab work. Eating habits after surgery need to be a long-term change. Eating habits are often so ingrained that it can be difficult to change. It is important to practice new eating habits prior to surgery to mentally prepare yourself for the challenge ahead. Also, remember that overall health, age, and genetics make each person's weight loss progress different. Do not compare your progress (pre- or post-operatively), the amount you eat, or your exercise to other patients. In addition, it is the responsibility of the patient to schedule and follow up on labs and tests completed during the pre-surgical period. Results will be reviewed at each visit. **IT IS IMPORTANT TO KNOW THAT YOU MUST COMPLETE ALL REQUIRED DIETARY CHANGES, TESTS, CLEARANCES AND ACTIVITIES BEFORE A SURGERY DATE CAN BE GIVEN. IF YOU HAVE NOT MET ANY OF THESE REQUIREMENTS THEN YOU WILL NOT BE GIVEN A SURGERY DATE UNTIL THEY HAVE BEEN MET TO OUR SATISFACTION.  THIS IS NOT ONLY DONE TO HELP YOU BE SUCCESSFUL AFTER SURGERY, BUT TO BE SAFE AS WELL.**    Patient received dietary handouts and education.

## 2021-05-24 ENCOUNTER — OFFICE VISIT (OUTPATIENT)
Dept: BARIATRICS/WEIGHT MGMT | Age: 36
End: 2021-05-24
Payer: COMMERCIAL

## 2021-05-24 VITALS
RESPIRATION RATE: 18 BRPM | HEIGHT: 66 IN | SYSTOLIC BLOOD PRESSURE: 132 MMHG | WEIGHT: 293 LBS | BODY MASS INDEX: 47.09 KG/M2 | OXYGEN SATURATION: 98 % | DIASTOLIC BLOOD PRESSURE: 75 MMHG | HEART RATE: 86 BPM

## 2021-05-24 DIAGNOSIS — K21.9 CHRONIC GERD: Primary | ICD-10-CM

## 2021-05-24 DIAGNOSIS — E78.2 MIXED HYPERLIPIDEMIA: ICD-10-CM

## 2021-05-24 DIAGNOSIS — E66.01 MORBID OBESITY WITH BMI OF 45.0-49.9, ADULT (HCC): ICD-10-CM

## 2021-05-24 PROCEDURE — 99214 OFFICE O/P EST MOD 30 MIN: CPT | Performed by: NURSE PRACTITIONER

## 2021-05-24 NOTE — PROGRESS NOTES
Janay Fong lost 3 lbs over past month. Pt feels she has really been decreasing her portions. Has been more mindful of staying away from food at social gatherings. Breakfast: dannon light and fit yogurt OR original oatmeal (plain) made with 1% milk    Snack: string cheese     Lunch: 1/2 [place]  (sub) - turkey/ham/roast beef/cheese/greyson yogesh OR CC fruit     Snack: trail mix - cashews/almonds/dried cranberries OR per chips and hummus OR rice cake     Dinner: Baked salmon/tilapia/crabcakes with edamame/green beans/brussels sprouts with fruit     Snack: sometimes - SF popsicle     Is pt consuming smaller portions? Yes, using smaller plates and portioning out foods     Is pt consuming at least 64 oz of fluids per day? Yes    Is pt consuming carbonated, caffeinated, or sugary beverages? Drinks water, regular coffee with creamer, occasional cranberry juice, eliminated tea, 2 white claws since last visit    Has pt sampled Unjury and/or Nectar protein?  Not yet, discussed today     Exercise: walking 3 x week - 15-20 minutes (1/2 mile)      Plan/Recommendations:   Continue to work on food choices   Focus on protein with all snacks   Switch to decaf coffee / eliminate juice / avoid white claw  Try protein powder   Attend SG - pt signed up for June SG     Handouts: none     Everardo Nichole RD, LD

## 2021-05-29 PROBLEM — Z01.818 PREOPERATIVE CLEARANCE: Status: RESOLVED | Noted: 2021-04-29 | Resolved: 2021-05-29

## 2021-06-03 ENCOUNTER — HOSPITAL ENCOUNTER (OUTPATIENT)
Dept: PULMONOLOGY | Age: 36
Discharge: HOME | End: 2021-06-03
Payer: COMMERCIAL

## 2021-06-03 ENCOUNTER — HOSPITAL ENCOUNTER (OUTPATIENT)
Dept: CT IMAGING | Age: 36
Discharge: HOME | End: 2021-06-03
Payer: COMMERCIAL

## 2021-06-03 DIAGNOSIS — Z01.818 PREOPERATIVE CLEARANCE: ICD-10-CM

## 2021-06-03 DIAGNOSIS — R91.1 PULMONARY NODULE: ICD-10-CM

## 2021-06-03 PROCEDURE — 94729 DIFFUSING CAPACITY: CPT

## 2021-06-03 PROCEDURE — 94640 AIRWAY INHALATION TREATMENT: CPT

## 2021-06-03 PROCEDURE — 94060 EVALUATION OF WHEEZING: CPT

## 2021-06-03 PROCEDURE — 71250 CT THORAX DX C-: CPT

## 2021-06-03 PROCEDURE — 6370000000 HC RX 637 (ALT 250 FOR IP): Performed by: INTERNAL MEDICINE

## 2021-06-03 PROCEDURE — 94618 PULMONARY STRESS TESTING: CPT

## 2021-06-03 PROCEDURE — 94726 PLETHYSMOGRAPHY LUNG VOLUMES: CPT

## 2021-06-03 RX ORDER — ALBUTEROL SULFATE 90 UG/1
4 AEROSOL, METERED RESPIRATORY (INHALATION) ONCE
Status: COMPLETED | OUTPATIENT
Start: 2021-06-03 | End: 2021-06-03

## 2021-06-03 RX ADMIN — Medication 4 PUFF: at 09:13

## 2021-06-08 ENCOUNTER — HOSPITAL ENCOUNTER (OUTPATIENT)
Dept: SLEEP CENTER | Age: 36
Discharge: HOME | End: 2021-06-10
Payer: COMMERCIAL

## 2021-06-08 DIAGNOSIS — G47.30 OBSERVED SLEEP APNEA: ICD-10-CM

## 2021-06-08 DIAGNOSIS — R06.83 SNORING: ICD-10-CM

## 2021-06-08 DIAGNOSIS — R53.83 OTHER FATIGUE: ICD-10-CM

## 2021-06-08 PROCEDURE — 95811 POLYSOM 6/>YRS CPAP 4/> PARM: CPT | Performed by: INTERNAL MEDICINE

## 2021-06-08 PROCEDURE — 95811 POLYSOM 6/>YRS CPAP 4/> PARM: CPT

## 2021-06-08 NOTE — PROCEDURES
[address]                 [address] [address]                               PULMONARY FUNCTION    PATIENT NAME: Sebastian Hewitt                   :        1985  MED REC NO:   [id number]                          ROOM:  ACCOUNT NO:   [de-identified]                           ADMIT DATE: 2021  PROVIDER:     Luann Mercado MD    DATE OF PROCEDURE:  2021    INDICATION:  Preop clearance. FINDINGS:  1. Spirometry: The FEV1 is 3.54 liters, which is 107% of predicted. The FEV1/FVC ratio is normal.  Inhaled bronchodilators are given. There  is no significant improvement. 2.  Lung Volumes: Total lung capacity is normal at 5.79 liters or 104%  of predicted. 3.  Diffusion capacity:  DLCO is 26.98 mL/min/mmHg, which is 100% of  predicted. 4.  Flow volume loop is normal.  5.  Six-minute walk test per [facility] protocol. Baseline oxygen  saturation 97%. Lowest oxygen saturation 96%. The patient ambulated  1680 feet. IMPRESSION:  1. No obstructive or restrictive lung defect. 2.  Normal diffusion capacity. 3.  No significant oxyhemoglobin desaturation on six-minute walk  testing.         Linda Billings MD    D: 2021 12:49:54       T: 2021 13:49:02     [id number]  Job#: [id number]     Doc#: [id number]    CC:

## 2021-06-15 ENCOUNTER — VIRTUAL VISIT (OUTPATIENT)
Dept: PULMONOLOGY | Age: 36
End: 2021-06-15
Payer: COMMERCIAL

## 2021-06-15 DIAGNOSIS — G47.10 HYPERSOMNIA: ICD-10-CM

## 2021-06-15 DIAGNOSIS — G47.33 SEVERE OBSTRUCTIVE SLEEP APNEA: Primary | ICD-10-CM

## 2021-06-15 PROCEDURE — 99214 OFFICE O/P EST MOD 30 MIN: CPT | Performed by: INTERNAL MEDICINE

## 2021-06-15 ASSESSMENT — SLEEP AND FATIGUE QUESTIONNAIRES
ESS TOTAL SCORE: 5
HOW LIKELY ARE YOU TO NOD OFF OR FALL ASLEEP WHEN YOU ARE A PASSENGER IN A CAR FOR AN HOUR WITHOUT A BREAK: 0
HOW LIKELY ARE YOU TO NOD OFF OR FALL ASLEEP IN A CAR, WHILE STOPPED FOR A FEW MINUTES IN TRAFFIC: 0
HOW LIKELY ARE YOU TO NOD OFF OR FALL ASLEEP WHILE SITTING AND READING: 1
HOW LIKELY ARE YOU TO NOD OFF OR FALL ASLEEP WHILE LYING DOWN TO REST IN THE AFTERNOON WHEN CIRCUMSTANCES PERMIT: 3
HOW LIKELY ARE YOU TO NOD OFF OR FALL ASLEEP WHILE SITTING AND TALKING TO SOMEONE: 0
HOW LIKELY ARE YOU TO NOD OFF OR FALL ASLEEP WHILE SITTING INACTIVE IN A PUBLIC PLACE: 0
HOW LIKELY ARE YOU TO NOD OFF OR FALL ASLEEP WHILE SITTING QUIETLY AFTER LUNCH WITHOUT ALCOHOL: 1
HOW LIKELY ARE YOU TO NOD OFF OR FALL ASLEEP WHILE WATCHING TV: 0

## 2021-06-15 NOTE — PROGRESS NOTES
[facility]                                          TELEHEALTH EVALUATION: Service performed was Audio/Visual (During RXGJW-50 public health emergency) and not a face-to-face visit                     CHIEF COMPLAINT: Follow up tests results       HPI:   Split night, CT and PFTs were reviewed by me and noted below. Results were dicussed with patient and multiple good questions were answered. Has not started her CPAP yet  Felt much better during her CPAP titration       From prior visit:   Snoring at night for the past 3-4 years. The severity of snoring is severe. Snoring is interrupting sleep of others. Worse in supine position and better on the side. Has observed sleep apnea. No restorative sleep. No dry mouth upon awakening. Patien choking gasping for air t is complaining of daytime sleepiness, fatigue and tiredness during the day. Bedtime 10-11 pm and rise time is 6 am. Sleep onset 5 minutes. Vivid dreams every night, remembers them in am for several yeas. Sleep paralysis for 3-4 years, upon going to sleep, once every other month. No RBD like symptoms. 1 nocturia. 3 naps/weekf or 3-4 hrs. No car wrecks or near wrecks because of the sleepiness. No nodding off while driving. ESS is 10. Old records were reviewed and summarized by me. Seen in ER August with flu like symptoms, had CT chest showed bilateral lung nodules. Patient is currently being evaluated for gastric sleeve. No date is given yet for the surgery. No known lung disease. No recurrent respiratory infection. No IBD or O2 use. Denies any SOB, cough or wheezes. No smoking- quit 10/2020, smoked < 1 ppd for 16 years.           Past Medical History:   Diagnosis Date    Cataract     Depression     Genital herpes     one outbreak in 2007    Kidney stone     Lung nodule     Pityriasis versicolor     Psoriasis        Past Surgical History:        Procedure Laterality Date    EYE SURGERY      cataract Rt eye    INDUCED       KIDNEY STONE SURGERY         Allergies:  is allergic to penicillins. Social History:    TOBACCO:   reports that she quit smoking about 8 months ago. Her smoking use included cigarettes. She has a 16.00 pack-year smoking history. She has never used smokeless tobacco.  ETOH:   reports previous alcohol use. Family History:       Problem Relation Age of Onset    Stroke Father 62        1    Diabetes Sister 23        type 1     Diabetes Paternal Grandmother        Current Medications:    Current Outpatient Medications:     Apremilast (OTEZLA) 30 MG TABS, Take by mouth, Disp: , Rfl:     FLUoxetine (PROZAC) 40 MG capsule, Take 1 capsule by mouth daily, Disp: 90 capsule, Rfl: 3    Multiple Vitamins-Minerals (THERAPEUTIC MULTIVITAMIN-MINERALS) tablet, Take 1 tablet by mouth daily, Disp: , Rfl:     BIOTIN PO, Take by mouth, Disp: , Rfl:     Melatonin 5 MG CAPS, Take by mouth nightly , Disp: , Rfl:         Objective:   PHYSICAL EXAM:    not currently breastfeeding.' on RA  O2 Sat:  Neck size: Not able to obtain   Mallampati class IV. Constitutional:  No acute distress. Appears well developed and nourished. Eyes: No sclera icterus. EOM intact. No visible discharge. HENT: Head is normocephalic and atraumatic. Mucus membranes are moist and the tongue appears normal. Normal appearing nose. External Ears normal.   Neck: No visualized mass. Colen Grumbling is midline   Resp: No accessory muscle use. Respiratory effort normal. No visualized signs of difficulty breathing or respiratory distress. Cardiovascular: No LE edema per patient's report   Musculoskeletal: No signs of ataxia. Normal range of motion of the neck. Skin: No significant exanthematous lesions or discoloration noted on facial skin    Neuro: Awake. Alert. Able to follow commands. No facial asymmetry. No gaze palsy. Psych: No agitation. Normal affect. No hallucinations. risk with 4.3% pulmonary complication rate. Perioperative pulmonary complications were discussed with the patient including atelectasis, infection and prolonged mechanical ventilation. I recommend:  1- Airway extubation (if patient requires intubation for surgery) performed only when the patient is fully awake and alert. 2- Removal of the endotracheal tube should take place in the operating room or PACU. 3- The patient should be maintained in the semiupright or lateral, not supine, position and should undergo continuous cardiorespiratory monitoring. 4- Immediate application of CPAP with or without a nasopharyngeal airway will help prevent upper airway collapse. 5- Systemic opioids should be used cautiously because of their ability to depress the respiratory drive  6- Benzodiazepines should be avoided because of their negative effects on the respiratory control and upper airway musculature  7- Deep breathing exercises or incentive spirometr  8- Early ambulation and DVT prophylaxis. Justice Emanuel is a 30 y.o. female being evaluated by a Virtual Visit (video visit) encounter to address concerns as mentioned above. A caregiver was present when appropriate. Due to this being a TeleHealth encounter (During [facility] public health emergency), evaluation of the following organ systems was limited: Vitals/Constitutional/EENT/Resp/CV/GI//MS/Neuro/Skin/Heme-Lymph-Imm. Pursuant to the emergency declaration under the [address] [place] [address], [address] [place] [address] authority and the [employer] Act, this Virtual Visit was conducted with patient's (and/or legal guardian's) consent, to reduce the patient's risk of exposure to COVID-19 and provide necessary medical care.   The patient (and/or legal guardian) has also been advised to contact this office for worsening conditions or problems, and seek emergency medical treatment and/or call 911 if

## 2021-06-15 NOTE — PATIENT INSTRUCTIONS
Remember to bring all pulmonary medications to your next appointment with the office. Please keep all of your future appointments scheduled by [facility]. Out of respect for other patients and providers, you may be asked to reschedule your appointment if you arrive later than your scheduled appointment time. Appointments cancelled less than 24hrs in advance will be considered a no show. Patients with three missed appointments within 1 year or four missed appointments within 2 years can be dismissed from the practice. CPAP Equipment Cleaning and Disinfecting Schedule  Equipment Cleaning Frequency Instructions  Disinfecting Frequency   Non-Disposable Filters  Weekly Mild soapy water, Rinse, Air Dry Not Required   Disposable Filters Change as needed  2-4 weeks Do Not Wash Not Required   Hose/tubing Daily Mild soapy water, Rinse, Air Dry Once a week   Mask / Nasal Pillows Daily Mild soapy water, Rinse, Air Dry Once a week   Headgear Weekly Hand wash, Mild soapy water, Rinse, Dry  Not Required   Humidifier Daily Empty water daily  Mild soapy water, Rinse well, Air Dry  Once a week   CPAP Unit As Needed Dust with damp cloth,  No detergents or sprays Not Required         Disinfect (per schedule) with 1 part white vinegar and 3 parts water- soak mask and water chamber for 30 minutes every 1-2 weeks, more often if sick. Allow water/vinegar mixture to run through tubing. Allow all equipment to air dry. Drying Hints:   Always hang tubing away from direct sunlight, as this will cause the tubing to become yellow, brittle and crack over a period of time. DO NOT attach the wet tubing to your CPAP unit to blow-dry it. The moisture from the tubing can drain back into your machine.  Moisture in your unit can cause sudden pressure increases or short circuits  DO's and DON'Ts:  - Don't use alcohol-based products to clean your mask, because it can cause the materials to become hard and your favorite sleep position. If not asleep in 30 minutes, get up and do something boring until you feel sleepy. Remember not to expose yourself to bright lights such as TV, phone or tablet screens. Only use your bed for sleeping. Do not use your bed as an office, workroom or recreation room. Use comfortable bedding. Uncomfortable bedding can prevent good sleep. Ensure your bedroom is quiet and comfortable. A cooler room along with enough blankets to stay warm is recommended. If your room is too noisy, try a white noise machine. If too bright, try black out shades or an eye mask. Dont take worries to bed. Leave worries about work, school etc. behind you when you go to bed. Some people find it helpful to assign a worry period in the evening or late afternoon to write down your worries and get them out of your system.

## 2021-06-21 ASSESSMENT — ENCOUNTER SYMPTOMS
COUGH: 0
ALLERGIC/IMMUNOLOGIC NEGATIVE: 1
GASTROINTESTINAL NEGATIVE: 1
SHORTNESS OF BREATH: 0
EYES NEGATIVE: 1
RESPIRATORY NEGATIVE: 1

## 2021-06-21 NOTE — PROGRESS NOTES
[facility]) Physicians   [employer] [facility] [employer]    Subjective:      Patient ID: Inderjit Rooney is a 30 y.o. female    HPI    The patient is here for their bariatric surgery presurgical visit for future weight loss. She has made several attempts at weight loss in the past without success and now wishes to pursue bariatric surgery. She is working to change her dietary behaviors and lose weight to improve comorbid conditions such as hyperlipidemia, obstructive sleep apnea and GERD. Inderjit Rooney is a 30 y.o. female with Body mass index is 47.13 kg/m². Past Medical History:   Diagnosis Date    Cataract     Depression     Genital herpes     one outbreak in     Kidney stone     Lung nodule     Pityriasis versicolor     Psoriasis      Past Surgical History:   Procedure Laterality Date    EYE SURGERY      cataract Rt eye    INDUCED       KIDNEY STONE SURGERY       Family History   Problem Relation Age of Onset    Stroke Father 62        1    Diabetes Sister 23        type 1     Diabetes Paternal Grandmother      Social History     Tobacco Use    Smoking status: Former Smoker     Packs/day: 1.00     Years: 16.00     Pack years: 16.00     Types: Cigarettes     Quit date: 10/2020     Years since quittin.7    Smokeless tobacco: Never Used   Substance Use Topics    Alcohol use: Not Currently     Comment: very rare     I counseled the patient on the importance of not smoking and risks of ETOH. Allergies   Allergen Reactions    Penicillins Nausea And Vomiting     Vitals:    21 1533   BP: 110/60   Pulse: 94   Resp: 16   SpO2: 98%   Weight: 292 lb (132.5 kg)   Height: 5' 6\" (1.676 m)     Body mass index is 47.13 kg/m².     Current Outpatient Medications:     Apremilast (OTEZLA) 30 MG TABS, Take 30 mg by mouth 2 times daily , Disp: , Rfl:     FLUoxetine (PROZAC) 40 MG capsule, Take 1 capsule by mouth daily, Disp: 90 capsule, Rfl: 3    Multiple Vitamins-Minerals (THERAPEUTIC MULTIVITAMIN-MINERALS) tablet, Take 1 tablet by mouth daily, Disp: , Rfl:     BIOTIN PO, Take by mouth, Disp: , Rfl:     Melatonin 5 MG CAPS, Take by mouth nightly , Disp: , Rfl:     Lab Results   Component Value Date    WBC 10.5 05/05/2021    RBC 4.28 05/05/2021    HGB 13.6 05/05/2021    HCT 39.9 05/05/2021    MCV 93.3 05/05/2021    MCH 31.9 05/05/2021    MCHC 34.2 05/05/2021    MPV 9.4 05/05/2021    NEUTOPHILPCT 70.3 05/05/2021    LYMPHOPCT 20.8 05/05/2021    MONOPCT 7.0 05/05/2021    EOSRELPCT 1.6 05/05/2021    BASOPCT 0.3 05/05/2021    NEUTROABS 7.3 05/05/2021    LYMPHSABS 2.2 05/05/2021    MONOSABS 0.7 05/05/2021    EOSABS 0.2 05/05/2021     Lab Results   Component Value Date     05/05/2021    K 4.0 05/05/2021    K 3.7 07/25/2019     05/05/2021    CO2 20 05/05/2021    ANIONGAP 15 05/05/2021    GLUCOSE 93 05/05/2021    BUN 11 05/05/2021    CREATININE 0.7 05/05/2021    LABGLOM >60 05/05/2021    GFRAA >60 05/05/2021    CALCIUM 9.2 05/05/2021    PROT 7.4 05/05/2021    LABALBU 4.2 05/05/2021    AGRATIO 1.3 05/05/2021    BILITOT 0.8 05/05/2021    ALKPHOS 94 05/05/2021    ALT 25 05/05/2021    AST 21 05/05/2021    GLOB 3.2 05/05/2021     Lab Results   Component Value Date    CHOL 192 05/05/2021    TRIG 247 05/05/2021    HDL 35 05/05/2021    LDLCALC 108 05/05/2021    LABVLDL 49 05/05/2021     Lab Results   Component Value Date    TSHREFLEX 2.15 05/05/2021     Lab Results   Component Value Date    IRON 69 05/05/2021    TIBC 331 05/05/2021    LABIRON 21 05/05/2021     Lab Results   Component Value Date    RBZFYDGV51 445 05/05/2021    FOLATE 17.23 05/05/2021     Lab Results   Component Value Date    VITD25 23.9 05/05/2021     Lab Results   Component Value Date    LABA1C 5.6 05/05/2021    .0 05/05/2021       Review of Systems   Constitutional: Negative. Negative for chills, fatigue and fever. HENT: Negative. Eyes: Negative. Respiratory: Negative.   Negative for cough and shortness of breath. Cardiovascular: Negative. Gastrointestinal: Negative. Endocrine: Negative. Genitourinary: Negative. Musculoskeletal: Negative. Skin: Negative. Allergic/Immunologic: Negative. Neurological: Negative. Hematological: Negative. Psychiatric/Behavioral: Negative. Objective:   Physical Exam  Vitals reviewed. Constitutional:       Appearance: She is well-developed. HENT:      Head: Normocephalic and atraumatic. Eyes:      Conjunctiva/sclera: Conjunctivae normal.      Pupils: Pupils are equal, round, and reactive to light. Cardiovascular:      Rate and Rhythm: Normal rate. Pulmonary:      Effort: Pulmonary effort is normal.   Abdominal:      Palpations: Abdomen is soft. Musculoskeletal:         General: Normal range of motion. Cervical back: Normal range of motion and neck supple. Skin:     General: Skin is warm and dry. Neurological:      Mental Status: She is alert and oriented to person, place, and time. Psychiatric:         Behavior: Behavior normal.         Thought Content: Thought content normal.         Judgment: Judgment normal.       Assessment and Plan:   Patient is here for their 3rd presurgery visit for sleeve, down 13 lbs. The patient's current Body mass index is 47.13 kg/m². (6/28/21). She is making good dietary and behavior modifications. She did meet with the registered dietitian for continued follow up. I agree with recommendations and plan. She is exercising with walking six days per week. Encouraged continued physical activity. Patient received instruction that it is recommended to avoid pregnancy following bariatric surgery for at least 2 years to allow them to have stable weight loss and to help avoid increased risk of vitamin deficiencies and malnutrition. The patient was encouraged to discuss possible contraceptive methods with their PCP or OBGYN.  Discussed preop work up which still needs EGD (scheduled 7/2/2021), sleep clearance (still awaiting on CPAP patient following up with DME tomorrow as they have not been that helpful), smoking cessation (Patient needs to stop marijuana. Explained to patient that they must be marijuana free for at least 6 weeks prior to scheduling surgery) and urine drug screen. We will see her back in 1 month for continued follow up. Hyperlipidemia:   [x] Continue to make dietary and lifestyle modifications per our recommendations. [] Continue to follow up with their PCP for medication management and monitoring. Obstructive Sleep Apnea:   [x] Continue to make dietary and lifestyle modifications per our recommendations. [x] Reviewed the importance of wearing your CPAP/BIPAP. [x] Continue to follow up with their sleep medicine provider for CPAP/BIPAP management and monitoring. Chronic GERD:   [x] Continue to make dietary and lifestyle modifications per our recommendations. [] Continue PPI. [] Continue H2 Blocker  [] Wean PPI. Take every other day for two weeks. If no issues with heartburn/reflux you may decrease to every third day for two weeks. If no issues with heartburn/reflux you may stop the Prilosec. Recommend that you get OTC Pepcid to take should you have occasional heartburn/reflux. Obesity:  [x] Continue to make dietary and lifestyle modifications per our recommendations. [x] Plan for Future laparoscopic sleeve gastrectomy. [x] Return for follow-up next month. Total encounter time: 27 minutes, including any number of the following: Bariatric Preoperative work up/protocols, review of labs, imaging, provider notes, outside hospital records, performing examination/evaluation, counseling patient and/or family, ordering medications/tests, placing referrals and communication with referring physicians, coordination of care; discussing exercise and physical activity; discussing dietary plan/recall with the patient as well with registered dietitian and documentation in the EHR.  Of note, the above was done during same day of the actual patient encounter.

## 2021-06-21 NOTE — PATIENT INSTRUCTIONS
Goals in preparing for bariatric surgery  You should be giving up all beverages that have carbonation, sugar, and caffeine (Refer to the approved liquids list provided at initial visit).  You should be drinking 64 ounces of low calorie (5 calories or less per serving) fluids per day. Suggestions include:  o Water (you may add fresh lemon or lime)  o Crystal Light  o Petey Liquid Water Enhancer  o Propel Zero  o Powerade Zero/Gatorade Zero  o Isopure  o [id number]  o SOBE Lifewater Zero  o Vitamin Water Zero  o Sugar Free Domingo-Aid  You should be eating 4-6 times per day.  Three small meals plus 1-2 snacks per day is your goal. This balances your calories and nutrients evenly throughout the day and helps to boost your metabolism. Refer to the snack list provided at your initial visit. Aim for a protein at every snack, plus a fruit, vegetable or starch. You should be eating protein at every meal and snack.  Protein is typically found in animal sources, i.e. chicken, lean beef, lean pork, fish, seafood and eggs. It is also found in low-fat dairy sources such as skim or 1% milk, low-fat yogurt, low-fat cheese, and low-fat cottage cheese. Plant based sources of protein include peanut butter, beans, and soy. You should be utilizing the 9-inch plate method.  Eating on a smaller plate will help you control portion size, but what you put on your plate counts also. Make ¼ of your plate lean protein, ¼ carbohydrate (fruit, grain or starchy vegetables) and ½ the plate non-starchy vegetables. You should eliminate caffeine.  Caffeine is dehydrating. After surgery, it's very important to stay hydrated. Giving up caffeine before surgery will help you focus on the changes necessary to be successful after surgery. There are many decaffeinated coffee and tea products available in grocery stores. You should eliminate alcohol.    You must abstain from alcohol prior to surgery and for at least the first 6-9 months after surgery. Although you may have alcohol in the future it is important to understand, as a bariatric surgery patient, there is a risk of it negatively affecting your weight loss goals and it can pose a risk for addiction. You should be reducing added fat and sugar in your diet.  Frying foods adds too much fat and calories, but you could use an air fryer as it requires significantly less oil. Baking, broiling, or grilling meats add flavor without unhealthy fats. Using cooking oil spray and spray butter products are also healthy options that will aid in your weight loss. Foods high in added sugars are often also high in calories and low in nutrients. If you are a smoker or use e-cigarettes/vaping, you must eliminate.  You must be nicotine free and/or not vaping for at least 8 weeks before your surgery can be scheduled. You will be screened for nicotine through lab work. Eating habits after surgery need to be a long-term change. Eating habits are often so ingrained that it can be difficult to change. It is important to practice new eating habits prior to surgery to mentally prepare yourself for the challenge ahead. Also, remember that overall health, age, and genetics make each person's weight loss progress different. Do not compare your progress (pre- or post-operatively), the amount you eat, or your exercise to other patients. In addition, it is the responsibility of the patient to schedule and follow up on labs and tests completed during the pre-surgical period. Results will be reviewed at each visit. **IT IS IMPORTANT TO KNOW THAT YOU MUST COMPLETE ALL REQUIRED DIETARY CHANGES, TESTS, CLEARANCES AND ACTIVITIES BEFORE A SURGERY DATE CAN BE GIVEN. IF YOU HAVE NOT MET ANY OF THESE REQUIREMENTS THEN YOU WILL NOT BE GIVEN A SURGERY DATE UNTIL THEY HAVE BEEN MET TO OUR SATISFACTION.  THIS IS NOT ONLY DONE TO HELP YOU BE SUCCESSFUL AFTER SURGERY, BUT TO BE SAFE AS WELL.**    Patient received dietary

## 2021-06-22 NOTE — PROGRESS NOTES
Patient reached ____ yes  ___X__ no   VM instructions left __X__ yes   phone number __[phone]______                                ____ no-office notified          Date __7/2/21_______  Time __0930_____  Arrival ___0730  hosp-endo___    Nothing to eat or drink after midnight-follow your doctors prep instructions-this may include taking a second dose of your prep after midnight  Responsible adult 25 or older to stay on site while you are here-drive you home-stay with you after  Follow any instructions your doctors office has given you  Bring a complete list of all your medications and supplements including name,dose,how often taken the day of your procedure  If you normally take the following medications in the morning please do so the AM of your procedure with a small sip of water       Heart,blood pressure,seizure,thyroid or breathing medications-use your inhalers       DO NOT take blood pressure medications ending in \"kalyn\" or \"pril\" the AM of procedure or evening prior  Take half or your normal dose of any long acting insulins the night before your procedure-do not take any diabetic medications the AM of procedure  Follow your doctors instructions regarding stopping or taking  any blood thinners-if you do not have instructions-call them  Any questions call your doctor  Other ______________________________________________________________      COVID TEST     __ done- where ____  __ scheduled _____ where ___  _X_ other _vm to test here by 6/30/21_________  __  VACCINATED-instructed to bring card        VISITOR POLICY(subject to change)         There is a one visitor policy at [facility] for all surgeries and endoscopies. Whether the visitor can stay or will be asked to wait in the car will depend on the current policy and if social distancing can be maintained. The policy is subject to change at any time. Please make sure the visitor has a cell phone that is on,charged and able to accept calls, as this may be the way that the staff communicates with them. Pain management is NO VISITOR policyThe patients ride is expected to remain in the car with a cell phone for communication. If the ride is leaving the hospital grounds please make sure they are back in time for pickup. Have the patient inform the staff on arrival what their rides plans are while the patient is in the facility. At the MAIN there is one visitor allowed. Please note that the visitor policy is subject to change.

## 2021-06-28 ENCOUNTER — OFFICE VISIT (OUTPATIENT)
Dept: BARIATRICS/WEIGHT MGMT | Age: 36
End: 2021-06-28
Payer: COMMERCIAL

## 2021-06-28 VITALS
WEIGHT: 292 LBS | RESPIRATION RATE: 16 BRPM | SYSTOLIC BLOOD PRESSURE: 110 MMHG | BODY MASS INDEX: 46.93 KG/M2 | HEIGHT: 66 IN | DIASTOLIC BLOOD PRESSURE: 60 MMHG | OXYGEN SATURATION: 98 % | HEART RATE: 94 BPM

## 2021-06-28 DIAGNOSIS — E78.2 MIXED HYPERLIPIDEMIA: ICD-10-CM

## 2021-06-28 DIAGNOSIS — G47.30 OBSERVED SLEEP APNEA: ICD-10-CM

## 2021-06-28 DIAGNOSIS — E66.01 MORBID OBESITY WITH BMI OF 45.0-49.9, ADULT (HCC): ICD-10-CM

## 2021-06-28 DIAGNOSIS — K21.9 CHRONIC GERD: Primary | ICD-10-CM

## 2021-06-28 PROCEDURE — 99213 OFFICE O/P EST LOW 20 MIN: CPT | Performed by: NURSE PRACTITIONER

## 2021-06-28 NOTE — PROGRESS NOTES
Janay Fong lost 13 lbs over past ~month. Is pt eating at least 4 times everyday? yes - 3 meals & 3 snacks    Is pt eating a lean protein source with all meals and snacks? yes - cheese / almonds / chicken / salmon / tilapia    Has pt decreased their portions using the plate method? yes - using smaller plate / eating 1/2 of what she use to eat    Is pt choosing low fat/sugar free options? yes    Is pt drinking at least 64 oz of clear liquids everyday? yes - water / decaf hot tea    Has pt stopped drinking carbonation, caffeinated, and sugar sweetened beverages? 1 coke this month    Has pt sampled Unjury and/or Nectar protein?  yes - tried & tolerated    Participating in intentional exercise? yes - walking 6x/wk for ~1 mile    Plan/Recommendations:   - Continue plan  - Avoid coke    Handouts: none    Jasbir Sanchez RD, LD

## 2021-07-02 ENCOUNTER — ANESTHESIA EVENT (OUTPATIENT)
Dept: ENDOSCOPY | Age: 36
End: 2021-07-02
Payer: COMMERCIAL

## 2021-07-02 ENCOUNTER — ANESTHESIA (OUTPATIENT)
Dept: ENDOSCOPY | Age: 36
End: 2021-07-02
Payer: COMMERCIAL

## 2021-07-02 ENCOUNTER — HOSPITAL ENCOUNTER (OUTPATIENT)
Age: 36
Setting detail: OUTPATIENT SURGERY
Discharge: HOME | End: 2021-07-02
Attending: SURGERY | Admitting: SURGERY
Payer: COMMERCIAL

## 2021-07-02 VITALS
SYSTOLIC BLOOD PRESSURE: 105 MMHG | HEIGHT: 66 IN | HEART RATE: 65 BPM | RESPIRATION RATE: 16 BRPM | WEIGHT: 288 LBS | DIASTOLIC BLOOD PRESSURE: 77 MMHG | OXYGEN SATURATION: 100 % | BODY MASS INDEX: 46.28 KG/M2 | TEMPERATURE: 97.2 F

## 2021-07-02 VITALS
OXYGEN SATURATION: 99 % | SYSTOLIC BLOOD PRESSURE: 110 MMHG | DIASTOLIC BLOOD PRESSURE: 66 MMHG | RESPIRATION RATE: 1 BRPM

## 2021-07-02 LAB
HCG(URINE) PREGNANCY TEST: NEGATIVE
SARS-COV-2, NAAT: NOT DETECTED

## 2021-07-02 PROCEDURE — 2709999900 HC NON-CHARGEABLE SUPPLY: Performed by: SURGERY

## 2021-07-02 PROCEDURE — 7100000010 HC PHASE II RECOVERY - FIRST 15 MIN: Performed by: SURGERY

## 2021-07-02 PROCEDURE — 84703 CHORIONIC GONADOTROPIN ASSAY: CPT

## 2021-07-02 PROCEDURE — 88305 TISSUE EXAM BY PATHOLOGIST: CPT

## 2021-07-02 PROCEDURE — 2500000003 HC RX 250 WO HCPCS: Performed by: NURSE ANESTHETIST, CERTIFIED REGISTERED

## 2021-07-02 PROCEDURE — 87635 SARS-COV-2 COVID-19 AMP PRB: CPT

## 2021-07-02 PROCEDURE — 3700000000 HC ANESTHESIA ATTENDED CARE: Performed by: SURGERY

## 2021-07-02 PROCEDURE — 6360000002 HC RX W HCPCS: Performed by: NURSE ANESTHETIST, CERTIFIED REGISTERED

## 2021-07-02 PROCEDURE — 2580000003 HC RX 258: Performed by: SURGERY

## 2021-07-02 PROCEDURE — 7100000011 HC PHASE II RECOVERY - ADDTL 15 MIN: Performed by: SURGERY

## 2021-07-02 PROCEDURE — 43239 EGD BIOPSY SINGLE/MULTIPLE: CPT | Performed by: SURGERY

## 2021-07-02 PROCEDURE — 3609012400 HC EGD TRANSORAL BIOPSY SINGLE/MULTIPLE: Performed by: SURGERY

## 2021-07-02 RX ORDER — PROPOFOL 10 MG/ML
INJECTION, EMULSION INTRAVENOUS PRN
Status: DISCONTINUED | OUTPATIENT
Start: 2021-07-02 | End: 2021-07-02 | Stop reason: SDUPTHER

## 2021-07-02 RX ORDER — OMEPRAZOLE 20 MG/1
20 CAPSULE, DELAYED RELEASE ORAL DAILY
Qty: 30 CAPSULE | Refills: 3 | Status: SHIPPED | OUTPATIENT
Start: 2021-07-02 | End: 2021-07-26 | Stop reason: SDUPTHER

## 2021-07-02 RX ORDER — SODIUM CHLORIDE 9 MG/ML
INJECTION, SOLUTION INTRAVENOUS CONTINUOUS
Status: DISCONTINUED | OUTPATIENT
Start: 2021-07-02 | End: 2021-07-02 | Stop reason: HOSPADM

## 2021-07-02 RX ORDER — MULTIVIT-MIN/IRON/FOLIC ACID/K 18-600-40
CAPSULE ORAL 2 TIMES DAILY
COMMUNITY
End: 2022-10-25

## 2021-07-02 RX ORDER — LIDOCAINE HYDROCHLORIDE 20 MG/ML
INJECTION, SOLUTION EPIDURAL; INFILTRATION; INTRACAUDAL; PERINEURAL PRN
Status: DISCONTINUED | OUTPATIENT
Start: 2021-07-02 | End: 2021-07-02 | Stop reason: SDUPTHER

## 2021-07-02 RX ADMIN — SODIUM CHLORIDE: 9 INJECTION, SOLUTION INTRAVENOUS at 08:13

## 2021-07-02 RX ADMIN — PROPOFOL 50 MG: 10 INJECTION, EMULSION INTRAVENOUS at 08:58

## 2021-07-02 RX ADMIN — PROPOFOL 50 MG: 10 INJECTION, EMULSION INTRAVENOUS at 08:57

## 2021-07-02 RX ADMIN — LIDOCAINE HYDROCHLORIDE 50 MG: 20 INJECTION, SOLUTION EPIDURAL; INFILTRATION; INTRACAUDAL; PERINEURAL at 08:57

## 2021-07-02 RX ADMIN — PROPOFOL 50 MG: 10 INJECTION, EMULSION INTRAVENOUS at 08:59

## 2021-07-02 ASSESSMENT — PULMONARY FUNCTION TESTS
PIF_VALUE: 1
PIF_VALUE: 0
PIF_VALUE: 0
PIF_VALUE: 1

## 2021-07-02 ASSESSMENT — ENCOUNTER SYMPTOMS: SHORTNESS OF BREATH: 0

## 2021-07-02 ASSESSMENT — PAIN SCALES - GENERAL
PAINLEVEL_OUTOF10: 0

## 2021-07-02 ASSESSMENT — PAIN - FUNCTIONAL ASSESSMENT: PAIN_FUNCTIONAL_ASSESSMENT: 0-10

## 2021-07-02 NOTE — H&P
[facility] [address] Physicians   [employer] [facility] [employer]  Attending Pre-operative History and Physical      DIAGNOSIS:  Obesity    INDICATION:  Pre-op    PROCEDURE:  EGD    CHIEF COMPLAINT:  Obesity    History Obtained From:  patient    HISTORY OF PRESENT ILLNESS:    The patient is a 30 y.o. female with significant past medical history of   Patient Active Problem List   Diagnosis    Recurrent major depressive disorder[facility] full [facility] [address]    Morbid obesity with BMI of 45.0-49.9, adult ([facility] [address]    Chronic GERD    Snoring    Mixed hyperlipidemia    Vitamin D deficiency    Observed sleep apnea    Other fatigue      who presents for pre-op EGD    Past Medical History:        Diagnosis Date    Cataract     Depression     Genital herpes     one outbreak in     Kidney stone     Lung nodule     Pityriasis versicolor     Psoriasis      Past Surgical History:        Procedure Laterality Date    EYE SURGERY      cataract Rt eye    INDUCED       KIDNEY STONE SURGERY       Medications Prior to Admission:   Medications Prior to Admission: Cholecalciferol (VITAMIN D) 50 MCG ( UT) CAPS capsule, Take by mouth 2 times daily  Apremilast (OTEZLA) 30 MG TABS, Take 30 mg by mouth 2 times daily   FLUoxetine (PROZAC) 40 MG capsule, Take 1 capsule by mouth daily  Multiple Vitamins-Minerals (THERAPEUTIC MULTIVITAMIN-MINERALS) tablet, Take 1 tablet by mouth daily  BIOTIN PO, Take 1,000 mcg by mouth daily   Melatonin 5 MG CAPS, Take by mouth nightly     Allergies:  Penicillins    Social History:   TOBACCO:   reports that she quit smoking about 9 months ago. Her smoking use included cigarettes. She has a 16.00 pack-year smoking history. She has never used smokeless tobacco.  ETOH:   reports previous alcohol use.   Family History:       Problem Relation Age of Onset    Stroke Father 62        1    Diabetes Sister 23        type 1     Diabetes Paternal Grandmother          REVIEW OF SYSTEMS:    Review of Systems - History obtained from the patient  General ROS: negative  Psychological ROS: negative  Ophthalmic ROS: negative  Neurological ROS: negative  ENT ROS: negative  Allergy and Immunology ROS: negative  Hematological and Lymphatic ROS: negative  Endocrine ROS: negative  Breast ROS: negative  Respiratory ROS: negative  Cardiovascular ROS: negative  Gastrointestinal ROS:negative  Genito-Urinary ROS: negative  Musculoskeletal ROS: negative   Skin ROS: negative      PHYSICAL EXAM:      /81   Pulse 81   Temp 96.7 °F (35.9 °C) (Temporal)   Resp 16   Ht 5' 6\" (1.676 m)   Wt 288 lb (130.6 kg)   LMP 05/25/2021   SpO2 98%   BMI 46.48 kg/m²  I      Physical Exam   Vitals Reviewed   Constitutional: Patient is oriented to person, place, and time. Patient appears well-developed and well-nourished. Patient is active and cooperative. Non-toxic appearance. No distress. HENT:   Head: Normocephalic and atraumatic. Head is without abrasion and without laceration. Hair is normal.   Right Ear: External ear normal. No lacerations. No drainage, swelling . Left Ear: External ear normal. No lacerations. No drainage, swelling. Nose: Nose normal. No nose lacerations or nasal deformity. Eyes: Conjunctivae, EOM and lids are normal. Right eye exhibits no discharge. No foreign body present in the right eye. Left eye exhibits no discharge. No foreign body present in the left eye. No scleral icterus. Neck: Trachea normal and normal range of motion. No JVD present. Pulmonary/Chest: Effort normal. No accessory muscle usage or stridor. No apnea. No respiratory distress. Cardiovascular: Normal rate and no JVD. Abdominal: Normal appearance. Patient exhibits no distension. Musculoskeletal: Normal range of motion. Patient exhibits no edema. Neurological: Patient is alert and oriented to person, place, and time. Patient has normal strength. GCS eye subscore is 4. GCS verbal subscore is 5. GCS motor subscore is 6. Skin: Skin is warm and dry. No abrasion and no rash noted. Patient is not diaphoretic. No cyanosis or erythema. Psychiatric: Patient has a normal mood and affect. Speech is normal and behavior is normal. Cognition and memory are normal.       DATA:  CBC:   Lab Results   Component Value Date    WBC 10.5 05/05/2021    RBC 4.28 05/05/2021    HGB 13.6 05/05/2021    HCT 39.9 05/05/2021    MCV 93.3 05/05/2021    MCH 31.9 05/05/2021    MCHC 34.2 05/05/2021    RDW 15.9 05/05/2021     05/05/2021    MPV 9.4 05/05/2021     CMP:    Lab Results   Component Value Date     05/05/2021    K 4.0 05/05/2021    K 3.7 07/25/2019     05/05/2021    CO2 20 05/05/2021    BUN 11 05/05/2021    CREATININE 0.7 05/05/2021    GFRAA >60 05/05/2021    AGRATIO 1.3 05/05/2021    LABGLOM >60 05/05/2021    GLUCOSE 93 05/05/2021    PROT 7.4 05/05/2021    LABALBU 4.2 05/05/2021    CALCIUM 9.2 05/05/2021    BILITOT 0.8 05/05/2021    ALKPHOS 94 05/05/2021    AST 21 05/05/2021    ALT 25 05/05/2021       ASSESSMENT AND PLAN:    1. Patient is a 30 y.o. female with above specified procedure planned EGD with deep sedation  2. Procedure options, risks and benefits reviewed with patient. Patient expresses understanding.

## 2021-07-02 NOTE — ANESTHESIA PRE PROCEDURE
Department of Anesthesiology  Preprocedure Note       Name:  Rosio Ferguson   Age:  30 y.o.  :  1985                                          MRN:  [id number]         Date:  2021      Surgeon: Luis Stratton): Bhavna Charles MD    Procedure: Procedure(s):  EGD ESOPHAGOGASTRODUODENOSCOPY    Medications prior to admission:   Prior to Admission medications    Medication Sig Start Date End Date Taking? Authorizing Provider   Cholecalciferol (VITAMIN D) 50 MCG (2000) CAPS capsule Take by mouth 2 times daily   Yes Historical Provider, MD   Apremilast (OTEZLA) 30 MG TABS Take 30 mg by mouth 2 times daily    Yes Historical Provider, MD   FLUoxetine (PROZAC) 40 MG capsule Take 1 capsule by mouth daily 3/1/21  Yes Monserrat Martins [facility], DO   Multiple Vitamins-Minerals (THERAPEUTIC MULTIVITAMIN-MINERALS) tablet Take 1 tablet by mouth daily   Yes Historical Provider, MD   BIOTIN PO Take 1,000 mcg by mouth daily    Yes Historical Provider, MD   Melatonin 5 MG CAPS Take by mouth nightly    Yes Historical Provider, MD       Current medications:    No current facility-administered medications for this encounter. Allergies:     Allergies   Allergen Reactions    Penicillins Nausea And Vomiting       Problem List:    Patient Active Problem List   Diagnosis Code    Recurrent major depressive disorder, in full remission ([facility] [address] F33.42    Morbid obesity with BMI of 45.0-49.9, adult (HCC) E66.01, Z68.42    Chronic GERD K21.9    Snoring R06.83    Mixed hyperlipidemia E78.2    Vitamin D deficiency E55.9    Observed sleep apnea G47.30    Other fatigue R53.83       Past Medical History:        Diagnosis Date    Cataract     Depression     Genital herpes     one outbreak in     Kidney stone     Lung nodule     Pityriasis versicolor     Psoriasis        Past Surgical History:        Procedure Laterality Date    EYE SURGERY      cataract Rt eye    INDUCED       KIDNEY STONE SURGERY         Social History:    Social History     Tobacco Use    Smoking status: Former Smoker     Packs/day: 1.00     Years: 16.00     Pack years: 16.00     Types: Cigarettes     Quit date: 10/2020     Years since quittin.7    Smokeless tobacco: Never Used   Substance Use Topics    Alcohol use: Not Currently     Comment: very rare                                Counseling given: Not Answered      Vital Signs (Current):   Vitals:    21 0733   Weight: 288 lb (130.6 kg)   Height: 5' 6\" (1.676 m)                                              BP Readings from Last 3 Encounters:   21 110/60   21 132/75   21 122/70       NPO Status: Time of last liquid consumption: 0000                        Time of last solid consumption: 0000                        Date of last liquid consumption: 21                        Date of last solid food consumption: 21    BMI:   Wt Readings from Last 3 Encounters:   21 288 lb (130.6 kg)   21 292 lb (132.5 kg)   21 (!) 305 lb (138.3 kg)     Body mass index is 46.48 kg/m². CBC:   Lab Results   Component Value Date    WBC 10.5 2021    RBC 4.28 2021    HGB 13.6 2021    HCT 39.9 2021    MCV 93.3 2021    RDW 15.9 2021     2021       CMP:   Lab Results   Component Value Date     2021    K 4.0 2021    K 3.7 2019     2021    CO2 20 2021    BUN 11 2021    CREATININE 0.7 2021    GFRAA >60 2021    AGRATIO 1.3 2021    LABGLOM >60 2021    GLUCOSE 93 2021    PROT 7.4 2021    CALCIUM 9.2 2021    BILITOT 0.8 2021    ALKPHOS 94 2021    AST 21 2021    ALT 25 2021       POC Tests: No results for input(s): POCGLU, POCNA, POCK, POCCL, POCBUN, POCHEMO, POCHCT in the last 72 hours.     Coags:   Lab Results   Component Value Date    PROTIME 12.0 2021    INR 1.03 2021       HCG (If Applicable):   Lab

## 2021-07-02 NOTE — ANESTHESIA POSTPROCEDURE EVALUATION
Department of Anesthesiology  Postprocedure Note    Patient: Nataly Mccullough  MRN: [id number]  YOB: 1985  Date of evaluation: 7/2/2021  Time:  9:35 AM     Procedure Summary     Date: 07/02/21 Room / Location: [address] [place] [address]    Anesthesia Start: 6795 Anesthesia Stop: 3002    Procedure: EGD BIOPSY (N/A Abdomen) Diagnosis: (Gastroesophageal reflux disease (GERD) K21.9)    Surgeons: Bettina Self MD Responsible Provider: Kleber Anna MD    Anesthesia Type: MAC ASA Status: 3          Anesthesia Type: MAC    Kerry Phase I:      Kerry Phase II: Kerry Score: 10    Last vitals: Reviewed and per EMR flowsheets.        Anesthesia Post Evaluation    Patient location during evaluation: PACU  Level of consciousness: awake  Airway patency: patent  Complications: no  Cardiovascular status: hemodynamically stable  Respiratory status: acceptable

## 2021-07-25 ASSESSMENT — ENCOUNTER SYMPTOMS
GASTROINTESTINAL NEGATIVE: 1
SHORTNESS OF BREATH: 0
EYES NEGATIVE: 1
ALLERGIC/IMMUNOLOGIC NEGATIVE: 1
COUGH: 0
RESPIRATORY NEGATIVE: 1

## 2021-07-25 NOTE — PROGRESS NOTES
Cholecalciferol (VITAMIN D) 50 MCG (2000 UT) CAPS capsule, Take by mouth 2 times daily, Disp: , Rfl:     omeprazole (PRILOSEC) 20 MG delayed release capsule, Take 1 capsule by mouth Daily, Disp: 30 capsule, Rfl: 3    Apremilast (OTEZLA) 30 MG TABS, Take 30 mg by mouth 2 times daily , Disp: , Rfl:     FLUoxetine (PROZAC) 40 MG capsule, Take 1 capsule by mouth daily, Disp: 90 capsule, Rfl: 3    Multiple Vitamins-Minerals (THERAPEUTIC MULTIVITAMIN-MINERALS) tablet, Take 1 tablet by mouth daily, Disp: , Rfl:     BIOTIN PO, Take 1,000 mcg by mouth daily , Disp: , Rfl:     Melatonin 5 MG CAPS, Take by mouth nightly , Disp: , Rfl:     Lab Results   Component Value Date    WBC 10.5 05/05/2021    RBC 4.28 05/05/2021    HGB 13.6 05/05/2021    HCT 39.9 05/05/2021    MCV 93.3 05/05/2021    MCH 31.9 05/05/2021    MCHC 34.2 05/05/2021    MPV 9.4 05/05/2021    NEUTOPHILPCT 70.3 05/05/2021    LYMPHOPCT 20.8 05/05/2021    MONOPCT 7.0 05/05/2021    EOSRELPCT 1.6 05/05/2021    BASOPCT 0.3 05/05/2021    NEUTROABS 7.3 05/05/2021    LYMPHSABS 2.2 05/05/2021    MONOSABS 0.7 05/05/2021    EOSABS 0.2 05/05/2021     Lab Results   Component Value Date     05/05/2021    K 4.0 05/05/2021    K 3.7 07/25/2019     05/05/2021    CO2 20 05/05/2021    ANIONGAP 15 05/05/2021    GLUCOSE 93 05/05/2021    BUN 11 05/05/2021    CREATININE 0.7 05/05/2021    LABGLOM >60 05/05/2021    GFRAA >60 05/05/2021    CALCIUM 9.2 05/05/2021    PROT 7.4 05/05/2021    LABALBU 4.2 05/05/2021    AGRATIO 1.3 05/05/2021    BILITOT 0.8 05/05/2021    ALKPHOS 94 05/05/2021    ALT 25 05/05/2021    AST 21 05/05/2021    GLOB 3.2 05/05/2021     Lab Results   Component Value Date    CHOL 192 05/05/2021    TRIG 247 05/05/2021    HDL 35 05/05/2021    LDLCALC 108 05/05/2021    LABVLDL 49 05/05/2021     Lab Results   Component Value Date    TSHREFLEX 2.15 05/05/2021     Lab Results   Component Value Date    IRON 69 05/05/2021    TIBC 331 05/05/2021    LABIRON 21 05/05/2021     Lab Results   Component Value Date    YRTZATTM05 445 05/05/2021    FOLATE 17.23 05/05/2021     Lab Results   Component Value Date    VITD25 23.9 05/05/2021     Lab Results   Component Value Date    LABA1C 5.6 05/05/2021    .0 05/05/2021       Review of Systems   Constitutional: Negative. Negative for chills, fatigue and fever. HENT: Negative. Eyes: Negative. Respiratory: Negative. Negative for cough and shortness of breath. Cardiovascular: Negative. Gastrointestinal: Negative. Endocrine: Negative. Genitourinary: Negative. Musculoskeletal: Negative. Skin: Negative. Allergic/Immunologic: Negative. Neurological: Negative. Hematological: Negative. Psychiatric/Behavioral: Negative. Objective:   Physical Exam  Vitals reviewed. Constitutional:       Appearance: She is well-developed. HENT:      Head: Normocephalic and atraumatic. Eyes:      Conjunctiva/sclera: Conjunctivae normal.      Pupils: Pupils are equal, round, and reactive to light. Cardiovascular:      Rate and Rhythm: Normal rate. Pulmonary:      Effort: Pulmonary effort is normal.   Abdominal:      Palpations: Abdomen is soft. Musculoskeletal:         General: Normal range of motion. Cervical back: Normal range of motion and neck supple. Skin:     General: Skin is warm and dry. Neurological:      Mental Status: She is alert and oriented to person, place, and time. Psychiatric:         Behavior: Behavior normal.         Thought Content: Thought content normal.         Judgment: Judgment normal.       Assessment and Plan:   Patient is here for their 4th presurgery visit for sleeve, down 1 lbs. The patient's current Body mass index is 46.97 kg/m². (7/26/21). She is making dietary and behavior modifications. She did meet with the registered dietitian for continued follow up. I agree with recommendations and plan. She is exercising with walking 4-5 days per week.  Encouraged continued physical activity. Patient received instruction that it is recommended to avoid pregnancy following bariatric surgery for at least 2 years to allow them to have stable weight loss and to help avoid increased risk of vitamin deficiencies and malnutrition. The patient was encouraged to discuss possible contraceptive methods with their PCP or OBGYN. Patient counseled on the avoidance of tobacco/nicotine, alcohol and illicit drug abuse. Spoke with patient regarding recent EGD biopsy results which showed Gastric antrum, biopsy: Antrum type mucosa with mild chronic inactive gastritis. No Helicobacter-like microorganisms identified on H&E stain. Negative for intestinal metaplasia, dysplasia or malignancy. Patient will continue her Prilosec as prescribed. Discussed preop work up which still needs sleep clearance (Received CPAP almost a week ago and had been wearing. Patient to call [facility] on 8/20/2021 and have them fax compliance report to our office.), smoking cessation (Marijuana cessation. Explained to patient that they must be nicotine free for at least 6 weeks prior to scheduling surgery) and urine drug screen (will order next visit). We will see her back in 1 month for continued follow up. Hyperlipidemia:   [x] Continue to make dietary and lifestyle modifications per our recommendations. [] Continue to follow up with their PCP for medication management and monitoring. Obstructive Sleep Apnea:   [x] Continue to make dietary and lifestyle modifications per our recommendations. [x] Reviewed the importance of wearing your CPAP/BIPAP. [x] Continue to follow up with their sleep medicine provider for CPAP/BIPAP management and monitoring. Chronic GERD:   [x] Continue to make dietary and lifestyle modifications per our recommendations. [x] Continue PPI (Prilosec). [] Continue H2 Blocker  [] Wean PPI. Take every other day for two weeks.  If no issues with heartburn/reflux you may decrease to every third day for two weeks. If no issues with heartburn/reflux you may stop the Prilosec. Recommend that you get OTC Pepcid to take should you have occasional heartburn/reflux. Obesity:  [x] Continue to make dietary and lifestyle modifications per our recommendations. [x] Plan for Future laparoscopic sleeve gastrectomy. [x] Return for follow-up next month. Total encounter time: 30 minutes, including any number of the following: Bariatric Preoperative work up/protocols, review of labs, imaging, provider notes, outside hospital records, performing examination/evaluation, counseling patient and/or family, ordering medications/tests, placing referrals and communication with referring physicians, coordination of care; discussing exercise and physical activity; discussing dietary plan/recall with the patient as well with registered dietitian and documentation in the EHR. Of note, the above was done during same day of the actual patient encounter.

## 2021-07-25 NOTE — PATIENT INSTRUCTIONS
Goals in preparing for bariatric surgery  You should be giving up all beverages that have carbonation, sugar, and caffeine (Refer to the approved liquids list provided at initial visit).  You should be drinking 64 ounces of low calorie (5 calories or less per serving) fluids per day. Suggestions include:  o Water (you may add fresh lemon or lime)  o Crystal Light  o Petey Liquid Water Enhancer  o Propel Zero  o Powerade Zero/Gatorade Zero  o Isopure  o [id number]  o SOBE Lifewater Zero  o Vitamin Water Zero  o Sugar Free Domingo-Aid  You should be eating 4-6 times per day.  Three small meals plus 1-2 snacks per day is your goal. This balances your calories and nutrients evenly throughout the day and helps to boost your metabolism. Refer to the snack list provided at your initial visit. Aim for a protein at every snack, plus a fruit, vegetable or starch. You should be eating protein at every meal and snack.  Protein is typically found in animal sources, i.e. chicken, lean beef, lean pork, fish, seafood and eggs. It is also found in low-fat dairy sources such as skim or 1% milk, low-fat yogurt, low-fat cheese, and low-fat cottage cheese. Plant based sources of protein include peanut butter, beans, and soy. You should be utilizing the 9-inch plate method.  Eating on a smaller plate will help you control portion size, but what you put on your plate counts also. Make ¼ of your plate lean protein, ¼ carbohydrate (fruit, grain or starchy vegetables) and ½ the plate non-starchy vegetables. You should eliminate caffeine.  Caffeine is dehydrating. After surgery, it's very important to stay hydrated. Giving up caffeine before surgery will help you focus on the changes necessary to be successful after surgery. There are many decaffeinated coffee and tea products available in grocery stores. You should eliminate alcohol.    You must abstain from alcohol prior to surgery and for at least the first 6-9 months after surgery. Although you may have alcohol in the future it is important to understand, as a bariatric surgery patient, there is a risk of it negatively affecting your weight loss goals and it can pose a risk for addiction. You should be reducing added fat and sugar in your diet.  Frying foods adds too much fat and calories, but you could use an air fryer as it requires significantly less oil. Baking, broiling, or grilling meats add flavor without unhealthy fats. Using cooking oil spray and spray butter products are also healthy options that will aid in your weight loss. Foods high in added sugars are often also high in calories and low in nutrients. If you are a smoker or use e-cigarettes/vaping, you must eliminate.  You must be nicotine free and/or not vaping for at least 8 weeks before your surgery can be scheduled. You will be screened for nicotine through lab work. Eating habits after surgery need to be a long-term change. Eating habits are often so ingrained that it can be difficult to change. It is important to practice new eating habits prior to surgery to mentally prepare yourself for the challenge ahead. Also, remember that overall health, age, and genetics make each person's weight loss progress different. Do not compare your progress (pre- or post-operatively), the amount you eat, or your exercise to other patients. In addition, it is the responsibility of the patient to schedule and follow up on labs and tests completed during the pre-surgical period. Results will be reviewed at each visit. **IT IS IMPORTANT TO KNOW THAT YOU MUST COMPLETE ALL REQUIRED DIETARY CHANGES, TESTS, CLEARANCES AND ACTIVITIES BEFORE A SURGERY DATE CAN BE GIVEN. IF YOU HAVE NOT MET ANY OF THESE REQUIREMENTS THEN YOU WILL NOT BE GIVEN A SURGERY DATE UNTIL THEY HAVE BEEN MET TO OUR SATISFACTION.  THIS IS NOT ONLY DONE TO HELP YOU BE SUCCESSFUL AFTER SURGERY, BUT TO BE SAFE AS WELL.**    Patient received dietary handouts and education. Call [facility] to have them fax us a CPAP compliance report on August 20, 2021.   Fax number [phone]

## 2021-07-26 ENCOUNTER — OFFICE VISIT (OUTPATIENT)
Dept: BARIATRICS/WEIGHT MGMT | Age: 36
End: 2021-07-26
Payer: COMMERCIAL

## 2021-07-26 VITALS
BODY MASS INDEX: 46.77 KG/M2 | WEIGHT: 291 LBS | HEART RATE: 88 BPM | SYSTOLIC BLOOD PRESSURE: 110 MMHG | DIASTOLIC BLOOD PRESSURE: 70 MMHG | OXYGEN SATURATION: 99 % | HEIGHT: 66 IN | RESPIRATION RATE: 16 BRPM

## 2021-07-26 DIAGNOSIS — K21.9 CHRONIC GERD: Primary | ICD-10-CM

## 2021-07-26 DIAGNOSIS — G47.30 OBSERVED SLEEP APNEA: ICD-10-CM

## 2021-07-26 DIAGNOSIS — E66.01 MORBID OBESITY WITH BMI OF 45.0-49.9, ADULT (HCC): ICD-10-CM

## 2021-07-26 DIAGNOSIS — E78.2 MIXED HYPERLIPIDEMIA: ICD-10-CM

## 2021-07-26 PROCEDURE — 99214 OFFICE O/P EST MOD 30 MIN: CPT | Performed by: NURSE PRACTITIONER

## 2021-07-26 RX ORDER — OMEPRAZOLE 20 MG/1
20 CAPSULE, DELAYED RELEASE ORAL DAILY
Qty: 30 CAPSULE | Refills: 3 | Status: SHIPPED | OUTPATIENT
Start: 2021-07-26 | End: 2022-04-19

## 2021-07-26 NOTE — PROGRESS NOTES
Janay Fong lost 1 lbs over past month. Pt just went on vacation with her family where they each participated in meal preparation. Is pt eating at least 4 times everyday? Yes    Is pt eating a lean protein source with all meals and snacks? Mostly but not all    Lunchmeat OR pickles OR tacos OR skyline OR grilled chicken OR salad sometimes with grilled chicken OR greek yogurt with raisin bran crunch (measured portions out) with 1/2 cup skim milk OR cheese sticks/babybel cheese OR salmon/tilapia/grilled chicken sweet potato with green beans/brussles sprouts    Has pt decreased their portions using the plate method? Yes    Is pt choosing low fat/sugar free options? Has been doing better with this, did have some skyline when on vacation    Is pt drinking at least 64 oz of clear liquids everyday? Yes    Has pt stopped drinking carbonation, caffeinated, and sugar sweetened beverages? Yes - SF popsicle, eliminated coffee, drinking decaf hot tea with lemon (no honey), water     Has pt sampled Unjury and/or Nectar protein? Yes    Participating in intentional exercise?  Walking more - 2-3 miles per week - walks 4-5 days/week    Plan/Recommendations:   Include protein consistently with meals/snacks   Discussed expectations after surgery regarding higher fat food choices (skyline)   Discussed recipe modifications for favorite foods and bariatric cookbooks   Continue with exercise     Handouts: none     Manish Reed RD, LD

## 2021-08-10 ASSESSMENT — ENCOUNTER SYMPTOMS
COUGH: 0
EYES NEGATIVE: 1
ALLERGIC/IMMUNOLOGIC NEGATIVE: 1
RESPIRATORY NEGATIVE: 1
GASTROINTESTINAL NEGATIVE: 1
SHORTNESS OF BREATH: 0

## 2021-08-10 NOTE — PROGRESS NOTES
[facility]) Physicians   [facility]    Subjective:      Patient ID: Roni Dorman is a 29 y.o. female    HPI    The patient is here for their bariatric surgery presurgical visit for future weight loss. She has made several attempts at weight loss in the past without success and now wishes to pursue bariatric surgery. She is working to change her dietary behaviors and lose weight to improve comorbid conditions such as hyperlipidemia, obstructive sleep apnea and GERD. Roni Dorman is a 29 y.o. female with Body mass index is 47.29 kg/m². Past Medical History:   Diagnosis Date    Cataract     Depression     Genital herpes     one outbreak in     Kidney stone     Lung nodule     Pityriasis versicolor     Psoriasis      Past Surgical History:   Procedure Laterality Date    EYE SURGERY      cataract Rt eye    INDUCED       KIDNEY STONE SURGERY      UPPER GASTROINTESTINAL ENDOSCOPY N/A 2021    EGD BIOPSY performed by Tamica Cho MD at [id number] [employer] ENDOSCOPY     Family History   Problem Relation Age of Onset    Stroke Father 62        1    Diabetes Sister 23        type 1     Diabetes Paternal Grandmother      Social History     Tobacco Use    Smoking status: Former Smoker     Packs/day: 1.00     Years: 16.00     Pack years: 16.00     Types: Cigarettes     Quit date: 10/2020     Years since quittin.8    Smokeless tobacco: Never Used   Substance Use Topics    Alcohol use: Not Currently     Comment: very rare     I counseled the patient on the importance of not smoking and risks of ETOH. Allergies   Allergen Reactions    Penicillins Nausea And Vomiting     Vitals:    21 0824   BP: 122/60   Pulse: 90   Resp: 16   Weight: 293 lb (132.9 kg)   Height: 5' 6\" (1.676 m)     Body mass index is 47.29 kg/m².     Current Outpatient Medications:     Norgestimate-Eth Estradiol (SPRINTEC 28 PO), Take 1 tablet by mouth daily, Disp: , Rfl:     omeprazole (PRILOSEC) 20 MG delayed release capsule, Take 1 capsule by mouth Daily, Disp: 30 capsule, Rfl: 3    Cholecalciferol (VITAMIN D) 50 MCG (2000 UT) CAPS capsule, Take by mouth 2 times daily, Disp: , Rfl:     Apremilast (OTEZLA) 30 MG TABS, Take 30 mg by mouth 2 times daily , Disp: , Rfl:     FLUoxetine (PROZAC) 40 MG capsule, Take 1 capsule by mouth daily, Disp: 90 capsule, Rfl: 3    Multiple Vitamins-Minerals (THERAPEUTIC MULTIVITAMIN-MINERALS) tablet, Take 1 tablet by mouth daily, Disp: , Rfl:     BIOTIN PO, Take 1,000 mcg by mouth daily , Disp: , Rfl:     Melatonin 5 MG CAPS, Take by mouth nightly , Disp: , Rfl:     Lab Results   Component Value Date    WBC 10.5 05/05/2021    RBC 4.28 05/05/2021    HGB 13.6 05/05/2021    HCT 39.9 05/05/2021    MCV 93.3 05/05/2021    MCH 31.9 05/05/2021    MCHC 34.2 05/05/2021    MPV 9.4 05/05/2021    NEUTOPHILPCT 70.3 05/05/2021    LYMPHOPCT 20.8 05/05/2021    MONOPCT 7.0 05/05/2021    EOSRELPCT 1.6 05/05/2021    BASOPCT 0.3 05/05/2021    NEUTROABS 7.3 05/05/2021    LYMPHSABS 2.2 05/05/2021    MONOSABS 0.7 05/05/2021    EOSABS 0.2 05/05/2021     Lab Results   Component Value Date     05/05/2021    K 4.0 05/05/2021    K 3.7 07/25/2019     05/05/2021    CO2 20 05/05/2021    ANIONGAP 15 05/05/2021    GLUCOSE 93 05/05/2021    BUN 11 05/05/2021    CREATININE 0.7 05/05/2021    LABGLOM >60 05/05/2021    GFRAA >60 05/05/2021    CALCIUM 9.2 05/05/2021    PROT 7.4 05/05/2021    LABALBU 4.2 05/05/2021    AGRATIO 1.3 05/05/2021    BILITOT 0.8 05/05/2021    ALKPHOS 94 05/05/2021    ALT 25 05/05/2021    AST 21 05/05/2021    GLOB 3.2 05/05/2021     Lab Results   Component Value Date    CHOL 192 05/05/2021    TRIG 247 05/05/2021    HDL 35 05/05/2021    LDLCALC 108 05/05/2021    LABVLDL 49 05/05/2021     Lab Results   Component Value Date    TSHREFLEX 2.15 05/05/2021     Lab Results   Component Value Date    IRON 69 05/05/2021    TIBC 331 05/05/2021    LABIRON 21 05/05/2021 Lab Results   Component Value Date    FNRWMOJZ40 946 05/05/2021    FOLATE 17.23 05/05/2021     Lab Results   Component Value Date    VITD25 23.9 05/05/2021     Lab Results   Component Value Date    LABA1C 5.6 05/05/2021    .0 05/05/2021       Review of Systems   Constitutional: Negative. Negative for chills, fatigue and fever. HENT: Negative. Eyes: Negative. Respiratory: Negative. Negative for cough and shortness of breath. Cardiovascular: Negative. Gastrointestinal: Negative. Endocrine: Negative. Genitourinary: Negative. Musculoskeletal: Negative. Skin: Negative. Allergic/Immunologic: Negative. Neurological: Negative. Hematological: Negative. Psychiatric/Behavioral: Negative. Objective:   Physical Exam  Vitals reviewed. Constitutional:       Appearance: She is well-developed. HENT:      Head: Normocephalic and atraumatic. Eyes:      Conjunctiva/sclera: Conjunctivae normal.      Pupils: Pupils are equal, round, and reactive to light. Pulmonary:      Effort: Pulmonary effort is normal.   Abdominal:      Palpations: Abdomen is soft. Musculoskeletal:         General: Normal range of motion. Cervical back: Normal range of motion and neck supple. Skin:     General: Skin is warm and dry. Neurological:      Mental Status: She is alert and oriented to person, place, and time. Psychiatric:         Behavior: Behavior normal.         Thought Content: Thought content normal.         Judgment: Judgment normal.       Assessment and Plan:   Patient is here for their 5th presurgery visit for sleeve, up 2 lbs. The patient's current Body mass index is 47.29 kg/m². (8/23/21). She is making dietary and behavior modifications. Needs to make sure she is getting protein with her snacks. She did meet with the registered dietitian for continued follow up. I agree with recommendations and plan. She is exercising with walking 3-4 days per week.  Encouraged continued physical activity. Patient received instruction that it is recommended to avoid pregnancy following bariatric surgery for at least 2 years to allow them to have stable weight loss and to help avoid increased risk of vitamin deficiencies and malnutrition. The patient was encouraged to discuss possible contraceptive methods with their PCP or OBGYN. Patient counseled on the avoidance of tobacco/nicotine, alcohol and illicit drug abuse. Discussed preop work up which still needs sleep clearance (she will call Feli Daley as we did not receive her compliance report), marijuana cessation (explained to patient that they must be marijuana free for at least 6 weeks prior to scheduling surgery) and urine drug screen (ordered today). Dietitian and I have spoken and agree the patient is ready for a surgical date. Have cautioned patient about weight gain going forward and maintaining healthy behaviors as to not risk surgery being cancelled or denied by insurance. Medications preoperatively and postoperatively handout provided and reviewed. We will see her back in 1 month for continued follow up. Hyperlipidemia:   [x] Continue to make dietary and lifestyle modifications per our recommendations. [] Continue to follow up with their PCP for medication management and monitoring. Obstructive Sleep Apnea:   [x] Continue to make dietary and lifestyle modifications per our recommendations. [x] Reviewed the importance of wearing your CPAP/BIPAP. [x] Continue to follow up with their sleep medicine provider for CPAP/BIPAP management and monitoring. Chronic GERD:   [x] Continue to make dietary and lifestyle modifications per our recommendations. [x] Continue PPI (Prilosec). [] Continue H2 Blocker  [] Wean PPI. Take every other day for two weeks. If no issues with heartburn/reflux you may decrease to every third day for two weeks. If no issues with heartburn/reflux you may stop the Prilosec.  Recommend that you get OTC Pepcid to take should you have occasional heartburn/reflux. Obesity:  [x] Continue to make dietary and lifestyle modifications per our recommendations. [x] Plan for Future laparoscopic sleeve gastrectomy. [x] Return for follow-up next month. Total encounter time: 35 minutes, including any number of the following: Bariatric Preoperative work up/protocols, review of labs, imaging, provider notes, outside hospital records, performing examination/evaluation, counseling patient and/or family, ordering medications/tests, placing referrals and communication with referring physicians, coordination of care; discussing exercise and physical activity; discussing dietary plan/recall with the patient as well with registered dietitian and documentation in the EHR. Of note, the above was done during same day of the actual patient encounter.

## 2021-08-10 NOTE — PATIENT INSTRUCTIONS
Goals in preparing for bariatric surgery  You should be giving up all beverages that have carbonation, sugar, and caffeine (Refer to the approved liquids list provided at initial visit).  You should be drinking 64 ounces of low calorie (5 calories or less per serving) fluids per day. Suggestions include:  o Water (you may add fresh lemon or lime)  o Crystal Light  o Petey Liquid Water Enhancer  o Propel Zero  o Powerade Zero/Gatorade Zero  o Isopure  o Hfuuk4D  o SOBE Lifewater Zero  o Vitamin Water Zero  o Sugar Free Domingo-Aid  You should be eating 4-6 times per day.  Three small meals plus 1-2 snacks per day is your goal. This balances your calories and nutrients evenly throughout the day and helps to boost your metabolism. Refer to the snack list provided at your initial visit. Aim for a protein at every snack, plus a fruit, vegetable or starch. You should be eating protein at every meal and snack.  Protein is typically found in animal sources, i.e. chicken, lean beef, lean pork, fish, seafood and eggs. It is also found in low-fat dairy sources such as skim or 1% milk, low-fat yogurt, low-fat cheese, and low-fat cottage cheese. Plant based sources of protein include peanut butter, beans, and soy. You should be utilizing the 9-inch plate method.  Eating on a smaller plate will help you control portion size, but what you put on your plate counts also. Make ¼ of your plate lean protein, ¼ carbohydrate (fruit, grain or starchy vegetables) and ½ the plate non-starchy vegetables. You should eliminate caffeine.  Caffeine is dehydrating. After surgery, it's very important to stay hydrated. Giving up caffeine before surgery will help you focus on the changes necessary to be successful after surgery. There are many decaffeinated coffee and tea products available in grocery stores. You should eliminate alcohol.    You must abstain from alcohol prior to surgery and for at least the first 6-9 months after handouts and education.

## 2021-08-23 ENCOUNTER — OFFICE VISIT (OUTPATIENT)
Dept: BARIATRICS/WEIGHT MGMT | Age: 36
End: 2021-08-23
Payer: COMMERCIAL

## 2021-08-23 VITALS
SYSTOLIC BLOOD PRESSURE: 122 MMHG | HEIGHT: 66 IN | DIASTOLIC BLOOD PRESSURE: 60 MMHG | BODY MASS INDEX: 47.09 KG/M2 | HEART RATE: 90 BPM | WEIGHT: 293 LBS | RESPIRATION RATE: 16 BRPM

## 2021-08-23 DIAGNOSIS — G47.30 OBSERVED SLEEP APNEA: ICD-10-CM

## 2021-08-23 DIAGNOSIS — Z01.818 PREOP TESTING: ICD-10-CM

## 2021-08-23 DIAGNOSIS — K21.9 CHRONIC GERD: Primary | ICD-10-CM

## 2021-08-23 DIAGNOSIS — E78.2 MIXED HYPERLIPIDEMIA: ICD-10-CM

## 2021-08-23 DIAGNOSIS — E66.01 MORBID OBESITY WITH BMI OF 45.0-49.9, ADULT (HCC): ICD-10-CM

## 2021-08-23 PROCEDURE — 99214 OFFICE O/P EST MOD 30 MIN: CPT | Performed by: NURSE PRACTITIONER

## 2021-08-23 NOTE — PROGRESS NOTES
Janay Fong gained 2 lbs over past month. Is pt eating at least 4 times everyday? Yes    Is pt eating a lean protein source with all meals and snacks? Mostly    Breakfast - plain greek yogurt OR multigrain bread sometimes with fruit OR raisin bran crunch with 1% milk  Lunch - tuna/grilled chicken/turkey OR 1/2 sandwich OR salad   Snack - trail mix OR banana/apple OR string cheese   Dinner - Grilled chicken/salmon with brown rice and broccoli/brussels sprouts    Has pt decreased their portions using the plate method? Yes     Is pt choosing low fat/sugar free options? Yes, except alcohol     Is pt drinking at least 64 oz of clear liquids everyday? Yes    Has pt stopped drinking carbonation, caffeinated, and sugar sweetened beverages? No - Water, alcohol when on vacation, decaf hot tea, unsweetened tea     Has pt sampled Unjury and/or Nectar protein? Yes    Participating in intentional exercise?  Walking when in [place], walking on lunch hour at work (weather permitting)     Plan/Recommendations:   Include protein consistently with meals/snacks   Eliminate alcohol     Handouts: none     Bertin Cha, SONNY, LD

## 2021-08-23 NOTE — Clinical Note
Lyndsay Villegas,  Patient is ready for a surgery date, but we still need urine drug screen to check marijuana negative and waiting on compliance from  Adriane. Based on she has not smoked any marijuana since August 1st she will complete urine drug screen the day she comes to preoperative class which I think should be September 14th as that will be at least 6 weeks since last use. Then can we schedule her surgery the week of October 11th. If you have any questions, please let me know.   Thank you,  CELESTINO GregorioC

## 2021-09-02 ASSESSMENT — ENCOUNTER SYMPTOMS
RESPIRATORY NEGATIVE: 1
GASTROINTESTINAL NEGATIVE: 1
ALLERGIC/IMMUNOLOGIC NEGATIVE: 1
COUGH: 0
SHORTNESS OF BREATH: 0
EYES NEGATIVE: 1

## 2021-09-02 NOTE — PROGRESS NOTES
[facility]) Physicians   [facility] [employer]    Subjective:      Patient ID: Norma Calvin is a 29 y.o. female    HPI    The patient is here for their bariatric surgery preoperative education group visit. She has made several attempts at weight loss in the past without success and now has been scheduled to have bariatric surgery on 2021 for future weight loss. She is working to change her dietary behaviors and lose weight to improve comorbid conditions such as hyperlipidemia, obstructive sleep apnea and GERD. Norma Calvin is a very pleasant 29 y.o. female with Body mass index is 47.97 kg/m². Past Medical History:   Diagnosis Date    Cataract     Depression     Genital herpes     one outbreak in     Kidney stone     Lung nodule     Pityriasis versicolor     Psoriasis      Past Surgical History:   Procedure Laterality Date    EYE SURGERY      cataract Rt eye    INDUCED       KIDNEY STONE SURGERY      UPPER GASTROINTESTINAL ENDOSCOPY N/A 2021    EGD BIOPSY performed by Nathaniel Carty MD at [address] [facility] ENDOSCOPY     Family History   Problem Relation Age of Onset    Stroke Father 62        1    Diabetes Sister 23        type 1     Diabetes Paternal Grandmother      Social History     Tobacco Use    Smoking status: Former Smoker     Packs/day: 1.00     Years: 16.00     Pack years: 16.00     Types: Cigarettes     Quit date: 10/2020     Years since quittin.9    Smokeless tobacco: Never Used   Substance Use Topics    Alcohol use: Not Currently     Comment: very rare     I counseled the patient on the importance of not smoking and risks of ETOH. Allergies   Allergen Reactions    Penicillins Nausea And Vomiting     Vitals:    21 1432   Weight: 297 lb 3.2 oz (134.8 kg)   Height: 5' 6\" (1.676 m)     Body mass index is 47.97 kg/m².     Current Outpatient Medications:     traZODone (DESYREL) 50 MG tablet, Take 1 tablet by mouth nightly, Disp: 30 tablet, Rfl: 0    Norgestimate-Eth Estradiol (SPRINTEC 28 PO), Take 1 tablet by mouth daily, Disp: , Rfl:     omeprazole (PRILOSEC) 20 MG delayed release capsule, Take 1 capsule by mouth Daily, Disp: 30 capsule, Rfl: 3    Cholecalciferol (VITAMIN D) 50 MCG (2000 UT) CAPS capsule, Take by mouth 2 times daily, Disp: , Rfl:     Apremilast (OTEZLA) 30 MG TABS, Take 30 mg by mouth 2 times daily , Disp: , Rfl:     FLUoxetine (PROZAC) 40 MG capsule, Take 1 capsule by mouth daily, Disp: 90 capsule, Rfl: 3    Multiple Vitamins-Minerals (THERAPEUTIC MULTIVITAMIN-MINERALS) tablet, Take 1 tablet by mouth daily, Disp: , Rfl:     BIOTIN PO, Take 1,000 mcg by mouth daily , Disp: , Rfl:     Melatonin 5 MG CAPS, Take by mouth nightly , Disp: , Rfl:     Lab Results   Component Value Date    WBC 10.5 05/05/2021    RBC 4.28 05/05/2021    HGB 13.6 05/05/2021    HCT 39.9 05/05/2021    MCV 93.3 05/05/2021    MCH 31.9 05/05/2021    MCHC 34.2 05/05/2021    MPV 9.4 05/05/2021    NEUTOPHILPCT 70.3 05/05/2021    LYMPHOPCT 20.8 05/05/2021    MONOPCT 7.0 05/05/2021    EOSRELPCT 1.6 05/05/2021    BASOPCT 0.3 05/05/2021    NEUTROABS 7.3 05/05/2021    LYMPHSABS 2.2 05/05/2021    MONOSABS 0.7 05/05/2021    EOSABS 0.2 05/05/2021     Lab Results   Component Value Date     05/05/2021    K 4.0 05/05/2021    K 3.7 07/25/2019     05/05/2021    CO2 20 05/05/2021    ANIONGAP 15 05/05/2021    GLUCOSE 93 05/05/2021    BUN 11 05/05/2021    CREATININE 0.7 05/05/2021    LABGLOM >60 05/05/2021    GFRAA >60 05/05/2021    CALCIUM 9.2 05/05/2021    PROT 7.4 05/05/2021    LABALBU 4.2 05/05/2021    AGRATIO 1.3 05/05/2021    BILITOT 0.8 05/05/2021    ALKPHOS 94 05/05/2021    ALT 25 05/05/2021    AST 21 05/05/2021    GLOB 3.2 05/05/2021     Lab Results   Component Value Date    CHOL 192 05/05/2021    TRIG 247 05/05/2021    HDL 35 05/05/2021    LDLCALC 108 05/05/2021    LABVLDL 49 05/05/2021     Lab Results   Component Value Date    TSHREFLEX 2.15 05/05/2021     Lab Results   Component Value Date    IRON 69 05/05/2021    TIBC 331 05/05/2021    LABIRON 21 05/05/2021     Lab Results   Component Value Date    JJTBGMZL42 445 05/05/2021    FOLATE 17.23 05/05/2021     Lab Results   Component Value Date    VITD25 23.9 05/05/2021     Lab Results   Component Value Date    LABA1C 5.6 05/05/2021    .0 05/05/2021       Review of Systems   Constitutional: Negative. Negative for chills, fatigue and fever. HENT: Negative. Eyes: Negative. Respiratory: Negative. Negative for cough and shortness of breath. Cardiovascular: Negative. Gastrointestinal: Negative. Endocrine: Negative. Genitourinary: Negative. Musculoskeletal: Negative. Skin: Negative. Allergic/Immunologic: Negative. Neurological: Negative. Hematological: Negative. Psychiatric/Behavioral: Negative. Objective:     Physical Exam  Vitals reviewed. Constitutional:       Appearance: She is well-developed. HENT:      Head: Normocephalic and atraumatic. Eyes:      Conjunctiva/sclera: Conjunctivae normal.      Pupils: Pupils are equal, round, and reactive to light. Pulmonary:      Effort: Pulmonary effort is normal.   Abdominal:      Palpations: Abdomen is soft. Musculoskeletal:         General: Normal range of motion. Cervical back: Normal range of motion and neck supple. Skin:     General: Skin is warm and dry. Neurological:      Mental Status: She is alert and oriented to person, place, and time. Psychiatric:         Behavior: Behavior normal.         Thought Content: Thought content normal.         Judgment: Judgment normal.       Assessment and Plan:   Patient is here for their preoperative education group visit for sleeve gastrectomy. The patient is up 4.2 lbs today. The patient's current Body mass index is 47.97 kg/m². (9/14/21). She is making good dietary and behavior modifications and is considered to be a good surgical candidate.      Patient has a diagnosis of hyperlipidemia. Discussed the benefits of weight loss and dietary changes on lipids and cholesterol. Discussed the fact that they will be required to crush or open their medications for the first two weeks after surgery and reviewed those medications that can not be crushed. Patient has a diagnosis of obstructive sleep apnea and uses a CPAP for sleep. Discussed that weight loss can assist with improving sleep apnea symptoms. Explained to patient to make sure they bring their CPAP with them to the hospital for their surgery. Patient has a diagnosis of chronic GERD and takes a PPI. Discussed the benefits of weight loss and dietary changes on acid reflux. However, they will most likely need to continue their medication short term after surgery as they adjust to the new diet. Discussed the fact that they will be required to crush or open their medications for the first two weeks after surgery and reviewed those medications that can not be crushed. Patient received instruction that it is recommended to avoid pregnancy following bariatric surgery for at least 2 years to allow them to have stable weight loss and to help avoid increased risk of vitamin deficiencies and malnutrition. The patient was encouraged to discuss possible contraceptive methods with their PCP or OBGYN. Patient received instructions from the registered dietitian in reference to the two week preoperative diet and the four phases of their postoperative diet. In addition I reviewed these instructions and stressed the importance of following these recommendations for their safety.      Patient completed the preoperative class where they were provided with education related to their bariatric surgery, common surgical complications, medications preoperatively & postoperatively, special concerns related to bariatric surgery postoperatively, vitamin supplementation, patient agreement, PAT & scheduling, hospital course, wellness discovery program and what to do in the case of an emergency postoperatively. The dietitians reviewed all preoperative and postoperative diet instructions. Patient was given the opportunity to ask questions during the group visit and these questions were answered by myself and/or the dietitian. I spent a total of 40 minutes on the day of the visit and over half of that time was spent counseling the patient on proper dietary behaviors, exercise and surgery protocols.

## 2021-09-09 ENCOUNTER — VIRTUAL VISIT (OUTPATIENT)
Dept: PULMONOLOGY | Age: 36
End: 2021-09-09
Payer: COMMERCIAL

## 2021-09-09 DIAGNOSIS — E66.01 MORBID OBESITY (HCC): ICD-10-CM

## 2021-09-09 DIAGNOSIS — G47.33 SEVERE OBSTRUCTIVE SLEEP APNEA: Primary | ICD-10-CM

## 2021-09-09 DIAGNOSIS — R53.83 OTHER FATIGUE: ICD-10-CM

## 2021-09-09 DIAGNOSIS — G47.10 HYPERSOMNIA: ICD-10-CM

## 2021-09-09 PROCEDURE — 99214 OFFICE O/P EST MOD 30 MIN: CPT | Performed by: INTERNAL MEDICINE

## 2021-09-09 ASSESSMENT — SLEEP AND FATIGUE QUESTIONNAIRES
HOW LIKELY ARE YOU TO NOD OFF OR FALL ASLEEP WHILE SITTING QUIETLY AFTER LUNCH WITHOUT ALCOHOL: 0
HOW LIKELY ARE YOU TO NOD OFF OR FALL ASLEEP IN A CAR, WHILE STOPPED FOR A FEW MINUTES IN TRAFFIC: 0
HOW LIKELY ARE YOU TO NOD OFF OR FALL ASLEEP WHEN YOU ARE A PASSENGER IN A CAR FOR AN HOUR WITHOUT A BREAK: 0
HOW LIKELY ARE YOU TO NOD OFF OR FALL ASLEEP WHILE WATCHING TV: 0
HOW LIKELY ARE YOU TO NOD OFF OR FALL ASLEEP WHILE SITTING INACTIVE IN A PUBLIC PLACE: 0
ESS TOTAL SCORE: 3
HOW LIKELY ARE YOU TO NOD OFF OR FALL ASLEEP WHILE LYING DOWN TO REST IN THE AFTERNOON WHEN CIRCUMSTANCES PERMIT: 3
HOW LIKELY ARE YOU TO NOD OFF OR FALL ASLEEP WHILE SITTING AND READING: 0
HOW LIKELY ARE YOU TO NOD OFF OR FALL ASLEEP WHILE SITTING AND TALKING TO SOMEONE: 0

## 2021-09-09 NOTE — PROGRESS NOTES
[facility]                                          TELEHEALTH EVALUATION: Service performed was Audio/Visual (During HFTQL-09 public health emergency) and not a face-to-face visit                     CHIEF COMPLAINT: Follow up NHAN      HPI:   Doing well with her CPAP. Uses it every night. Patient is using CPAP 7-8 hrs/night. Using humidifier. The pressure is well tolerated. Got used to it. Uses pillow mask, irritating her mask with nose soreness, not bad enough and does not want to change. No significant daytime sleepiness. No nodding off when driving. Bedtime is 10-11 pm and rise time is 6 am. Sleep onset is 5 minutes. ESS is 3          From prior visit:   Snoring at night for the past 3-4 years. The severity of snoring is severe. Snoring is interrupting sleep of others. Worse in supine position and better on the side. Has observed sleep apnea. No restorative sleep. No dry mouth upon awakening. Patien choking gasping for air t is complaining of daytime sleepiness, fatigue and tiredness during the day. Bedtime 10-11 pm and rise time is 6 am. Sleep onset 5 minutes. Vivid dreams every night, remembers them in am for several yeas. Sleep paralysis for 3-4 years, upon going to sleep, once every other month. No RBD like symptoms. 1 nocturia. 3 naps/weekf or 3-4 hrs. No car wrecks or near wrecks because of the sleepiness. No nodding off while driving. ESS is 10. Old records were reviewed and summarized by me. Seen in ER August with flu like symptoms, had CT chest showed bilateral lung nodules. Patient is currently being evaluated for gastric sleeve. No date is given yet for the surgery. No known lung disease. No recurrent respiratory infection. No IBD or O2 use. Denies any SOB, cough or wheezes. No smoking- quit 10/2020, smoked < 1 ppd for 16 years.           Past Medical History:   Diagnosis Date    Cataract     Depression     Genital herpes     one outbreak in     Kidney stone     Lung nodule     Pityriasis versicolor     Psoriasis        Past Surgical History:        Procedure Laterality Date    EYE SURGERY      cataract Rt eye    INDUCED       KIDNEY STONE SURGERY      UPPER GASTROINTESTINAL ENDOSCOPY N/A 2021    EGD BIOPSY performed by Trudy Bustamante MD at [address] [facility]       Allergies:  is allergic to penicillins. Social History:    TOBACCO:   reports that she quit smoking about 11 months ago. Her smoking use included cigarettes. She has a 16.00 pack-year smoking history. She has never used smokeless tobacco.  ETOH:   reports previous alcohol use. Family History:       Problem Relation Age of Onset    Stroke Father 62        1    Diabetes Sister 23        type 1     Diabetes Paternal Grandmother        Current Medications:    Current Outpatient Medications:     Norgestimate-Eth Estradiol (SPRINTEC 28 PO), Take 1 tablet by mouth daily, Disp: , Rfl:     omeprazole (PRILOSEC) 20 MG delayed release capsule, Take 1 capsule by mouth Daily, Disp: 30 capsule, Rfl: 3    Cholecalciferol (VITAMIN D) 50 MCG (2000 UT) CAPS capsule, Take by mouth 2 times daily, Disp: , Rfl:     Apremilast (OTEZLA) 30 MG TABS, Take 30 mg by mouth 2 times daily , Disp: , Rfl:     FLUoxetine (PROZAC) 40 MG capsule, Take 1 capsule by mouth daily, Disp: 90 capsule, Rfl: 3    Multiple Vitamins-Minerals (THERAPEUTIC MULTIVITAMIN-MINERALS) tablet, Take 1 tablet by mouth daily, Disp: , Rfl:     BIOTIN PO, Take 1,000 mcg by mouth daily , Disp: , Rfl:     Melatonin 5 MG CAPS, Take by mouth nightly , Disp: , Rfl:         Objective:   PHYSICAL EXAM:    not currently breastfeeding.' on RA  O2 Sat:  Neck size: Not able to obtain   Mallampati class IV. Constitutional:  No acute distress. Appears well developed and nourished. Eyes: No sclera icterus. EOM intact. No visible discharge.    HENT: Head is normocephalic and atraumatic. Mucus membranes are moist and the tongue appears normal. Normal appearing nose. External Ears normal.   Neck: No visualized mass. Kjerika Muñozs is midline   Resp: No accessory muscle use. Respiratory effort normal. No visualized signs of difficulty breathing or respiratory distress. Cardiovascular: No LE edema per patient's report   Musculoskeletal: No signs of ataxia. Normal range of motion of the neck. Skin: No significant exanthematous lesions or discoloration noted on facial skin    Neuro: Awake. Alert. Able to follow commands. No facial asymmetry. No gaze palsy. Psych: No agitation. Normal affect. No hallucinations. Oriented to person/time/place. No anxiety. Normal judgement and insight. DATA reviewed by me:  PFTs 06/09/2021 FVC 4.45 (111%) FEV1 3.54 (107%) FEV1/FVC 80% % TLC 5.79 (104%) DLCO 26.98 (100%) 6MW 1680 F low O2 96%     CT chest 8/18/2020   No PE  Mild dependent atelectasis  Multiple scattered pulmonary nodules, largest 8 mm lingula    CT chest 6/3/2021   No adenopathy  No consolidation or effusion  Previously described nodules resolved      Split-night 6/8/2021 .9 controlled with CPAP 12      CPAP data 07/15-08/13 2021 reviewed by me. Uses 7-8  hrs/night with 70% compliance and AHI of  1.9. Median pressure of 0.4 cmH2O, 95th pressure of  12.4 cmH2O. CPAP 12 cmH2O        Assessment:       · Severe NHAN. CPAP 12 cmH2O. Optimal compliance and efficacy upon review today. · Hypersomnia and fatigue -improving with CPAP  · Morbid obesity  · Abnormal CT chest with nodular infiltrates on CT 8/18/2020. Largest 8 mm in the lingula. Flu/Covid like symptoms at that time with negative Covid test.  Resolution on repeat CT 6/3/2021. · Quit smoking 10/2020, smoked < 1 ppd for 16 years. Unremarkable PFTs      Plan:       Order for CPAP supplies  Continue CPAP 6-8 hrs at night and during naps.   Replacement of mask, tubing, head straps every 3-6 months or

## 2021-09-09 NOTE — PATIENT INSTRUCTIONS
awakening time, Even on weekends! You will feel better keeping a regular sleep cycle, even if you are retired or not working. Get into your favorite sleep position. If not asleep in 30 minutes, get up and do something boring until you feel sleepy. Remember not to expose yourself to bright lights such as TV, phone or tablet screens. Only use your bed for sleeping. Do not use your bed as an office, workroom or recreation room. Use comfortable bedding. Uncomfortable bedding can prevent good sleep. Ensure your bedroom is quiet and comfortable. A cooler room along with enough blankets to stay warm is recommended. If your room is too noisy, try a white noise machine. If too bright, try black out shades or an eye mask. Dont take worries to bed. Leave worries about work, school etc. behind you when you go to bed. Some people find it helpful to assign a worry period in the evening or late afternoon to write down your worries and get them out of your system. CPAP Equipment Cleaning and Disinfecting Schedule  Equipment Cleaning Frequency Instructions  Disinfecting Frequency   Non-Disposable Filters  Weekly Mild soapy water, Rinse, Air Dry Not Required   Disposable Filters Change as needed  2-4 weeks Do Not Wash Not Required   Hose/tubing Daily Mild soapy water, Rinse, Air Dry Once a week   Mask / Nasal Pillows Daily Mild soapy water, Rinse, Air Dry Once a week   Headgear Weekly Hand wash, Mild soapy water, Rinse, Dry  Not Required   Humidifier Daily Empty water daily  Mild soapy water, Rinse well, Air Dry  Once a week   CPAP Unit As Needed Dust with damp cloth,  No detergents or sprays Not Required         Disinfect (per schedule) with 1 part white vinegar and 3 parts water- soak mask and water chamber for 30 minutes every 1-2 weeks, more often if sick. Allow water/vinegar mixture to run through tubing. Allow all equipment to air dry.    Drying Hints:   Always hang tubing away from direct sunlight, as this will cause the tubing to become yellow, brittle and crack over a period of time. DO NOT attach the wet tubing to your CPAP unit to blow-dry it. The moisture from the tubing can drain back into your machine. Moisture in your unit can cause sudden pressure increases or short circuits  DO's and DON'Ts:  - Don't use alcohol-based products to clean your mask, because it can cause the materials to become hard and brittle. - Don't put headgear in the washer or dryer  - Don't use any caustic or household cleaning solutions such as bleach on your CPAP   equipment.  - Do follow the recommended cleaning schedule. - Do change your disposable filter frequently. Adapted From: [email or web address]/cleaning. shtm.   These are general suggestions for all models please follow specific s recommendations and specific instructions

## 2021-09-13 ENCOUNTER — TELEMEDICINE (OUTPATIENT)
Dept: FAMILY MEDICINE CLINIC | Age: 36
End: 2021-09-13
Payer: COMMERCIAL

## 2021-09-13 DIAGNOSIS — E66.01 MORBID OBESITY WITH BMI OF 45.0-49.9, ADULT (HCC): ICD-10-CM

## 2021-09-13 DIAGNOSIS — G47.33 OSA (OBSTRUCTIVE SLEEP APNEA): ICD-10-CM

## 2021-09-13 DIAGNOSIS — G47.00 INSOMNIA, UNSPECIFIED TYPE: Primary | ICD-10-CM

## 2021-09-13 PROCEDURE — 99214 OFFICE O/P EST MOD 30 MIN: CPT | Performed by: STUDENT IN AN ORGANIZED HEALTH CARE EDUCATION/TRAINING PROGRAM

## 2021-09-13 RX ORDER — TRAZODONE HYDROCHLORIDE 50 MG/1
50 TABLET ORAL NIGHTLY
Qty: 30 TABLET | Refills: 0 | Status: SHIPPED | OUTPATIENT
Start: 2021-09-13 | End: 2021-10-18

## 2021-09-13 SDOH — ECONOMIC STABILITY: FOOD INSECURITY: WITHIN THE PAST 12 MONTHS, YOU WORRIED THAT YOUR FOOD WOULD RUN OUT BEFORE YOU GOT MONEY TO BUY MORE.: NEVER TRUE

## 2021-09-13 SDOH — ECONOMIC STABILITY: FOOD INSECURITY: WITHIN THE PAST 12 MONTHS, THE FOOD YOU BOUGHT JUST DIDN'T LAST AND YOU DIDN'T HAVE MONEY TO GET MORE.: NEVER TRUE

## 2021-09-13 ASSESSMENT — SOCIAL DETERMINANTS OF HEALTH (SDOH): HOW HARD IS IT FOR YOU TO PAY FOR THE VERY BASICS LIKE FOOD, HOUSING, MEDICAL CARE, AND HEATING?: NOT HARD AT ALL

## 2021-09-13 NOTE — PROGRESS NOTES
Zeferino Fong (:  1985) is a 29 y.o. female,Established patient, here for evaluation of the following chief complaint(s): Insomnia (falling asleep and staying asleep , wears cpap )         ASSESSMENT/PLAN:  1. Insomnia, unspecified type  -     traZODone (DESYREL) 50 MG tablet; Take 1 tablet by mouth nightly, Disp-30 tablet, R-0Normal  2. Morbid obesity with BMI of 45.0-49.9, adult ([facility] [address]  3. NHAN (obstructive sleep apnea)  Patient with significant insomnia following marijuana cessation. Will trial trazodone. Also discussed that if anxiety not improving with improvement of sleep would consider additional treatment. Planning for bariatric surgery next month. Compliant with CPAP although may be switching masks. No follow-ups on file. SUBJECTIVE/OBJECTIVE:  HPI    NHAN  Compliant with cpap but feels that new nose pillows are irritating to nares. May switch to mask that covers more of face    Insomnia  Has quit smoking marijuana  Has been much more emotional and anxious since stopping marijuana. Practices good sleep hygeine. Now having trouble falling asleep and staying asleep. Heavily fatigued since now waking up frequently.    Day 17 without marijuana    Surgery oct 11th for bypass surgery    ON prozac 40mg since     Review of Systems    Patient-Reported Vitals 2021   Patient-Reported Weight 288 lbs   Patient-Reported Height 5'7   Patient-Reported Temperature -        Physical Exam    [INSTRUCTIONS:  \"[x]\" Indicates a positive item  \"[]\" Indicates a negative item  -- DELETE ALL ITEMS NOT EXAMINED]    Constitutional: [x] Appears well-developed and well-nourished [x] No apparent distress      [] Abnormal -     Mental status: [x] Alert and awake  [x] Oriented to person/place/time [x] Able to follow commands    [] Abnormal -     Eyes:   EOM    [x]  Normal    [] Abnormal -   Sclera  [x]  Normal    [] Abnormal -          Discharge [x]  None visible   [] Abnormal -     HENT: [x] Normocephalic, atraumatic  [] Abnormal -   [x] Mouth/Throat: Mucous membranes are moist    External Ears [x] Normal  [] Abnormal -    Neck: [x] No visualized mass [] Abnormal -     Pulmonary/Chest: [x] Respiratory effort normal   [x] No visualized signs of difficulty breathing or respiratory distress        [] Abnormal -      Musculoskeletal:   [x] Normal gait with no signs of ataxia         [x] Normal range of motion of neck        [] Abnormal -     Neurological:        [x] No Facial Asymmetry (Cranial nerve 7 motor function) (limited exam due to video visit)          [x] No gaze palsy        [] Abnormal -          Skin:        [x] No significant exanthematous lesions or discoloration noted on facial skin         [] Abnormal -            Psychiatric:       [x] Normal Affect [] Abnormal -        [x] No Hallucinations    Other pertinent observable physical exam findings:-                Janay Fong, was evaluated through a synchronous (real-time) audio-video encounter. The patient (or guardian if applicable) is aware that this is a billable service. Verbal consent to proceed has been obtained within the past 12 months. The visit was conducted pursuant to the emergency declaration under the [address] [place] [address], [address] [place] [address] authority and the [employer] General Act. Patient identification was verified, and a caregiver was present when appropriate. The patient was located in a state where the provider was credentialed to provide care. An electronic signature was used to authenticate this note.     --Sarath Hernandez,

## 2021-09-14 ENCOUNTER — OFFICE VISIT (OUTPATIENT)
Dept: BARIATRICS/WEIGHT MGMT | Age: 36
End: 2021-09-14
Payer: COMMERCIAL

## 2021-09-14 ENCOUNTER — HOSPITAL ENCOUNTER (OUTPATIENT)
Age: 36
Discharge: HOME | End: 2021-09-14
Payer: COMMERCIAL

## 2021-09-14 VITALS — WEIGHT: 293 LBS | BODY MASS INDEX: 47.09 KG/M2 | HEIGHT: 66 IN

## 2021-09-14 DIAGNOSIS — G47.30 OBSERVED SLEEP APNEA: ICD-10-CM

## 2021-09-14 DIAGNOSIS — Z01.818 PREOP TESTING: ICD-10-CM

## 2021-09-14 DIAGNOSIS — E66.01 MORBID OBESITY WITH BMI OF 45.0-49.9, ADULT (HCC): ICD-10-CM

## 2021-09-14 DIAGNOSIS — K21.9 CHRONIC GERD: Primary | ICD-10-CM

## 2021-09-14 DIAGNOSIS — E78.2 MIXED HYPERLIPIDEMIA: ICD-10-CM

## 2021-09-14 LAB
AMPHETAMINE SCREEN, URINE: NORMAL
BARBITURATE SCREEN URINE: NORMAL
BENZODIAZEPINE SCREEN, URINE: NORMAL
CANNABINOID SCREEN URINE: NORMAL
COCAINE METABOLITE SCREEN URINE: NORMAL
Lab: NORMAL
METHADONE SCREEN, URINE: NORMAL
OPIATE SCREEN URINE: NORMAL
OXYCODONE URINE: NORMAL
PH UA: 6
PHENCYCLIDINE SCREEN URINE: NORMAL
PROPOXYPHENE SCREEN: NORMAL

## 2021-09-14 PROCEDURE — 80307 DRUG TEST PRSMV CHEM ANLYZR: CPT

## 2021-09-14 PROCEDURE — 99214 OFFICE O/P EST MOD 30 MIN: CPT | Performed by: NURSE PRACTITIONER

## 2021-09-15 ENCOUNTER — TELEPHONE (OUTPATIENT)
Dept: BARIATRICS/WEIGHT MGMT | Age: 36
End: 2021-09-15

## 2021-09-23 ENCOUNTER — OFFICE VISIT (OUTPATIENT)
Dept: FAMILY MEDICINE CLINIC | Age: 36
End: 2021-09-23
Payer: COMMERCIAL

## 2021-09-23 VITALS
SYSTOLIC BLOOD PRESSURE: 109 MMHG | DIASTOLIC BLOOD PRESSURE: 60 MMHG | OXYGEN SATURATION: 98 % | WEIGHT: 293 LBS | BODY MASS INDEX: 48.16 KG/M2 | HEART RATE: 65 BPM

## 2021-09-23 DIAGNOSIS — Z00.00 ENCOUNTER FOR ANNUAL PHYSICAL EXAM: Primary | ICD-10-CM

## 2021-09-23 PROCEDURE — 99395 PREV VISIT EST AGE 18-39: CPT | Performed by: STUDENT IN AN ORGANIZED HEALTH CARE EDUCATION/TRAINING PROGRAM

## 2021-09-23 ASSESSMENT — ENCOUNTER SYMPTOMS
VOMITING: 0
CONSTIPATION: 0
DIARRHEA: 0
SHORTNESS OF BREATH: 0
NAUSEA: 0

## 2021-09-23 NOTE — PROGRESS NOTES
2021    Milena Fong (:  1985) is a 29 y.o. female, here for a preventive medicine evaluation. Patient Active Problem List   Diagnosis    Recurrent major depressive disorder, in full remission ([facility] [address]    Morbid obesity with BMI of 45.0-49.9, adult ([facility] [address]    Chronic GERD    Snoring    Mixed hyperlipidemia    Vitamin D deficiency    Observed sleep apnea    Other fatigue       Review of Systems   Constitutional: Negative for chills and fever. Eyes: Negative for visual disturbance. Respiratory: Negative for shortness of breath. Cardiovascular: Negative for chest pain and palpitations. Gastrointestinal: Negative for constipation, diarrhea, nausea and vomiting. Genitourinary: Negative for difficulty urinating. Musculoskeletal: Negative for gait problem. Skin: Negative for rash. Neurological: Negative for dizziness and light-headedness. Psychiatric/Behavioral: Negative for confusion and decreased concentration. Prior to Visit Medications    Medication Sig Taking?  Authorizing Provider   traZODone (DESYREL) 50 MG tablet Take 1 tablet by mouth nightly Yes Tyler Lepe,    Norgestimate-Eth Estradiol (SPRINTEC 28 PO) Take 1 tablet by mouth daily Yes Historical Provider, MD   Cholecalciferol (VITAMIN D) 50 MCG ( UT) CAPS capsule Take by mouth 2 times daily Yes Historical Provider, MD   Apremilast (OTEZLA) 30 MG TABS Take 30 mg by mouth 2 times daily  Yes Historical Provider, MD   FLUoxetine (PROZAC) 40 MG capsule Take 1 capsule by mouth daily Yes Jany Malin, DO   Multiple Vitamins-Minerals (THERAPEUTIC MULTIVITAMIN-MINERALS) tablet Take 1 tablet by mouth daily Yes Historical Provider, MD   BIOTIN PO Take 1,000 mcg by mouth daily  Yes Historical Provider, MD   Melatonin 5 MG CAPS Take by mouth nightly  Yes Historical Provider, MD   omeprazole (PRILOSEC) 20 MG delayed release capsule Take 1 capsule by mouth Daily  Patient not taking: Reported on 2021 Daren Alvarez APRN - CNP        Allergies   Allergen Reactions    Penicillins Nausea And Vomiting       Past Medical History:   Diagnosis Date    Cataract     Depression     Genital herpes     one outbreak in     Kidney stone     Lung nodule     Pityriasis versicolor     Psoriasis        Past Surgical History:   Procedure Laterality Date    EYE SURGERY      cataract Rt eye    INDUCED       KIDNEY STONE SURGERY      UPPER GASTROINTESTINAL ENDOSCOPY N/A 2021    EGD BIOPSY performed by Gabrielle Nelson MD at [address] [place] [address] History     Socioeconomic History    Marital status: Single     Spouse name: Not on file    Number of children: Not on file    Years of education: Not on file    Highest education level: Not on file   Occupational History    Not on file   Tobacco Use    Smoking status: Former Smoker     Packs/day: 1.00     Years: 16.00     Pack years: 16.00     Types: Cigarettes     Quit date: 10/2020     Years since quittin.0    Smokeless tobacco: Never Used   Vaping Use    Vaping Use: Never used   Substance and Sexual Activity    Alcohol use: Not Currently     Comment: very rare    Drug use: Not Currently     Types: Marijuana     Comment: 3-4x a week    Sexual activity: Yes   Other Topics Concern    Not on file   Social History Narrative    Not on file     Social Determinants of Health     Financial Resource Strain: Low Risk     Difficulty of Paying Living Expenses: Not hard at all   Food Insecurity: No Food Insecurity    Worried About [facility] in the Last Year: Never true    [facility] in the Last Year: Never true   Transportation Needs:     Lack of Transportation (Medical):      Lack of Transportation (Non-Medical):    Physical Activity:     Days of Exercise per Week:     Minutes of Exercise per Session:    Stress:     Feeling of Stress :    Social Connections:     Frequency of Communication with Friends and Family:     Frequency of Social Gatherings with Friends and Family:     Attends Oriental orthodox Services:     Active Member of Clubs or Organizations:     Attends Club or Organization Meetings:     Marital Status:    Intimate Partner Violence:     Fear of Current or Ex-Partner:     Emotionally Abused:     Physically Abused:     Sexually Abused:         Family History   Problem Relation Age of Onset    Stroke Father 62        1    Diabetes Sister 23        type 1     Diabetes Paternal Grandmother        ADVANCE DIRECTIVE: N, <no information>    Vitals:    09/23/21 1310   BP: 109/60   Site: Right Upper Arm   Position: Sitting   Cuff Size: Large Adult   Pulse: 65   SpO2: 98%   Weight: 298 lb 6.4 oz (135.4 kg)     Estimated body mass index is 48.16 kg/m² as calculated from the following:    Height as of 9/14/21: 5' 6\" (1.676 m). Weight as of this encounter: 298 lb 6.4 oz (135.4 kg). Physical Exam  Vitals reviewed. Constitutional:       General: She is not in acute distress. Appearance: Normal appearance. She is not ill-appearing. HENT:      Head: Normocephalic and atraumatic. Right Ear: Tympanic membrane normal.      Left Ear: Tympanic membrane normal.   Eyes:      Extraocular Movements: Extraocular movements intact. Conjunctiva/sclera: Conjunctivae normal.   Cardiovascular:      Rate and Rhythm: Normal rate and regular rhythm. Pulses: Normal pulses. Heart sounds: Normal heart sounds. No murmur heard. Pulmonary:      Effort: Pulmonary effort is normal.      Breath sounds: Normal breath sounds. Abdominal:      General: Abdomen is flat. Bowel sounds are normal. There is no distension. Palpations: Abdomen is soft. Tenderness: There is no abdominal tenderness. Musculoskeletal:         General: No swelling. Normal range of motion. Right lower leg: Edema (trace) present. Left lower leg: Edema (trace) present. Skin:     General: Skin is warm and dry.       Capillary Refill: Capillary refill takes less than 2 seconds. Findings: No rash. Neurological:      General: No focal deficit present. Mental Status: She is alert and oriented to person, place, and time. Psychiatric:         Mood and Affect: Mood normal.         Behavior: Behavior normal.         Thought Content: Thought content normal.         Judgment: Judgment normal.         No flowsheet data found. Lab Results   Component Value Date    CHOL 192 05/05/2021    CHOL 174 08/10/2018    TRIG 247 05/05/2021    TRIG 115 08/10/2018    HDL 35 05/05/2021    HDL 51 08/10/2018    LDLCALC 108 05/05/2021    LDLCALC 100 08/10/2018    GLUCOSE 93 05/05/2021    LABA1C 5.6 05/05/2021    LABA1C 5.2 08/23/2019    LABA1C 5.1 08/10/2018       The ASCVD Risk score (Hilaria Robb, et al., 2013) failed to calculate for the following reasons:     The 2013 ASCVD risk score is only valid for ages 36 to 78    Immunization History   Administered Date(s) Administered    COVID-19, Moderna, PF, 100mcg/0.5mL 06/03/2021, 06/28/2021    DT (pediatric) 10/01/1997    DTP 1985, 1985, 08/29/1991, 05/07/1992    Hepatitis B Ped/Adol (Engerix-B, Recombivax HB) 09/03/1998    Influenza Vaccine, unspecified formulation 09/14/2018    Influenza Virus Vaccine 11/24/2015, 09/06/2016    Influenza, Petr Aas, IM, (6 mo and older Fluzone, Flulaval, Fluarix and 3 yrs and older Afluria) 10/08/2020    Influenza, Quadv, IM, PF (6 mo and older Fluzone, Flulaval, Fluarix, and 3 yrs and older Afluria) 11/24/2015, 09/06/2016, 11/15/2017, 09/14/2018    MMR 09/26/1991, 10/01/1997    Pneumococcal Polysaccharide ([id number]) 08/23/2019    Polio Virus Vaccine 1986, 1986, 08/29/1991, 05/07/1992    Tdap (Boostrix, Adacel) 05/23/2013, 11/15/2017       Health Maintenance   Topic Date Due    Hepatitis B vaccine (2 of 3 - 3-dose primary series) 10/01/1998    Flu vaccine (1) 09/01/2021    Cervical cancer screen  06/16/2024    DTaP/Tdap/Td

## 2021-10-01 ENCOUNTER — TELEPHONE (OUTPATIENT)
Dept: BARIATRICS/WEIGHT MGMT | Age: 36
End: 2021-10-01

## 2021-10-01 NOTE — TELEPHONE ENCOUNTER
Patient is scheduled for surgery Oct 11 and is cancelling her surgery. -has unopened product and would like a refund.  -wants her 3 mo free access to gym  -feels like program was not thoroughly explained to her    Patient would like a call back from practice manager at her earliest convenience.

## 2021-10-04 NOTE — TELEPHONE ENCOUNTER
Unfortunate to hear she did not have good experience. Products need to be unopened. We would have loved for her to get the 3 months free membership, but unfortunately its only for our post surgical patients.      Best of luck and wish her the best.

## 2022-03-09 ENCOUNTER — TELEPHONE (OUTPATIENT)
Dept: PULMONOLOGY | Age: 37
End: 2022-03-09

## 2022-03-09 NOTE — TELEPHONE ENCOUNTER
Pt called saying she elected to cx today's apt with Dr. Alex Haro when she received the text reminder. After she selected \"cancel appointment\", she received a text response saying someone from the office will be contacting you to reschedule. I informed her that we've had some trouble with the text reminder system conveying these messages to us and that today's appointment will not be marked as a no show. Pt states she did not go through with the weight loss surgery and hasn't been using her new CPAP at all recently. She has elected to not reschedule at this time because she said there's no point since she's not been using her CPAP. But she will start back up soon and call us afterwards to resched her f/u.

## 2022-03-09 NOTE — TELEPHONE ENCOUNTER
Patient did not show for 6 month NHAN appointment  with Dr Mary Penn on 3/9/22    Same Day Cancellation: NA    Patient rescheduled:  NA    New appointment:     Patient was also no show on: n/a     LOV 09/09/2021      Assessment:       · Severe NHAN. CPAP 12 cmH2O. Optimal compliance and efficacy upon review today. · Hypersomnia and fatigue -improving with CPAP  · Morbid obesity  · Abnormal CT chest with nodular infiltrates on CT 8/18/2020. Largest 8 mm in the lingula. Flu/Covid like symptoms at that time with negative Covid test.  Resolution on repeat CT 6/3/2021. · Quit smoking 10/2020, smoked < 1 ppd for 16 years. Unremarkable PFTs      Plan:       · Order for CPAP supplies  · Continue CPAP 6-8 hrs at night and during naps. · Replacement of mask, tubing, head straps every 3-6 months or sooner if damaged. · Follow up CPAP compliance and pressure adjustment if needed  · Sleep hygiene  · Avoid sedatives, alcohol and caffeinated drinks at bed time. · No driving motorized vehicles or operating heavy machinery while fatigue, drowsy or sleepy. · Weight loss is also recommended as a long-term intervention. · Advised to continue with smoking cessation.    · Follow up in 6 months or sooner if needed

## 2022-03-25 DIAGNOSIS — F41.9 ANXIETY AND DEPRESSION: ICD-10-CM

## 2022-03-25 DIAGNOSIS — F32.A ANXIETY AND DEPRESSION: ICD-10-CM

## 2022-03-25 RX ORDER — FLUOXETINE HYDROCHLORIDE 40 MG/1
CAPSULE ORAL
Qty: 90 CAPSULE | Refills: 1 | Status: SHIPPED | OUTPATIENT
Start: 2022-03-25

## 2022-03-25 NOTE — TELEPHONE ENCOUNTER
Refill Request     Last Seen: Last Seen Department: 9/23/2021  Last Seen by PCP: 9/23/2021    Last Written: 3/1/21 90 capsule 3 refill     Next Appointment:             Requested Prescriptions     Pending Prescriptions Disp Refills    FLUoxetine (PROZAC) 40 MG capsule [Pharmacy Med Name: FLUoxetine HCl Oral Capsule 40 MG] 90 capsule 1     Sig: TAKE 1 CAPSULE BY MOUTH EVERY DAY

## 2022-04-19 ENCOUNTER — TELEMEDICINE (OUTPATIENT)
Dept: PRIMARY CARE CLINIC | Age: 37
End: 2022-04-19
Payer: COMMERCIAL

## 2022-04-19 DIAGNOSIS — R11.2 NON-INTRACTABLE VOMITING WITH NAUSEA, UNSPECIFIED VOMITING TYPE: ICD-10-CM

## 2022-04-19 DIAGNOSIS — J06.9 VIRAL URI WITH COUGH: Primary | ICD-10-CM

## 2022-04-19 PROCEDURE — 99213 OFFICE O/P EST LOW 20 MIN: CPT | Performed by: NURSE PRACTITIONER

## 2022-04-19 RX ORDER — ONDANSETRON 4 MG/1
4 TABLET, FILM COATED ORAL 3 TIMES DAILY PRN
Qty: 15 TABLET | Refills: 0 | Status: SHIPPED | OUTPATIENT
Start: 2022-04-19 | End: 2022-10-25

## 2022-04-19 RX ORDER — GUAIFENESIN 600 MG/1
1200 TABLET, EXTENDED RELEASE ORAL 2 TIMES DAILY
Qty: 40 TABLET | Refills: 0 | Status: SHIPPED | OUTPATIENT
Start: 2022-04-19 | End: 2022-04-29

## 2022-04-19 RX ORDER — DEXTROMETHORPHAN HYDROBROMIDE AND PROMETHAZINE HYDROCHLORIDE 15; 6.25 MG/5ML; MG/5ML
5 SYRUP ORAL 4 TIMES DAILY PRN
Qty: 140 ML | Refills: 0 | Status: SHIPPED | OUTPATIENT
Start: 2022-04-19 | End: 2022-04-26

## 2022-04-19 ASSESSMENT — ENCOUNTER SYMPTOMS
VOMITING: 1
SORE THROAT: 1
NAUSEA: 1
COUGH: 1
DIARRHEA: 0
RHINORRHEA: 0
ABDOMINAL PAIN: 0
WHEEZING: 0
SHORTNESS OF BREATH: 0

## 2022-04-19 NOTE — Clinical Note
[address] [facility] [address]  Phone: [phone]  Fax: [phone] [facility], APRN - CNP    April 19, 2022     DO Mihir Pantoja    Patient: Teo Simons   MR Number: [id number]   YOB: 1985   Date of Visit: 4/19/2022       Dear Jacqueline Lepe: Thank you for referring Bear Pablo to me for evaluation/treatment. Below are the relevant portions of my assessment and plan of care. If you have questions, please do not hesitate to call me. I look forward to following Janay along with you.     Sincerely,      JAYE Gomes - CNP

## 2022-04-19 NOTE — LETTER
I had the pleasure of seeing Russ Tovar today for a primary care virtualist video visit secondary to viral uri with cough. I have provided the following recommendations: symptom relief, covid testing, ED for severe symptoms, f/u with pcp if no resolution/improvement or for FMLA paperwork or extended work absence. I have included my note for your review and have asked the patient to follow up with you on an as needed basis. If you have questions, please reach out via [facility] secure messaging by searching for the [facility] Primary Care Virtualists. Your communication will be answered promptly by the Virtualist on service for the day. Additionally, we would love your overall feedback on this visit. Please hit shift and click the following link to let us know if the Virtualist service met your expectations. [employer] [email or web address]      Electronically signed by JAYE Mai CNP on 4/19/22 at 11:23 AM LEWT

## 2022-04-19 NOTE — PROGRESS NOTES
Chrystal Fong (:  1985) is a Established patient, here for evaluation of the following:    Assessment & Plan   Below is the assessment and plan developed based on review of pertinent history, physical exam, labs, studies, and medications. 1. Viral URI with cough  -     promethazine-dextromethorphan (PROMETHAZINE-DM) 6.25-15 MG/5ML syrup; Take 5 mLs by mouth 4 times daily as needed for Cough, Disp-140 mL, R-0Normal  -     guaiFENesin (MUCINEX) 600 MG extended release tablet; Take 2 tablets by mouth 2 times daily for 10 days, Disp-40 tablet, R-0Normal  -     ondansetron (ZOFRAN) 4 MG tablet; Take 1 tablet by mouth 3 times daily as needed for Nausea or Vomiting, Disp-15 tablet, R-0Normal  2. Non-intractable vomiting with nausea, unspecified vomiting type  -     ondansetron (ZOFRAN) 4 MG tablet; Take 1 tablet by mouth 3 times daily as needed for Nausea or Vomiting, Disp-15 tablet, R-0Normal  Offered flu/covid testing, patient declined as she does not feel up to leaving her house. Take home covid test and send results through [address] Take the zofran first. After 45 minutes you may take 1 tablespoon of electrolyte solution every 15 mins. If you can tolerate that without vomiting, you may drink a 1/4c every 15 mins. Stay on a clear liquid diet until you haven't vomited for 4 hours. Resume regular diet as tolerated, being mindful that high fiber, high protein, and fatty foods may be harder to digest.    Return if symptoms worsen or fail to improve. Subjective   URI   This is a new problem. Episode onset: 22. The problem has been gradually worsening. The maximum temperature recorded prior to her arrival was 100.4 - 100.9 F. Associated symptoms include coughing (worse when lying down), nausea, a sore throat and vomiting (today). Pertinent negatives include no abdominal pain, chest pain, congestion, diarrhea, headaches, rhinorrhea or wheezing.  Associated symptoms comments: Chills, nausea, appetite loss, myalgia. She has tried acetaminophen (erlinda seltzer) for the symptoms. Did not get a flu shot this year, has had sick contacts, none as sick as her. Review of Systems   Constitutional: Positive for activity change, appetite change, fatigue and fever. HENT: Positive for sore throat. Negative for congestion and rhinorrhea. Respiratory: Positive for cough (worse when lying down). Negative for shortness of breath and wheezing. Cardiovascular: Negative for chest pain. Gastrointestinal: Positive for nausea and vomiting (today). Negative for abdominal pain and diarrhea. Musculoskeletal: Positive for myalgias. Neurological: Negative for headaches.           Objective   Patient-Reported Vitals  No data recorded     Physical Exam  [INSTRUCTIONS:  \"[x]\" Indicates a positive item  \"[]\" Indicates a negative item  -- DELETE ALL ITEMS NOT EXAMINED]    Constitutional: [x] Appears well-developed and well-nourished [x] No apparent distress      [] Abnormal -     Mental status: [x] Alert and awake  [x] Oriented to person/place/time [x] Able to follow commands    [] Abnormal -     Eyes:   EOM    [x]  Normal    [] Abnormal -   Sclera  [x]  Normal    [] Abnormal -          Discharge [x]  None visible   [] Abnormal -     HENT: [x] Normocephalic, atraumatic  [] Abnormal -       External Ears [x] Normal  [] Abnormal -    Neck: [x] No visualized mass [] Abnormal -     Pulmonary/Chest: [x] Respiratory effort normal   [x] No visualized signs of difficulty breathing or respiratory distress, no coughing during visit        [] Abnormal -      Musculoskeletal:   [x] Normal gait with no signs of ataxia         [x] Normal range of motion of neck        [] Abnormal -     Neurological:        [x] No Facial Asymmetry (Cranial nerve 7 motor function) (limited exam due to video visit)          [x] No gaze palsy        [] Abnormal -          Skin:        [x] No significant exanthematous lesions or discoloration noted on facial skin         [] Abnormal -            Psychiatric:       [x] Normal Affect [] Abnormal -        [x] No Hallucinations    Other pertinent observable physical exam findings:-             On this date 4/19/2022 I have spent 23 minutes reviewing previous notes, test results and face to face (virtual) with the patient discussing the diagnosis and importance of compliance with the treatment plan as well as documenting on the day of the visit. Janay Fong, was evaluated through a synchronous (real-time) audio-video encounter. The patient (or guardian if applicable) is aware that this is a billable service, which includes applicable co-pays. This Virtual Visit was conducted with patient's (and/or legal guardian's) consent. The visit was conducted pursuant to the emergency declaration under the [address] [place] [address], [address] [place] [address] [facility] authority and the [employer] and [employer] Act. Patient identification was verified, and a caregiver was present when appropriate. The patient was located at home in a state where the provider was licensed to provide care.        --JAYE Sarkar - CNP

## 2022-04-19 NOTE — LETTER
[address] [place] [address]  Phone: [phone]  Fax: [phone], APRN - CNP        April 19, 2022     Patient: Rubin Dutton   YOB: 1985   Date of Visit: 4/19/2022       To Whom it May Concern:    Vicky Riddle was seen in my clinic on 4/19/2022. She may return to work on 4/21/22. If you have any questions or concerns, please don't hesitate to call.     Sincerely,         Michal Boxer, JAYE - CNP

## 2022-04-19 NOTE — PATIENT INSTRUCTIONS
Patient Education        Viral Respiratory Infection: Care Instructions  Your Care Instructions     Viruses are very small organisms. They grow in number after they enter your body. There are many types that cause different illnesses, such as colds andthe mumps. The symptoms of a viral respiratory infection often start quickly. They include a fever, sore throat, and runny nose. You may also just not feel well. Or youmay not want to eat much. Most viral respiratory infections are not serious. They usually get better withtime and self-care. Antibiotics are not used to treat a viral infection. That's because antibiotics will not help cure a viral illness. In some cases, antiviral medicine can helpyour body fight a serious viral infection. Follow-up care is a key part of your treatment and safety. Be sure to make and go to all appointments, and call your doctor if you are having problems. It's also a good idea to know your test results and keep alist of the medicines you take. How can you care for yourself at home?  Rest as much as possible until you feel better.  Be safe with medicines. Take your medicine exactly as prescribed. Call your doctor if you think you are having a problem with your medicine. You will get more details on the specific medicine your doctor prescribes.  Take an over-the-counter pain medicine, such as acetaminophen (Tylenol), ibuprofen (Advil, Motrin), or naproxen (Aleve), as needed for pain and fever. Read and follow all instructions on the label. Do not give aspirin to anyone younger than 20. It has been linked to Reye syndrome, a serious illness.  Drink plenty of fluids. Hot fluids, such as tea or soup, may help relieve congestion in your nose and throat. If you have kidney, heart, or liver disease and have to limit fluids, talk with your doctor before you increase the amount of fluids you drink.  Try to clear mucus from your lungs by breathing deeply and coughing.    Gargle with warm salt water once an hour. This can help reduce swelling and throat pain. Use 1 teaspoon of salt mixed in 1 cup of warm water.  Do not smoke or allow others to smoke around you. If you need help quitting, talk to your doctor about stop-smoking programs and medicines. These can increase your chances of quitting for good. To avoid spreading the virus   Cough or sneeze into a tissue. Then throw the tissue away.  If you don't have a tissue, use your hand to cover your cough or sneeze. Then clean your hand. You can also cough into your sleeve.  Wash your hands often. Use soap and warm water. Wash for 15 to 20 seconds each time.  If you don't have soap and water near you, you can clean your hands with alcohol wipes or gel. When should you call for help? Call your doctor now or seek immediate medical care if:     You have a new or higher fever.      Your fever lasts more than 48 hours.      You have trouble breathing.      You have a fever with a stiff neck or a severe headache.      You are sensitive to light.      You feel very sleepy or confused. Watch closely for changes in your health, and be sure to contact your doctor if:     You do not get better as expected. Where can you learn more? Go to [email or web address] and sign in to your [employer] account. Enter N135 in the [facility] box to learn more about \"Viral Respiratory Infection: Care Instructions. \"     If you do not have an account, please click on the \"Sign Up Now\" link. Current as of: July 6, 2021               Content Version: 13.2  © 2006-2022 [employer]wise[employer] Inc[employer]. Care instructions adapted under license by [facility] ([facility]). If you have questions about a medical condition or this instruction, always ask your healthcare professional. [place] [address] any warranty or liability for your use of this information.

## 2022-07-18 ENCOUNTER — APPOINTMENT (OUTPATIENT)
Dept: CT IMAGING | Age: 37
End: 2022-07-18
Payer: COMMERCIAL

## 2022-07-18 ENCOUNTER — HOSPITAL ENCOUNTER (EMERGENCY)
Age: 37
Discharge: HOME | End: 2022-07-19
Attending: EMERGENCY MEDICINE
Payer: COMMERCIAL

## 2022-07-18 ENCOUNTER — APPOINTMENT (OUTPATIENT)
Dept: GENERAL RADIOLOGY | Age: 37
End: 2022-07-18
Payer: COMMERCIAL

## 2022-07-18 VITALS
OXYGEN SATURATION: 96 % | SYSTOLIC BLOOD PRESSURE: 128 MMHG | TEMPERATURE: 99 F | DIASTOLIC BLOOD PRESSURE: 88 MMHG | RESPIRATION RATE: 16 BRPM | HEART RATE: 98 BPM

## 2022-07-18 DIAGNOSIS — R52 BODY ACHES: ICD-10-CM

## 2022-07-18 DIAGNOSIS — R51.9 ACUTE NONINTRACTABLE HEADACHE, UNSPECIFIED HEADACHE TYPE: ICD-10-CM

## 2022-07-18 DIAGNOSIS — U07.1 COVID-19: Primary | ICD-10-CM

## 2022-07-18 DIAGNOSIS — R07.89 CHEST DISCOMFORT: ICD-10-CM

## 2022-07-18 LAB
A/G RATIO: 1.7 (ref 1.1–2.2)
ALBUMIN SERPL-MCNC: 4.4 G/DL (ref 3.4–5)
ALP BLD-CCNC: 94 U/L (ref 40–129)
ALT SERPL-CCNC: 49 U/L (ref 10–40)
ANION GAP SERPL CALCULATED.3IONS-SCNC: 9 MMOL/L (ref 3–16)
AST SERPL-CCNC: 37 U/L (ref 15–37)
BASOPHILS ABSOLUTE: 0 K/UL (ref 0–0.2)
BASOPHILS RELATIVE PERCENT: 0 %
BILIRUB SERPL-MCNC: 0.8 MG/DL (ref 0–1)
BUN BLDV-MCNC: 8 MG/DL (ref 7–20)
CALCIUM SERPL-MCNC: 9.4 MG/DL (ref 8.3–10.6)
CHLORIDE BLD-SCNC: 99 MMOL/L (ref 99–110)
CO2: 25 MMOL/L (ref 21–32)
CREAT SERPL-MCNC: 0.9 MG/DL (ref 0.6–1.1)
D DIMER: 2.05 UG/ML FEU (ref 0–0.6)
EOSINOPHILS ABSOLUTE: 0 K/UL (ref 0–0.6)
EOSINOPHILS RELATIVE PERCENT: 0 %
GFR AFRICAN AMERICAN: >60
GFR NON-AFRICAN AMERICAN: >60
GLUCOSE BLD-MCNC: 106 MG/DL (ref 70–99)
HCG QUALITATIVE: NEGATIVE
HCT VFR BLD CALC: 40.3 % (ref 36–48)
HEMOGLOBIN: 13.8 G/DL (ref 12–16)
LIPASE: 23 U/L (ref 13–60)
LYMPHOCYTES ABSOLUTE: 0.4 K/UL (ref 1–5.1)
LYMPHOCYTES RELATIVE PERCENT: 7 %
MCH RBC QN AUTO: 31.8 PG (ref 26–34)
MCHC RBC AUTO-ENTMCNC: 34.1 G/DL (ref 31–36)
MCV RBC AUTO: 93.3 FL (ref 80–100)
MONOCYTES ABSOLUTE: 0.4 K/UL (ref 0–1.3)
MONOCYTES RELATIVE PERCENT: 6 %
NEUTROPHILS ABSOLUTE: 5.6 K/UL (ref 1.7–7.7)
NEUTROPHILS RELATIVE PERCENT: 87 %
PDW BLD-RTO: 13.5 % (ref 12.4–15.4)
PLATELET # BLD: 197 K/UL (ref 135–450)
PLATELET SLIDE REVIEW: ADEQUATE
PMV BLD AUTO: 8.8 FL (ref 5–10.5)
POTASSIUM SERPL-SCNC: 3.9 MMOL/L (ref 3.5–5.1)
RBC # BLD: 4.32 M/UL (ref 4–5.2)
SARS-COV-2, NAAT: DETECTED
SLIDE REVIEW: ABNORMAL
SODIUM BLD-SCNC: 133 MMOL/L (ref 136–145)
TOTAL PROTEIN: 7 G/DL (ref 6.4–8.2)
TROPONIN: <0.01 NG/ML
VACUOLATED NEUTROPHILS: PRESENT
WBC # BLD: 6.4 K/UL (ref 4–11)

## 2022-07-18 PROCEDURE — 85379 FIBRIN DEGRADATION QUANT: CPT

## 2022-07-18 PROCEDURE — 71045 X-RAY EXAM CHEST 1 VIEW: CPT

## 2022-07-18 PROCEDURE — 84484 ASSAY OF TROPONIN QUANT: CPT

## 2022-07-18 PROCEDURE — 87635 SARS-COV-2 COVID-19 AMP PRB: CPT

## 2022-07-18 PROCEDURE — 85025 COMPLETE CBC W/AUTO DIFF WBC: CPT

## 2022-07-18 PROCEDURE — 83690 ASSAY OF LIPASE: CPT

## 2022-07-18 PROCEDURE — 6360000002 HC RX W HCPCS: Performed by: EMERGENCY MEDICINE

## 2022-07-18 PROCEDURE — 6360000004 HC RX CONTRAST MEDICATION: Performed by: EMERGENCY MEDICINE

## 2022-07-18 PROCEDURE — 2580000003 HC RX 258: Performed by: EMERGENCY MEDICINE

## 2022-07-18 PROCEDURE — 99285 EMERGENCY DEPT VISIT HI MDM: CPT

## 2022-07-18 PROCEDURE — 80053 COMPREHEN METABOLIC PANEL: CPT

## 2022-07-18 PROCEDURE — 84703 CHORIONIC GONADOTROPIN ASSAY: CPT

## 2022-07-18 PROCEDURE — 96361 HYDRATE IV INFUSION ADD-ON: CPT

## 2022-07-18 PROCEDURE — 96375 TX/PRO/DX INJ NEW DRUG ADDON: CPT

## 2022-07-18 PROCEDURE — 71260 CT THORAX DX C+: CPT | Performed by: EMERGENCY MEDICINE

## 2022-07-18 PROCEDURE — 96374 THER/PROPH/DIAG INJ IV PUSH: CPT

## 2022-07-18 PROCEDURE — 93005 ELECTROCARDIOGRAM TRACING: CPT | Performed by: EMERGENCY MEDICINE

## 2022-07-18 RX ORDER — DIPHENHYDRAMINE HYDROCHLORIDE 50 MG/ML
12.5 INJECTION INTRAMUSCULAR; INTRAVENOUS ONCE
Status: COMPLETED | OUTPATIENT
Start: 2022-07-18 | End: 2022-07-18

## 2022-07-18 RX ORDER — KETOROLAC TROMETHAMINE 30 MG/ML
15 INJECTION, SOLUTION INTRAMUSCULAR; INTRAVENOUS ONCE
Status: COMPLETED | OUTPATIENT
Start: 2022-07-18 | End: 2022-07-18

## 2022-07-18 RX ORDER — 0.9 % SODIUM CHLORIDE 0.9 %
500 INTRAVENOUS SOLUTION INTRAVENOUS ONCE
Status: COMPLETED | OUTPATIENT
Start: 2022-07-18 | End: 2022-07-18

## 2022-07-18 RX ORDER — METOCLOPRAMIDE HYDROCHLORIDE 5 MG/ML
10 INJECTION INTRAMUSCULAR; INTRAVENOUS ONCE
Status: COMPLETED | OUTPATIENT
Start: 2022-07-18 | End: 2022-07-18

## 2022-07-18 RX ADMIN — SODIUM CHLORIDE 500 ML: 9 INJECTION, SOLUTION INTRAVENOUS at 22:02

## 2022-07-18 RX ADMIN — KETOROLAC TROMETHAMINE 15 MG: 30 INJECTION, SOLUTION INTRAMUSCULAR at 22:01

## 2022-07-18 RX ADMIN — DIPHENHYDRAMINE HYDROCHLORIDE 12.5 MG: 50 INJECTION, SOLUTION INTRAMUSCULAR; INTRAVENOUS at 22:01

## 2022-07-18 RX ADMIN — IOPAMIDOL 75 ML: 755 INJECTION, SOLUTION INTRAVENOUS at 22:57

## 2022-07-18 RX ADMIN — METOCLOPRAMIDE HYDROCHLORIDE 10 MG: 5 INJECTION INTRAMUSCULAR; INTRAVENOUS at 22:01

## 2022-07-18 ASSESSMENT — PAIN SCALES - GENERAL
PAINLEVEL_OUTOF10: 8
PAINLEVEL_OUTOF10: 8

## 2022-07-18 ASSESSMENT — PAIN - FUNCTIONAL ASSESSMENT: PAIN_FUNCTIONAL_ASSESSMENT: 0-10

## 2022-07-18 NOTE — Clinical Note
Jung Becker was seen and treated in our emergency department on 7/18/2022. She may return to work on 07/26/2022. If you have any questions or concerns, please don't hesitate to call.       Sola Mcgee MD

## 2022-07-19 ENCOUNTER — TELEPHONE (OUTPATIENT)
Dept: FAMILY MEDICINE CLINIC | Age: 37
End: 2022-07-19

## 2022-07-19 LAB
EKG ATRIAL RATE: 86 BPM
EKG DIAGNOSIS: NORMAL
EKG P AXIS: 40 DEGREES
EKG P-R INTERVAL: 138 MS
EKG Q-T INTERVAL: 358 MS
EKG QRS DURATION: 74 MS
EKG QTC CALCULATION (BAZETT): 428 MS
EKG R AXIS: 74 DEGREES
EKG T AXIS: 36 DEGREES
EKG VENTRICULAR RATE: 86 BPM

## 2022-07-19 PROCEDURE — 93010 ELECTROCARDIOGRAM REPORT: CPT | Performed by: INTERNAL MEDICINE

## 2022-07-19 RX ORDER — ALBUTEROL SULFATE 90 UG/1
AEROSOL, METERED RESPIRATORY (INHALATION)
Qty: 18 G | Refills: 0 | Status: SHIPPED | OUTPATIENT
Start: 2022-07-19 | End: 2022-11-01

## 2022-07-19 RX ORDER — METOCLOPRAMIDE 10 MG/1
10 TABLET ORAL 3 TIMES DAILY PRN
Qty: 15 TABLET | Refills: 0 | Status: SHIPPED | OUTPATIENT
Start: 2022-07-19 | End: 2022-07-24

## 2022-07-19 NOTE — TELEPHONE ENCOUNTER
Pt called in.  She tested positive for covid last night at the hospital and was prescribed an antiviral covid medication and the pt wants to know if she's okay to take it?please advise

## 2022-07-19 NOTE — ED NOTES
Pt resting on cot in position of comfort. Respirations regular. Airway intact. GCS 15. Pt holding conversation with this RN w/o difficulty. Pt texting on phone. Awaiting further orders. Will continue to monitor.          Althea Gibbs RN  07/18/22 [id number]

## 2022-07-19 NOTE — ED NOTES
Pt ok to d/c to home. Pt given d/c instructions. Pt verbalized understating including Rx and follow up care. Pt ambulated to lobby for ride home.  0 s/s of distress at time of d/c.          Bong Lainez RN  07/19/22 [id number]

## 2022-07-19 NOTE — ED PROVIDER NOTES
[address] [facility]  ED  EMERGENCY DEPARTMENT ENCOUNTER        Pt Name: Dallas White  MRN: [id number]  Thea 1985  Date of evaluation: 7/18/2022  Provider: Jerri De La Garza MD  PCP: Armando Hodges       Chief Complaint   Patient presents with    Emesis     Vomiting for 24 hours headache  + sob, body aches. + fever         HISTORY OFPRESENT ILLNESS   (Location/Symptom, Timing/Onset, Context/Setting, Quality, Duration, Modifying Factors,Severity)  Note limiting factors. Dallas White is a 40 y.o. female presenting today due to concern for developing body aches with hot and cold flashes associated with shortness of breath and moderate headache over the last 24 hours and multiple episodes of vomiting. She denies any actual abdominal pain but does have an uneasy sensation in her abdomen. She denies any urinary complaints. Headache is not the worst of her life but is not going away. She denies any falls or trauma. She denies any unilateral numbness or weakness but does feel weak all over. She did get her COVID-vaccine. She does report being around [facility] a few weeks ago but then going on vacation in [place] and right when she got back from [place] yesterday she started developing the symptoms. By the time I saw her, her COVID test had come back positive. She is on estrogen for birth control but denies any abnormal leg swelling or history of blood clots. She currently denies any shortness of breath while sitting in bed and denies any chest pain. She reports slight discomfort with taking a deep breath. She does have a cough and sore throat. Due to concern for feeling ill and weak all over, she came to the emergency department for further evaluation. REVIEW OF SYSTEMS    (2-9 systems for level 4, 10 or more for level 5)     Review of Systems   Constitutional:  Positive for activity change, appetite change, chills, fatigue and fever.  Negative for diaphoresis. HENT:  Positive for congestion, rhinorrhea, sinus pressure and sore throat. Negative for dental problem, trouble swallowing and voice change. Eyes:  Negative for photophobia and pain. Respiratory:  Positive for cough and shortness of breath. Negative for chest tightness. Cardiovascular:  Positive for chest pain (occasional with coughing). Negative for leg swelling. Gastrointestinal:  Positive for nausea and vomiting. Negative for abdominal pain, constipation and diarrhea. Genitourinary:  Negative for difficulty urinating, dysuria, flank pain, pelvic pain and vaginal bleeding. Musculoskeletal:  Positive for back pain (low back associated with body aches) and myalgias. Negative for gait problem, neck pain and neck stiffness. Skin:  Negative for color change and wound. Neurological:  Positive for weakness (generalized) and headaches (moderate, not worst of life). Negative for dizziness, syncope, light-headedness and numbness. Psychiatric/Behavioral:  Negative for confusion. The patient is nervous/anxious. Positives and Pertinent negatives as per HPI.       PASTMEDICAL HISTORY     Past Medical History:   Diagnosis Date    Cataract     Depression     Genital herpes     one outbreak in     Kidney stone     Lung nodule     Pityriasis versicolor     Psoriasis          SURGICAL HISTORY       Past Surgical History:   Procedure Laterality Date    EYE SURGERY      cataract Rt eye    INDUCED       KIDNEY STONE SURGERY      UPPER GASTROINTESTINAL ENDOSCOPY N/A 2021    EGD BIOPSY performed by Vanna Zaragoza MD at [facility]       Discharge Medication List as of 2022 12:07 AM        CONTINUE these medications which have NOT CHANGED    Details   ondansetron (ZOFRAN) 4 MG tablet Take 1 tablet by mouth 3 times daily as needed for Nausea or Vomiting, Disp-15 tablet, R-0Normal      FLUoxetine (PROZAC) 40 MG capsule TAKE 1 CAPSULE BY MOUTH EVERY DAY, Disp-90 capsule, R-1Normal      Norgestimate-Eth Estradiol (SPRINTEC 28 PO) Take 1 tablet by mouth dailyHistorical Med      Cholecalciferol (VITAMIN D) 50 MCG (2000) CAPS capsule Take by mouth 2 times dailyHistorical Med      Apremilast (OTEZLA) 30 MG TABS Take 30 mg by mouth 2 times daily Historical Med      Multiple Vitamins-Minerals (THERAPEUTIC MULTIVITAMIN-MINERALS) tablet Take 1 tablet by mouth dailyHistorical Med      BIOTIN PO Take 1,000 mcg by mouth daily Historical Med      Melatonin 5 MG CAPS Take by mouth nightly Historical Med             ALLERGIES     Penicillins    FAMILY HISTORY       Family History   Problem Relation Age of Onset    Stroke Father 62        1    Diabetes Sister 23        type 1     Diabetes Paternal Grandmother           SOCIAL HISTORY       Social History     Socioeconomic History    Marital status: Single     Spouse name: None    Number of children: None    Years of education: None    Highest education level: None   Tobacco Use    Smoking status: Former     Packs/day: 1.00     Years: 16.00     Pack years: 16.00     Types: Cigarettes     Quit date: 10/2020     Years since quittin.8    Smokeless tobacco: Never   Vaping Use    Vaping Use: Never used   Substance and Sexual Activity    Alcohol use: Not Currently     Comment: very rare    Drug use: Not Currently     Types: Marijuana Charmayne Stai)     Comment: 3-4x a week    Sexual activity: Yes     Social Determinants of Health     Financial Resource Strain: Low Risk     Difficulty of Paying Living Expenses: Not hard at all   Food Insecurity: No Food Insecurity    Worried About Running Out of Food in the Last Year: Never true    Ran Out of Food in the Last Year: Never true       SCREENINGS                PHYSICAL EXAM    (up to 7 for level 4, 8 or more for level 5)     ED Triage Vitals [22]   BP Temp Temp Source Heart Rate Resp SpO2 Height Weight   130/83 99 °F (37.2 °C) Oral (!) 101 18 97 % -- --       Physical Exam  Vitals and nursing note reviewed. Constitutional:       General: She is awake. She is not in acute distress. Appearance: She is well-developed and well-groomed. She is morbidly obese. She is ill-appearing. She is not toxic-appearing or diaphoretic. Interventions: She is not intubated. HENT:      Head: Normocephalic and atraumatic. Right Ear: External ear normal.      Left Ear: External ear normal.      Nose: Nose normal.      Mouth/Throat:      Mouth: Mucous membranes are dry. Pharynx: Oropharynx is clear. Posterior oropharyngeal erythema present. No oropharyngeal exudate. Eyes:      General: No scleral icterus. Right eye: No discharge. Left eye: No discharge. Conjunctiva/sclera: Conjunctivae normal.      Pupils: Pupils are equal, round, and reactive to light. Neck:      Thyroid: No thyroid tenderness. Trachea: Trachea and phonation normal. No tracheal tenderness or tracheal deviation. Comments: No nuchal rigidity  Cardiovascular:      Rate and Rhythm: Regular rhythm. Tachycardia present. Pulses: Normal pulses. Radial pulses are 2+ on the right side and 2+ on the left side. Pulmonary:      Effort: Pulmonary effort is normal. No tachypnea, bradypnea, accessory muscle usage, prolonged expiration, respiratory distress or retractions. She is not intubated. Breath sounds: Normal breath sounds and air entry. No stridor. No decreased breath sounds, wheezing, rhonchi or rales. Chest:      Chest wall: No tenderness. Abdominal:      General: Abdomen is flat. Bowel sounds are normal. There is no distension. Palpations: Abdomen is soft. Abdomen is not rigid. Tenderness: There is no abdominal tenderness. There is no guarding or rebound. Negative signs include Bruner's sign and McBurney's sign. Musculoskeletal:         General: No tenderness, deformity or signs of injury. Normal range of motion.       Cervical back: Full passive 106 (*)     ALT 49 (*)     All other components within normal limits   D-DIMER, QUANTITATIVE - Abnormal; Notable for the following components:    D-Dimer, Quant 2.05 (*)     All other components within normal limits   LIPASE   TROPONIN   HCG, SERUM, QUALITATIVE       All other labs were within normal range or not returned asof this dictation. EKG: All EKG's are interpreted by the Emergency Department Physician who either signs or Co-signs this chart in the absence of a cardiologist.    The Ekg interpreted by me shows  normal sinus rhythm with a rate of 86  Axis is   Normal  QTc is  normal  Intervals and Durations are unremarkable. ST Segments: no acute change and nonspecific changes  No significant change from prior EKG dated - 5/5/21  No STEMI         RADIOLOGY:   Non-plain film images such as CT, Ultrasound and MRI are read by the radiologist. Velvet Poag images are visualized and preliminarily interpreted by the  ED Provider with the belowfindings:        Interpretation per the Radiologist below, if available at the time of this note:    CT CHEST PULMONARY EMBOLISM W CONTRAST   Final Result   No evidence of pulmonary embolism with the limitations of suboptimal   opacification of the pulmonary arterial system. This is nondiagnostic for   small segmental emboli. Mild opacities in the lung bases favoring mild atelectasis, less likely   subtle infection/pneumonia. XR CHEST PORTABLE   Final Result   Summation of the breast shadow over the left lower lung more than the right   side. However no sign of pneumonia or pleural effusion. Pulmonary   vasculature is not congested.                PROCEDURES   Unless otherwise noted below, none     Procedures    CRITICAL CARE TIME   N/A    CONSULTS:  None    EMERGENCY DEPARTMENT COURSE and DIFFERENTIAL DIAGNOSIS/MDM:   Vitals:    Vitals:    07/18/22 1940 07/18/22 2125 07/18/22 2209   BP: 130/83  128/88   Pulse: (!) 101 95 98   Resp: 18 16    Temp: 99 °F (37.2 °C)     TempSrc: Oral     SpO2: 97% 98% 96%       Patient was given the following medications:  Medications   0.9 % sodium chloride bolus (0 mLs IntraVENous Stopped 7/18/22 2336)   ketorolac (TORADOL) injection 15 mg (15 mg IntraVENous Given 7/18/22 2201)   metoclopramide (REGLAN) injection 10 mg (10 mg IntraVENous Given 7/18/22 2201)   diphenhydrAMINE (BENADRYL) injection 12.5 mg (12.5 mg IntraVENous Given 7/18/22 2201)   iopamidol (ISOVUE-370) 76 % injection 75 mL (75 mLs IntraVENous Given 7/18/22 2257)     Patient was evaluated due to developing generalized malaise with cough, sore throat, shortness of breath and vomiting since yesterday. She did test positive for COVID which would explain her symptoms although did recently travel to [place] and is on estrogen. She did complain about some discomfort with coughing and therefore I did order a D-dimer which was elevated and therefore CT was obtained. CT did not show any obvious sign of pulmonary embolism but was reportedly nondiagnostic per radiologist.  Troponin was negative and story not suggestive of acute coronary syndrome. Her headache greatly improved after receiving Reglan along with Benadryl and Toradol. Upon repeat assessment she was feeling much better. She is aware that the CT did not give a completely diagnostic result although overall was reassuring. At this time, she has full mental capacity to make her own medical decisions and feels comfortable going home with no further testing but is aware if she develops any worsening chest discomfort with shortness of breath, abnormal leg swelling, severe headache, vomiting associate with abdominal pain, confusion, or any other concerns, then return to the emergency department immediately for further assessment, but otherwise follow-up with primary doctor in the next 2 to 3 days via telemedicine to ensure she is doing well.   She was started on Paxlovid due to concern for being overweight to avoid any complications from COVID. She was well-appearing and in no acute distress at time of discharge and felt comfortable with this plan. The patient tolerated their visit well. The patient and / or the family were informed of the results of any tests, a time was given to answer questions. FINAL IMPRESSION      1. COVID-19    2. Chest discomfort    3. Acute nonintractable headache, unspecified headache type    4. Body aches          DISPOSITION/PLAN   DISPOSITION Decision To Discharge 07/19/2022 12:04:08 AM      PATIENT REFERRED TO:  [facility]  ED  [address] [facility] [address]  Go to   If symptoms worsen    Kita DO Mihir Abarca  [phone]    Schedule an appointment as soon as possible for a visit in 2 days  Telemedicine    DISCHARGEMEDICATIONS:  Discharge Medication List as of 7/19/2022 12:07 AM        START taking these medications    Details   metoclopramide (REGLAN) 10 MG tablet Take 1 tablet by mouth 3 times daily as needed (vomiting), Disp-15 tablet, R-0Print      albuterol sulfate HFA (PROVENTIL;VENTOLIN;PROAIR) 108 (90 Base) MCG/ACT inhaler Use 2 puffs 4 times daily as needed for wheezing/cough. Dispense with Spacer and instruct in use. At patient's preference may use 60 dose MDI. May Sub Pro-Air or Proventil as needed per insurance., Disp-18 g, R-0, DAWPrint      nirmatrelvir/ritonavir (PAXLOVID) 20 x 150 MG & 10 x 100MG Take 3 tablets (two 150 mg nirmatrelvir and one 100 mg ritonavir tablets) by mouth every 12 hours for 5 days. , Disp-30 tablet, R-0Print             DISCONTINUED MEDICATIONS:  Discharge Medication List as of 7/19/2022 12:07 AM                 (Please note that portions of this note were completed with a voicerecognition program.  Efforts were made to edit the dictations but occasionally words are mis-transcribed.)    Sulema Lane MD (electronically signed)            Sulema Lane MD  07/20/22 [facility]

## 2022-07-19 NOTE — DISCHARGE INSTRUCTIONS
Take Tylenol or Motrin as needed for pain or fever. Take Reglan or Zofran for nausea. Take albuterol for cough. Take Paxlovid due to concern for COVID. Follow-up with primary doctor in the next 2 to 3 days via telemedicine to ensure you are doing well. Return to the emergency department for any worsening chest discomfort with severe shortness of breath, severe headache with vomiting, confusion, or any other concerns. Make sure to isolate for 7 days from onset of symptoms and as long as symptoms are improving with no fever, you can return to work but otherwise you may need to be off longer. Talk to your primary doctor about this.

## 2022-07-19 NOTE — ED NOTES
Ambulated patient about 200 feet. Patients Spo2 stayed between 94-96% and heart rate stayed between 80 -98. Patient denied any dizziness, shortness of breath or chest pain. Patient back in bed with call light in reach and back on monitor.      Tamika Gudino  07/19/22 0003

## 2022-07-20 ASSESSMENT — ENCOUNTER SYMPTOMS
BACK PAIN: 1
PHOTOPHOBIA: 0
SINUS PRESSURE: 1
VOICE CHANGE: 0
TROUBLE SWALLOWING: 0
ABDOMINAL PAIN: 0
VOMITING: 1
SHORTNESS OF BREATH: 1
DIARRHEA: 0
CHEST TIGHTNESS: 0
CONSTIPATION: 0
NAUSEA: 1
RHINORRHEA: 1
EYE PAIN: 0
COLOR CHANGE: 0
SORE THROAT: 1
COUGH: 1

## 2022-09-14 LAB — PAP SMEAR, EXTERNAL: NEGATIVE

## 2022-10-24 ENCOUNTER — TELEPHONE (OUTPATIENT)
Dept: FAMILY MEDICINE CLINIC | Age: 37
End: 2022-10-24

## 2022-10-24 NOTE — TELEPHONE ENCOUNTER
Patient called back and stated she is drinking lots of water and is trying to pass it at home. She has everything at home that is needed .

## 2022-10-25 ENCOUNTER — OFFICE VISIT (OUTPATIENT)
Dept: FAMILY MEDICINE CLINIC | Age: 37
End: 2022-10-25
Payer: COMMERCIAL

## 2022-10-25 VITALS
HEART RATE: 86 BPM | SYSTOLIC BLOOD PRESSURE: 128 MMHG | HEIGHT: 66 IN | WEIGHT: 293 LBS | BODY MASS INDEX: 47.09 KG/M2 | DIASTOLIC BLOOD PRESSURE: 70 MMHG | OXYGEN SATURATION: 98 %

## 2022-10-25 DIAGNOSIS — Z23 NEED FOR INFLUENZA VACCINATION: ICD-10-CM

## 2022-10-25 DIAGNOSIS — N20.0 RECURRENT KIDNEY STONES: Primary | ICD-10-CM

## 2022-10-25 LAB
BILIRUBIN, POC: NEGATIVE
BLOOD URINE, POC: NORMAL
CLARITY, POC: CLEAR
COLOR, POC: NORMAL
GLUCOSE URINE, POC: NEGATIVE
KETONES, POC: NEGATIVE
LEUKOCYTE EST, POC: NORMAL
NITRITE, POC: NEGATIVE
PH, POC: 6
PROTEIN, POC: NEGATIVE
SPECIFIC GRAVITY, POC: 1.03
UROBILINOGEN, POC: 1

## 2022-10-25 PROCEDURE — 81002 URINALYSIS NONAUTO W/O SCOPE: CPT | Performed by: STUDENT IN AN ORGANIZED HEALTH CARE EDUCATION/TRAINING PROGRAM

## 2022-10-25 PROCEDURE — 90674 CCIIV4 VAC NO PRSV 0.5 ML IM: CPT | Performed by: STUDENT IN AN ORGANIZED HEALTH CARE EDUCATION/TRAINING PROGRAM

## 2022-10-25 PROCEDURE — 90471 IMMUNIZATION ADMIN: CPT | Performed by: STUDENT IN AN ORGANIZED HEALTH CARE EDUCATION/TRAINING PROGRAM

## 2022-10-25 PROCEDURE — 99214 OFFICE O/P EST MOD 30 MIN: CPT | Performed by: STUDENT IN AN ORGANIZED HEALTH CARE EDUCATION/TRAINING PROGRAM

## 2022-10-25 RX ORDER — TAMSULOSIN HYDROCHLORIDE 0.4 MG/1
0.4 CAPSULE ORAL DAILY
Qty: 30 CAPSULE | Refills: 0 | Status: SHIPPED | OUTPATIENT
Start: 2022-10-25

## 2022-10-25 ASSESSMENT — PATIENT HEALTH QUESTIONNAIRE - PHQ9
3. TROUBLE FALLING OR STAYING ASLEEP: 1
9. THOUGHTS THAT YOU WOULD BE BETTER OFF DEAD, OR OF HURTING YOURSELF: 0
SUM OF ALL RESPONSES TO PHQ9 QUESTIONS 1 & 2: 1
SUM OF ALL RESPONSES TO PHQ QUESTIONS 1-9: 4
1. LITTLE INTEREST OR PLEASURE IN DOING THINGS: 0
8. MOVING OR SPEAKING SO SLOWLY THAT OTHER PEOPLE COULD HAVE NOTICED. OR THE OPPOSITE, BEING SO FIGETY OR RESTLESS THAT YOU HAVE BEEN MOVING AROUND A LOT MORE THAN USUAL: 0
6. FEELING BAD ABOUT YOURSELF - OR THAT YOU ARE A FAILURE OR HAVE LET YOURSELF OR YOUR FAMILY DOWN: 0
SUM OF ALL RESPONSES TO PHQ QUESTIONS 1-9: 4
10. IF YOU CHECKED OFF ANY PROBLEMS, HOW DIFFICULT HAVE THESE PROBLEMS MADE IT FOR YOU TO DO YOUR WORK, TAKE CARE OF THINGS AT HOME, OR GET ALONG WITH OTHER PEOPLE: 1
5. POOR APPETITE OR OVEREATING: 1
2. FEELING DOWN, DEPRESSED OR HOPELESS: 1
4. FEELING TIRED OR HAVING LITTLE ENERGY: 1
SUM OF ALL RESPONSES TO PHQ QUESTIONS 1-9: 4
7. TROUBLE CONCENTRATING ON THINGS, SUCH AS READING THE NEWSPAPER OR WATCHING TELEVISION: 0
SUM OF ALL RESPONSES TO PHQ QUESTIONS 1-9: 4

## 2022-10-25 ASSESSMENT — ANXIETY QUESTIONNAIRES
4. TROUBLE RELAXING: 1
GAD7 TOTAL SCORE: 5
1. FEELING NERVOUS, ANXIOUS, OR ON EDGE: 1
3. WORRYING TOO MUCH ABOUT DIFFERENT THINGS: 1
7. FEELING AFRAID AS IF SOMETHING AWFUL MIGHT HAPPEN: 0
6. BECOMING EASILY ANNOYED OR IRRITABLE: 1
5. BEING SO RESTLESS THAT IT IS HARD TO SIT STILL: 0
2. NOT BEING ABLE TO STOP OR CONTROL WORRYING: 1
IF YOU CHECKED OFF ANY PROBLEMS ON THIS QUESTIONNAIRE, HOW DIFFICULT HAVE THESE PROBLEMS MADE IT FOR YOU TO DO YOUR WORK, TAKE CARE OF THINGS AT HOME, OR GET ALONG WITH OTHER PEOPLE: NOT DIFFICULT AT ALL

## 2022-10-25 NOTE — PROGRESS NOTES
Patient: Abner Wood is a 40 y.o. female who presents today with the following Chief Complaint(s):  Chief Complaint   Patient presents with    Urinary Tract Infection     Lower back pain          Urinary Tract Infection      Reports Saturday evening she noticed increased pain on right side and noticed blood in urine. Has history of recurrent kidney stones. Has had an 11mm stone that required lithotripsy and stent. Funneling her urine to look for passed stones. Today feels better than she has the last 3 days. Using heating pad, tylenol extra strength    Current Outpatient Medications   Medication Sig Dispense Refill    tamsulosin (FLOMAX) 0.4 MG capsule Take 1 capsule by mouth daily 30 capsule 0    FLUoxetine (PROZAC) 40 MG capsule TAKE 1 CAPSULE BY MOUTH EVERY DAY 90 capsule 1    Norgestimate-Eth Estradiol (SPRINTEC 28 PO) Take 1 tablet by mouth daily      Apremilast (OTEZLA) 30 MG TABS Take 30 mg by mouth 2 times daily       Multiple Vitamins-Minerals (THERAPEUTIC MULTIVITAMIN-MINERALS) tablet Take 1 tablet by mouth daily      Melatonin 5 MG CAPS Take by mouth nightly       cefdinir (OMNICEF) 300 MG capsule Take 1 capsule by mouth 2 times daily for 7 days 14 capsule 0    ondansetron (ZOFRAN) 4 MG tablet Take 1 tablet by mouth every 8 hours as needed for Nausea 20 tablet 0    oxybutynin (DITROPAN XL) 5 MG extended release tablet Take 1 tablet by mouth daily 30 tablet 3    metoclopramide (REGLAN) 10 MG tablet Take 1 tablet by mouth 3 times daily as needed (vomiting) (Patient not taking: Reported on 10/25/2022) 15 tablet 0     No current facility-administered medications for this visit. Patient's past medical history, surgical history, family history, medications,  andallergies  were all reviewed and updated as appropriate today. Review of Systems  All other systems reviewed and negative    Physical Exam  Vitals reviewed. Constitutional:       Appearance: Normal appearance.    HENT:      Head: Normocephalic and atraumatic. Cardiovascular:      Rate and Rhythm: Normal rate and regular rhythm. Pulmonary:      Effort: Pulmonary effort is normal.      Breath sounds: Normal breath sounds. Abdominal:      Tenderness: There is right CVA tenderness. Neurological:      General: No focal deficit present. Mental Status: She is alert and oriented to person, place, and time. Psychiatric:         Behavior: Behavior normal.         Thought Content: Thought content normal.     Vitals:    10/25/22 1548   BP: 128/70   Pulse: 86   SpO2: 98%       Assessment:  Encounter Diagnoses   Name Primary? Recurrent kidney stones Yes    Need for influenza vaccination        Plan:  1. Recurrent kidney stones  UA with moderate blood. Consistent with kidney stone without infection. Will add flomax. Continue to strain urine. Pain controlled with current regimen. If not improving then recommend repeat evaluation or if acute worsening of pain  - tamsulosin (FLOMAX) 0.4 MG capsule; Take 1 capsule by mouth daily  Dispense: 30 capsule; Refill: 0  - POCT Urinalysis no Micro    2. Need for influenza vaccination  - Influenza, FLUCELVAX, (age 10 mo+), IM, Preservative Free, 0.5 mL      No follow-ups on file.

## 2022-10-25 NOTE — LETTER
[address]  [facility] [address]  Phone: [phone]  Fax: [phone]    Patricio De Oliveira DO        October 25, 2022     Patient: Stefano Camejo   YOB: 1985   Date of Visit: 10/25/2022       To Whom It May Concern: It is my medical opinion that Angelica Sickle should be allowed to work from home due to medical condition. Can return to the office 10/31 or sooner if symptoms resolve    If you have any questions or concerns, please don't hesitate to call.     Sincerely,    Patricio De Oliveira DO

## 2022-11-01 ENCOUNTER — APPOINTMENT (OUTPATIENT)
Dept: CT IMAGING | Age: 37
End: 2022-11-01
Payer: COMMERCIAL

## 2022-11-01 ENCOUNTER — HOSPITAL ENCOUNTER (EMERGENCY)
Age: 37
Discharge: HOME | End: 2022-11-01
Payer: COMMERCIAL

## 2022-11-01 VITALS
DIASTOLIC BLOOD PRESSURE: 83 MMHG | OXYGEN SATURATION: 98 % | TEMPERATURE: 97.5 F | WEIGHT: 293 LBS | HEIGHT: 67 IN | RESPIRATION RATE: 18 BRPM | BODY MASS INDEX: 45.99 KG/M2 | SYSTOLIC BLOOD PRESSURE: 115 MMHG | HEART RATE: 82 BPM

## 2022-11-01 DIAGNOSIS — R10.9 FLANK PAIN, ACUTE: Primary | ICD-10-CM

## 2022-11-01 DIAGNOSIS — R31.9 URINARY TRACT INFECTION WITH HEMATURIA, SITE UNSPECIFIED: ICD-10-CM

## 2022-11-01 DIAGNOSIS — N39.0 URINARY TRACT INFECTION WITH HEMATURIA, SITE UNSPECIFIED: ICD-10-CM

## 2022-11-01 DIAGNOSIS — R31.9 HEMATURIA, UNSPECIFIED TYPE: ICD-10-CM

## 2022-11-01 LAB
A/G RATIO: 1.3 (ref 1.1–2.2)
ALBUMIN SERPL-MCNC: 3.6 G/DL (ref 3.4–5)
ALP BLD-CCNC: 87 U/L (ref 40–129)
ALT SERPL-CCNC: 25 U/L (ref 10–40)
ANION GAP SERPL CALCULATED.3IONS-SCNC: 10 MMOL/L (ref 3–16)
AST SERPL-CCNC: 18 U/L (ref 15–37)
BACTERIA: ABNORMAL /HPF
BASOPHILS ABSOLUTE: 0 K/UL (ref 0–0.2)
BASOPHILS RELATIVE PERCENT: 0.3 %
BILIRUB SERPL-MCNC: 0.6 MG/DL (ref 0–1)
BILIRUBIN URINE: NEGATIVE
BLOOD, URINE: ABNORMAL
BUN BLDV-MCNC: 8 MG/DL (ref 7–20)
CALCIUM SERPL-MCNC: 8.6 MG/DL (ref 8.3–10.6)
CHLORIDE BLD-SCNC: 105 MMOL/L (ref 99–110)
CLARITY: ABNORMAL
CO2: 23 MMOL/L (ref 21–32)
COLOR: YELLOW
CREAT SERPL-MCNC: 0.6 MG/DL (ref 0.6–1.1)
EOSINOPHILS ABSOLUTE: 0.2 K/UL (ref 0–0.6)
EOSINOPHILS RELATIVE PERCENT: 3 %
EPITHELIAL CELLS, UA: ABNORMAL /HPF (ref 0–5)
GFR SERPL CREATININE-BSD FRML MDRD: >60 ML/MIN/{1.73_M2}
GLUCOSE BLD-MCNC: 111 MG/DL (ref 70–99)
GLUCOSE URINE: NEGATIVE MG/DL
HCG QUALITATIVE: NEGATIVE
HCT VFR BLD CALC: 42.8 % (ref 36–48)
HEMOGLOBIN: 14.4 G/DL (ref 12–16)
KETONES, URINE: NEGATIVE MG/DL
LEUKOCYTE ESTERASE, URINE: ABNORMAL
LYMPHOCYTES ABSOLUTE: 1.9 K/UL (ref 1–5.1)
LYMPHOCYTES RELATIVE PERCENT: 27.8 %
MCH RBC QN AUTO: 32 PG (ref 26–34)
MCHC RBC AUTO-ENTMCNC: 33.7 G/DL (ref 31–36)
MCV RBC AUTO: 95.2 FL (ref 80–100)
MICROSCOPIC EXAMINATION: YES
MONOCYTES ABSOLUTE: 0.5 K/UL (ref 0–1.3)
MONOCYTES RELATIVE PERCENT: 6.7 %
NEUTROPHILS ABSOLUTE: 4.2 K/UL (ref 1.7–7.7)
NEUTROPHILS RELATIVE PERCENT: 62.2 %
NITRITE, URINE: NEGATIVE
PDW BLD-RTO: 13.9 % (ref 12.4–15.4)
PH UA: 6 (ref 5–8)
PLATELET # BLD: 226 K/UL (ref 135–450)
PMV BLD AUTO: 8.8 FL (ref 5–10.5)
POTASSIUM REFLEX MAGNESIUM: 4.1 MMOL/L (ref 3.5–5.1)
PROTEIN UA: NEGATIVE MG/DL
RBC # BLD: 4.49 M/UL (ref 4–5.2)
RBC UA: ABNORMAL /HPF (ref 0–4)
SODIUM BLD-SCNC: 138 MMOL/L (ref 136–145)
SPECIFIC GRAVITY UA: 1.02 (ref 1–1.03)
TOTAL PROTEIN: 6.3 G/DL (ref 6.4–8.2)
URINE REFLEX TO CULTURE: ABNORMAL
URINE TYPE: ABNORMAL
UROBILINOGEN, URINE: 0.2 E.U./DL
WBC # BLD: 6.8 K/UL (ref 4–11)
WBC UA: ABNORMAL /HPF (ref 0–5)

## 2022-11-01 PROCEDURE — 74177 CT ABD & PELVIS W/CONTRAST: CPT

## 2022-11-01 PROCEDURE — 99285 EMERGENCY DEPT VISIT HI MDM: CPT

## 2022-11-01 PROCEDURE — 81001 URINALYSIS AUTO W/SCOPE: CPT

## 2022-11-01 PROCEDURE — 84703 CHORIONIC GONADOTROPIN ASSAY: CPT

## 2022-11-01 PROCEDURE — 85025 COMPLETE CBC W/AUTO DIFF WBC: CPT

## 2022-11-01 PROCEDURE — 96374 THER/PROPH/DIAG INJ IV PUSH: CPT

## 2022-11-01 PROCEDURE — 6360000004 HC RX CONTRAST MEDICATION: Performed by: NURSE PRACTITIONER

## 2022-11-01 PROCEDURE — 80053 COMPREHEN METABOLIC PANEL: CPT

## 2022-11-01 PROCEDURE — 6360000002 HC RX W HCPCS: Performed by: NURSE PRACTITIONER

## 2022-11-01 PROCEDURE — 96375 TX/PRO/DX INJ NEW DRUG ADDON: CPT

## 2022-11-01 PROCEDURE — 6370000000 HC RX 637 (ALT 250 FOR IP): Performed by: NURSE PRACTITIONER

## 2022-11-01 RX ORDER — CEFDINIR 300 MG/1
300 CAPSULE ORAL 2 TIMES DAILY
Qty: 14 CAPSULE | Refills: 0 | Status: SHIPPED | OUTPATIENT
Start: 2022-11-01 | End: 2022-11-08

## 2022-11-01 RX ORDER — KETOROLAC TROMETHAMINE 30 MG/ML
15 INJECTION, SOLUTION INTRAMUSCULAR; INTRAVENOUS ONCE
Status: COMPLETED | OUTPATIENT
Start: 2022-11-01 | End: 2022-11-01

## 2022-11-01 RX ORDER — MORPHINE SULFATE 4 MG/ML
4 INJECTION, SOLUTION INTRAMUSCULAR; INTRAVENOUS ONCE
Status: COMPLETED | OUTPATIENT
Start: 2022-11-01 | End: 2022-11-01

## 2022-11-01 RX ORDER — CEFDINIR 300 MG/1
300 CAPSULE ORAL ONCE
Status: COMPLETED | OUTPATIENT
Start: 2022-11-01 | End: 2022-11-01

## 2022-11-01 RX ORDER — ONDANSETRON 2 MG/ML
4 INJECTION INTRAMUSCULAR; INTRAVENOUS ONCE
Status: COMPLETED | OUTPATIENT
Start: 2022-11-01 | End: 2022-11-01

## 2022-11-01 RX ORDER — ONDANSETRON 4 MG/1
4 TABLET, FILM COATED ORAL EVERY 8 HOURS PRN
Qty: 20 TABLET | Refills: 0 | Status: SHIPPED | OUTPATIENT
Start: 2022-11-01

## 2022-11-01 RX ORDER — OXYBUTYNIN CHLORIDE 5 MG/1
5 TABLET, EXTENDED RELEASE ORAL DAILY
Qty: 30 TABLET | Refills: 3 | Status: SHIPPED | OUTPATIENT
Start: 2022-11-01

## 2022-11-01 RX ADMIN — ONDANSETRON HYDROCHLORIDE 4 MG: 2 INJECTION, SOLUTION INTRAMUSCULAR; INTRAVENOUS at 09:40

## 2022-11-01 RX ADMIN — IOPAMIDOL 75 ML: 755 INJECTION, SOLUTION INTRAVENOUS at 10:15

## 2022-11-01 RX ADMIN — MORPHINE SULFATE 4 MG: 4 INJECTION, SOLUTION INTRAMUSCULAR; INTRAVENOUS at 09:40

## 2022-11-01 RX ADMIN — CEFDINIR 300 MG: 300 CAPSULE ORAL at 11:42

## 2022-11-01 RX ADMIN — KETOROLAC TROMETHAMINE 15 MG: 30 INJECTION, SOLUTION INTRAMUSCULAR; INTRAVENOUS at 09:40

## 2022-11-01 ASSESSMENT — PAIN SCALES - GENERAL
PAINLEVEL_OUTOF10: 9
PAINLEVEL_OUTOF10: 2
PAINLEVEL_OUTOF10: 9

## 2022-11-01 ASSESSMENT — PAIN DESCRIPTION - DESCRIPTORS: DESCRIPTORS: DISCOMFORT

## 2022-11-01 ASSESSMENT — ENCOUNTER SYMPTOMS
VOMITING: 0
RHINORRHEA: 0
FACIAL SWELLING: 0
ABDOMINAL PAIN: 0
SHORTNESS OF BREATH: 0
SORE THROAT: 0
NAUSEA: 1
COLOR CHANGE: 0

## 2022-11-01 ASSESSMENT — PAIN - FUNCTIONAL ASSESSMENT
PAIN_FUNCTIONAL_ASSESSMENT: PREVENTS OR INTERFERES SOME ACTIVE ACTIVITIES AND ADLS
PAIN_FUNCTIONAL_ASSESSMENT: 0-10

## 2022-11-01 ASSESSMENT — PAIN DESCRIPTION - LOCATION: LOCATION: FLANK

## 2022-11-01 ASSESSMENT — PAIN DESCRIPTION - ORIENTATION: ORIENTATION: RIGHT

## 2022-11-01 NOTE — Clinical Note
Yohannes Valle was seen and treated in our emergency department on 11/1/2022. She may return to work on 11/04/2022. Or sooner if feeling better     If you have any questions or concerns, please don't hesitate to call.       Tenisha Valle, APRN - CNP

## 2022-11-01 NOTE — ED NOTES
Discharge instructions reviewed, patient verbalizes understanding. Denies questions/concerns at this time. Patient ambulatory out of ED in stable condition with all belongings.        Kaye Smith RN  11/01/22 0885

## 2022-11-01 NOTE — Clinical Note
Basil Armenta was seen and treated in our emergency department on 11/1/2022. She may return to work on 11/04/2022. Or sooner if feeling better     If you have any questions or concerns, please don't hesitate to call.       Cierra Walton, APRN - CNP

## 2022-11-01 NOTE — ED PROVIDER NOTES
[facility] [id number] [facility]          [facility]  [place]        Pt Name: Brennon Pham  MRN: [id number]  [place] 1985  Dateof evaluation: 11/1/2022  Provider: JAYE Abebe - CNP  PCP: Eda Vargas DO  ED Attending: No att. providers found    CHIEF COMPLAINT       Chief Complaint   Patient presents with    Nephrolithiasis     Patient has recurrent kidney stones and states she went to PCP and was prescribed flomax and now she states it hurts to pee and \"I feel like I'm getting worse not better\"         HISTORY OF PRESENTILLNESS   (Location/Symptom, Timing/Onset, Context/Setting, Quality, Duration, Modifying Factors, Severity)  Note limiting factors. Brennon Pham is a 40 y.o. female for right-sided flank pain. Onset was 1 week. Context includes patient reports she has a history of kidney stones. She reports that for the last week she had hematuria. Patient reports that she did see her primary care doctor a week ago and they put her on Flomax. Patient states that she was told if her pain got worse she needed to come to the ED. Patient reports that she did feel better few days ago however started feeling worse again yesterday. Patient states that she initially had a fever had nausea but those have since subsided. Alleviating factors include nothing. Aggravating factors include nothing. Pain is 9/10. Ibuprofen has been used for pain today. Nursing Notes were all reviewed and agreed with or any disagreements were addressed  in the HPI. REVIEW OF SYSTEMS    (2-9 systems for level 4, 10 or more for level 5)     Review of Systems   Constitutional:  Positive for fever. Negative for activity change and appetite change. HENT:  Negative for congestion, facial swelling, rhinorrhea and sore throat. Eyes:  Negative for visual disturbance. Respiratory:  Negative for shortness of breath.     Cardiovascular:  Negative for chest pain.   Gastrointestinal:  Positive for nausea. Negative for abdominal pain and vomiting. Genitourinary:  Positive for flank pain and hematuria. Negative for difficulty urinating, vaginal bleeding, vaginal discharge and vaginal pain. Musculoskeletal:  Negative for arthralgias and myalgias. Skin:  Negative for color change and rash. Neurological:  Negative for dizziness and light-headedness. Psychiatric/Behavioral:  Negative for agitation. All other systems reviewed and are negative. Positives and Pertinent negatives as per HPI. Except as noted above in the ROS, all other systems were reviewed and negative.        PAST MEDICAL HISTORY     Past Medical History:   Diagnosis Date    Cataract     Depression     Genital herpes     one outbreak in     Kidney stone     Lung nodule     Pityriasis versicolor     Psoriasis          SURGICAL HISTORY       Past Surgical History:   Procedure Laterality Date    EYE SURGERY      cataract Rt eye    INDUCED       KIDNEY STONE SURGERY      UPPER GASTROINTESTINAL ENDOSCOPY N/A 2021    EGD BIOPSY performed by Alpa Anderson MD at [facility]       Discharge Medication List as of 2022 11:32 AM        CONTINUE these medications which have NOT CHANGED    Details   tamsulosin (FLOMAX) 0.4 MG capsule Take 1 capsule by mouth daily, Disp-30 capsule, R-0Normal      metoclopramide (REGLAN) 10 MG tablet Take 1 tablet by mouth 3 times daily as needed (vomiting), Disp-15 tablet, R-0Print      FLUoxetine (PROZAC) 40 MG capsule TAKE 1 CAPSULE BY MOUTH EVERY DAY, Disp-90 capsule, R-1Normal      Norgestimate-Eth Estradiol (SPRINTEC 28 PO) Take 1 tablet by mouth dailyHistorical Med      Apremilast (OTEZLA) 30 MG TABS Take 30 mg by mouth 2 times daily Historical Med      Multiple Vitamins-Minerals (THERAPEUTIC MULTIVITAMIN-MINERALS) tablet Take 1 tablet by mouth dailyHistorical Med      Melatonin 5 MG CAPS Take by mouth nightly Historical Med               ALLERGIES     Penicillins    FAMILY HISTORY       Family History   Problem Relation Age of Onset    Stroke Father 62        1    Diabetes Sister 23        type 1     Diabetes Paternal Grandmother           SOCIAL HISTORY       Social History     Socioeconomic History    Marital status: Single     Spouse name: None    Number of children: None    Years of education: None    Highest education level: None   Tobacco Use    Smoking status: Former     Packs/day: 1.00     Years: 16.00     Pack years: 16.00     Types: Cigarettes     Quit date: 10/2020     Years since quittin.0    Smokeless tobacco: Never   Vaping Use    Vaping Use: Never used   Substance and Sexual Activity    Alcohol use: Not Currently     Comment: very rare    Drug use: Not Currently     Types: Marijuana Dennison Fogo)     Comment: 3-4x a week    Sexual activity: Yes       SCREENINGS    [place] Coma Scale  Eye Opening: Spontaneous  Best Verbal Response: Oriented  Best Motor Response: Obeys commands  Hemanth Coma Scale Score: 15        PHYSICAL EXAM  (up to 7 for level 4, 8 or more for level 5)     ED Triage Vitals [22 0900]   BP Temp Temp Source Heart Rate Resp SpO2 Height Weight   (!) 138/92 97.5 °F (36.4 °C) Oral 79 19 96 % 5' 7\" (1.702 m) 293 lb (132.9 kg)       Physical Exam  Constitutional:       Appearance: Normal appearance. She is well-developed. She is obese. HENT:      Head: Normocephalic and atraumatic. Cardiovascular:      Rate and Rhythm: Normal rate. Pulmonary:      Effort: Pulmonary effort is normal. No respiratory distress. Abdominal:      General: There is no distension. Palpations: Abdomen is soft. Tenderness: There is no abdominal tenderness. There is right CVA tenderness. There is no left CVA tenderness. Musculoskeletal:         General: Normal range of motion. Cervical back: Normal range of motion. Skin:     General: Skin is warm and dry.    Neurological:      Mental Status: She is alert and oriented to person, place, and time. DIAGNOSTIC RESULTS   LABS:    Labs Reviewed   COMPREHENSIVE METABOLIC PANEL W/ REFLEX TO MG FOR LOW K - Abnormal; Notable for the following components:       Result Value    Glucose 111 (*)     Total Protein 6.3 (*)     All other components within normal limits   URINALYSIS WITH REFLEX TO CULTURE - Abnormal; Notable for the following components:    Clarity, UA SL CLOUDY (*)     Blood, Urine MODERATE (*)     Leukocyte Esterase, Urine TRACE (*)     All other components within normal limits   MICROSCOPIC URINALYSIS - Abnormal; Notable for the following components:    RBC, UA 5-10 (*)     Epithelial Cells, UA 6-10 (*)     Bacteria, UA 4+ (*)     All other components within normal limits   CBC WITH AUTO DIFFERENTIAL   HCG, SERUM, QUALITATIVE           EKG: All EKG's are interpreted by the Emergency Department Physician who either signs or Co-signs this chart in the absence of a cardiologist.  Please see their note for interpretation of EKG. RADIOLOGY:     CT abdomen pelvis with IV contrast interpreted by radiologist for    Impression:    1. No acute process within the abdomen or pelvis. 2. No urinary stones or hydronephrosis. Interpretation per the Radiologist below, if available at the time of this note:    CT ABDOMEN PELVIS W IV CONTRAST Additional Contrast? None   Preliminary Result   1. No acute process within the abdomen or pelvis. 2. No urinary stones or hydronephrosis. No results found.       PROCEDURES   Unless otherwise noted below, none     Procedures           EMERGENCYDEPARTMENT COURSE and DIFFERENTIAL DIAGNOSIS/MDM:   Vitals:    Vitals:    11/01/22 0900 11/01/22 0932 11/01/22 1102 11/01/22 1132   BP: (!) 138/92 131/87 130/75 115/83   Pulse: 79 78 80 82   Resp: 19 18 18 18   Temp: 97.5 °F (36.4 °C)      TempSrc: Oral      SpO2: 96% 97% 96% 98%   Weight: 293 lb (132.9 kg)      Height: 5' 7\" (1.702 m)          Patient was given the following medications:  Medications   ketorolac (TORADOL) injection 15 mg (15 mg IntraVENous Given 11/1/22 0940)   ondansetron (ZOFRAN) injection 4 mg (4 mg IntraVENous Given 11/1/22 0940)   morphine sulfate (PF) injection 4 mg (4 mg IntraVENous Given 11/1/22 0940)   iopamidol (ISOVUE-370) 76 % injection 75 mL (75 mLs IntraVENous Given 11/1/22 1015)   cefdinir (OMNICEF) capsule 300 mg (300 mg Oral Given 11/1/22 1142)       Patient was seen and evaluated by myself. Patient here for right-sided flank pain. Patient reports that she had hematuria a week ago and was seen by her primary care doctor and was started on Flomax for the stone. Patient reports that she has had kidney stones in the past that presented this way. Patient states that she started feeling better however yesterday started feeling worse again. On exam she is awake and alert hemodynamically stable nontoxic in appearance. Patient does not have any abdominal discomfort but does have right flank pain. Lab values have been reviewed and interpreted. Patient was given Toradol Zofran and morphine. Abdominal CT was obtained. CT was reviewed and was found to be negative for any acute findings. Patient did have some bacteria in her urine so should be treated for UTI. I did inform the patient that her vital signs and her lab values are all reassuring however she did have hematuria so she could have either passed a stone or she could have a bladder infection. Patient will be started on antibiotics. She was given urology referral.  She was encouraged to return to the ED for worsening symptoms. She was also encouraged to follow-up with her primary care doctor in the next few days. Patient was ultimately discharged with all questions answered. Is this patient to be included in the SEP-1 Core Measure due to severe sepsis or septic shock?    No   Exclusion criteria - the patient is NOT to be included for SEP-1 Core Measure due to:  2+ SIRS criteria are not met       Patient was seen during the time of the [place] pandemic. N95 and appropriate PPE was worn during the visit. The patient tolerated their visit well. I have evaluated this patient. My supervising physician was available for consultation. The patient and / or the family were informed of the results of any tests, a time was given to answer questions, a plan was proposed and they agreed with plan. FINAL IMPRESSION      1. Flank pain, acute    2. Hematuria, unspecified type    3.  Urinary tract infection with hematuria, site unspecified          DISPOSITION/PLAN   DISPOSITION Decision To Discharge 11/01/2022 11:29:21 AM      PATIENT REFERRED TO:  DO Mihir Alcazar  [phone]    Schedule an appointment as soon as possible for a visit in 2 days  for re-evaluation    [facility]  [address]  [phone]    If symptoms worsen    Gina Duque MD  [address] [facility] [place] [address] [facility] [address] [facility]  [phone]    Schedule an appointment as soon as possible for a visit in 1 week  If symptoms worsen, for re-evaluation    DISCHARGE MEDICATIONS:  Discharge Medication List as of 11/1/2022 11:32 AM        START taking these medications    Details   cefdinir (OMNICEF) 300 MG capsule Take 1 capsule by mouth 2 times daily for 7 days, Disp-14 capsule, R-0Print      ondansetron (ZOFRAN) 4 MG tablet Take 1 tablet by mouth every 8 hours as needed for Nausea, Disp-20 tablet, R-0Print      oxybutynin (DITROPAN XL) 5 MG extended release tablet Take 1 tablet by mouth daily, Disp-30 tablet, R-3Print             DISCONTINUED MEDICATIONS:  Discharge Medication List as of 11/1/2022 11:32 AM        STOP taking these medications       albuterol sulfate HFA (PROVENTIL;VENTOLIN;PROAIR) 108 (90 Base) MCG/ACT inhaler Comments:   Reason for Stopping:                      (Please note that portions of this note were completed with a voice recognition program.  Efforts were made to edit the dictations but occasionally words are mis-transcribed.)    JAYE Palma CNP (electronically signed)         JAYE Palma CNP  11/01/22 6287

## 2022-12-17 DIAGNOSIS — F41.9 ANXIETY AND DEPRESSION: ICD-10-CM

## 2022-12-17 DIAGNOSIS — F32.A ANXIETY AND DEPRESSION: ICD-10-CM

## 2022-12-17 NOTE — TELEPHONE ENCOUNTER
Refill Request     CONFIRM preferrred pharmacy with the patient. If Mail Order Rx - Pend for 90 day refill. Last Seen: Last Seen Department: 10/25/2022  Last Seen by PCP: 4/19/2022    Last Written: 3/25/2022    If no future appointment scheduled, route STAFF MESSAGE with patient name to the [facility] for scheduling. Next Appointment:   Future Appointments   Date Time Provider Mathew Mathis   4/25/2023  4:00 PM DO AUBRIE Peñaloza [place] - DYD       Message sent to  to schedule appt with patient?   NO      Requested Prescriptions     Pending Prescriptions Disp Refills    FLUoxetine (PROZAC) 40 MG capsule [Pharmacy Med Name: FLUoxetine HCl Oral Capsule 40 MG] 90 capsule 1     Sig: TAKE 1 CAPSULE BY MOUTH EVERY DAY

## 2022-12-19 RX ORDER — FLUOXETINE HYDROCHLORIDE 40 MG/1
CAPSULE ORAL
Qty: 90 CAPSULE | Refills: 1 | Status: SHIPPED | OUTPATIENT
Start: 2022-12-19

## 2023-03-13 ENCOUNTER — PATIENT MESSAGE (OUTPATIENT)
Dept: FAMILY MEDICINE CLINIC | Age: 38
End: 2023-03-13

## 2023-03-13 DIAGNOSIS — I88.9 INGUINAL LYMPHADENITIS: Primary | ICD-10-CM

## 2023-03-13 RX ORDER — CEFDINIR 300 MG/1
600 CAPSULE ORAL DAILY
Qty: 20 CAPSULE | Refills: 0 | Status: SHIPPED | OUTPATIENT
Start: 2023-03-13 | End: 2023-03-14

## 2023-03-13 NOTE — TELEPHONE ENCOUNTER
From: Clara Fong  To: Dr. Barak Campuzano: 3/13/2023 7:27 AM EDT  Subject: Swollen lymph nodes     Good morning,    The swollen lymph nodes on my groin have doubled in size. I am in a lot of pain and it hurts to walk. Can you please prescribe me a stronger antibiotic than last time? I just started a new job last week and I am unable to miss any days at work.

## 2023-03-14 ENCOUNTER — HOSPITAL ENCOUNTER (EMERGENCY)
Age: 38
Discharge: HOME | End: 2023-03-14
Payer: COMMERCIAL

## 2023-03-14 VITALS
HEART RATE: 76 BPM | BODY MASS INDEX: 50.02 KG/M2 | RESPIRATION RATE: 16 BRPM | TEMPERATURE: 97 F | SYSTOLIC BLOOD PRESSURE: 136 MMHG | DIASTOLIC BLOOD PRESSURE: 92 MMHG | HEIGHT: 64 IN | OXYGEN SATURATION: 97 % | WEIGHT: 293 LBS

## 2023-03-14 DIAGNOSIS — L02.91 ABSCESS: Primary | ICD-10-CM

## 2023-03-14 PROCEDURE — 99283 EMERGENCY DEPT VISIT LOW MDM: CPT

## 2023-03-14 PROCEDURE — 6370000000 HC RX 637 (ALT 250 FOR IP): Performed by: NURSE PRACTITIONER

## 2023-03-14 PROCEDURE — 87070 CULTURE OTHR SPECIMN AEROBIC: CPT

## 2023-03-14 PROCEDURE — 87205 SMEAR GRAM STAIN: CPT

## 2023-03-14 PROCEDURE — 10061 I&D ABSCESS COMP/MULTIPLE: CPT

## 2023-03-14 PROCEDURE — 87077 CULTURE AEROBIC IDENTIFY: CPT

## 2023-03-14 RX ORDER — CEPHALEXIN 500 MG/1
500 CAPSULE ORAL ONCE
Status: COMPLETED | OUTPATIENT
Start: 2023-03-14 | End: 2023-03-14

## 2023-03-14 RX ORDER — CEPHALEXIN 500 MG/1
500 CAPSULE ORAL 3 TIMES DAILY
Qty: 30 CAPSULE | Refills: 0 | Status: SHIPPED | OUTPATIENT
Start: 2023-03-14 | End: 2023-03-24

## 2023-03-14 RX ORDER — SULFAMETHOXAZOLE AND TRIMETHOPRIM 800; 160 MG/1; MG/1
1 TABLET ORAL ONCE
Status: COMPLETED | OUTPATIENT
Start: 2023-03-14 | End: 2023-03-14

## 2023-03-14 RX ORDER — SULFAMETHOXAZOLE AND TRIMETHOPRIM 800; 160 MG/1; MG/1
2 TABLET ORAL 2 TIMES DAILY
Qty: 40 TABLET | Refills: 0 | Status: SHIPPED | OUTPATIENT
Start: 2023-03-14 | End: 2023-03-24

## 2023-03-14 RX ADMIN — CEPHALEXIN 500 MG: 500 CAPSULE ORAL at 14:54

## 2023-03-14 RX ADMIN — SULFAMETHOXAZOLE AND TRIMETHOPRIM 1 TABLET: 800; 160 TABLET ORAL at 14:54

## 2023-03-14 ASSESSMENT — PAIN DESCRIPTION - LOCATION: LOCATION: GROIN

## 2023-03-14 ASSESSMENT — PAIN - FUNCTIONAL ASSESSMENT: PAIN_FUNCTIONAL_ASSESSMENT: 0-10

## 2023-03-14 ASSESSMENT — LIFESTYLE VARIABLES
HOW MANY STANDARD DRINKS CONTAINING ALCOHOL DO YOU HAVE ON A TYPICAL DAY: PATIENT DOES NOT DRINK
HOW OFTEN DO YOU HAVE A DRINK CONTAINING ALCOHOL: NEVER

## 2023-03-14 ASSESSMENT — PAIN SCALES - GENERAL: PAINLEVEL_OUTOF10: 7

## 2023-03-14 ASSESSMENT — PAIN DESCRIPTION - DESCRIPTORS: DESCRIPTORS: ACHING

## 2023-03-14 ASSESSMENT — PAIN DESCRIPTION - ORIENTATION: ORIENTATION: LEFT

## 2023-03-15 ASSESSMENT — ENCOUNTER SYMPTOMS
SORE THROAT: 0
COUGH: 0
EYE PAIN: 0
DIARRHEA: 0
VOMITING: 0
RHINORRHEA: 0
NAUSEA: 0
BACK PAIN: 0
ABDOMINAL PAIN: 0
BLOOD IN STOOL: 0
SHORTNESS OF BREATH: 0

## 2023-03-15 NOTE — ED PROVIDER NOTES
[address]  EMERGENCY DEPARTMENT ENCOUNTER        Pt Name: Minerva Saint  MRN: [id number]  [place] 1985  Date of evaluation: 3/14/2023  Provider: JAYE Clarke - JANESSA  PCP: Derian Ching DO  Note Started: 12:25 PM EDT 3/15/23      HEIDE. I have evaluated this patient. My supervising physician was available for consultation. CHIEF COMPLAINT       Chief Complaint   Patient presents with    Lymphadenopathy     Left groin x 1 month, seen at PCP office was put on antibiotics for 10 day course and had a partial pap smear. Pt states that soon after completing antibiotics it has become larger and \"it burns. \"       HISTORY OF PRESENT ILLNESS: 1 or more Elements     History From: Patient     Limitations to history : None    Social Determinants Significantly Affecting Health : None    Chief Complaint: Lymphadenopathy  Minerva Saint is a 40 y.o. female who presents atient displays evidence of swelling to her left groin what is to her inguinal crease. Patient reported that she was recently diagnosed with a reactive lymph node was placed on doxycycline last month and unfortunately her symptoms returned back with redness and tenderness to the area 5 days ago. No fever no chills. Reports tenderness to touch in the area. Reports redness to skin to the area. Nursing Notes were all reviewed and agreed with or any disagreements were addressed in the HPI. REVIEW OF SYSTEMS :      Review of Systems   Constitutional:  Negative for chills, diaphoresis and fever. HENT:  Negative for congestion, ear pain, rhinorrhea and sore throat. Eyes:  Negative for pain and visual disturbance. Respiratory:  Negative for cough and shortness of breath. Cardiovascular:  Negative for chest pain and leg swelling. Gastrointestinal:  Negative for abdominal pain, blood in stool, diarrhea, nausea and vomiting.    Genitourinary:  Negative for difficulty urinating, dysuria, flank pain and frequency. Musculoskeletal:  Negative for back pain and neck pain. Skin:  Negative for rash and wound. Swelling redness to the left groin   Neurological:  Negative for dizziness and light-headedness. Positives and Pertinent negatives as per HPI. SURGICAL HISTORY     Past Surgical History:   Procedure Laterality Date    EYE SURGERY      cataract Rt eye    INDUCED       KIDNEY STONE SURGERY      UPPER GASTROINTESTINAL ENDOSCOPY N/A 2021    EGD BIOPSY performed by Bridget Hunt MD at [address] [facility]       Discharge Medication List as of 3/14/2023  2:56 PM        CONTINUE these medications which have NOT CHANGED    Details   clobetasol (TEMOVATE) 0.05 % ointment Apply topically 2 times daily. , Disp-45 g, R-2, Normal      FLUoxetine (PROZAC) 40 MG capsule TAKE 1 CAPSULE BY MOUTH EVERY DAY, Disp-90 capsule, R-1Normal      ondansetron (ZOFRAN) 4 MG tablet Take 1 tablet by mouth every 8 hours as needed for Nausea, Disp-20 tablet, R-0Print      Norgestimate-Eth Estradiol (SPRINTEC 28 PO) Take 1 tablet by mouth dailyHistorical Med      Multiple Vitamins-Minerals (THERAPEUTIC MULTIVITAMIN-MINERALS) tablet Take 1 tablet by mouth dailyHistorical Med      Melatonin 5 MG CAPS Take by mouth nightly Historical Med             ALLERGIES     Penicillins    FAMILYHISTORY       Family History   Problem Relation Age of Onset    Stroke Father 62        1    Diabetes Sister 23        type 1     Diabetes Paternal Grandmother         SOCIAL HISTORY       Social History     Tobacco Use    Smoking status: Former     Packs/day: 1.00     Years: 16.00     Pack years: 16.00     Types: Cigarettes     Quit date: 10/2020     Years since quittin.4    Smokeless tobacco: Never   Vaping Use    Vaping Use: Never used   Substance Use Topics    Alcohol use: Not Currently     Comment: very rare    Drug use: Not Currently     Types: Marijuana Aakash Spina)     Comment: 3-4x a week       SCREENINGS Hemanth Coma Scale  Eye Opening: Spontaneous  Best Verbal Response: Oriented  Best Motor Response: Obeys commands  Hemanth Coma Scale Score: 15                CIWA Assessment  BP: (!) 136/92  Heart Rate: 76           PHYSICAL EXAM  1 or more Elements     ED Triage Vitals [03/14/23 1415]   BP Temp Temp Source Heart Rate Resp SpO2 Height Weight   (!) 136/92 97 °F (36.1 °C) Axillary 76 16 97 % 5' 4\" (1.626 m) 300 lb (136.1 kg)       Physical Exam  Vitals and nursing note reviewed. Constitutional:       Appearance: Normal appearance. She is not toxic-appearing or diaphoretic. HENT:      Head: Normocephalic and atraumatic. Nose: Nose normal.   Eyes:      General:         Right eye: No discharge. Left eye: No discharge. Cardiovascular:      Rate and Rhythm: Normal rate and regular rhythm. Pulses: Normal pulses. Pulmonary:      Effort: Pulmonary effort is normal. No respiratory distress. Musculoskeletal:         General: Normal range of motion. Cervical back: Normal range of motion and neck supple. Skin:     General: Skin is warm and dry. Findings: Abscess present. Comments: 1.5 cm abscess to the right inguinal space. Mild erythema. No evidence of cellulitis. No drainage. Neurological:      General: No focal deficit present. Mental Status: She is alert and oriented to person, place, and time. Psychiatric:         Mood and Affect: Mood normal.         Behavior: Behavior normal.         DIAGNOSTIC RESULTS   LABS:    Labs Reviewed   CULTURE, WOUND    Narrative:     ORDER#: [id number]                          ORDERED BY: Juventino Moran  SOURCE: Abscess No site given              COLLECTED:  03/14/23 14:34  ANTIBIOTICS AT DINO.:                      RECEIVED :  03/14/23 14:56       When ordered only abnormal lab results are displayed. All other labs were within normal range or not returned as of this dictation. EKG:  When ordered, EKG's are interpreted by the Emergency Department Physician in the absence of a cardiologist.  Please see their note for interpretation of EKG. RADIOLOGY:   Non-plain film images such as CT, Ultrasound and MRI are read by the radiologist. Plain radiographic images are visualized and preliminarily interpreted by the ED Provider with the below findings:        Interpretation per the Radiologist below, if available at the time of this note:    No orders to display     No results found. No results found. PROCEDURES   Unless otherwise noted below, none     Incision/Drainage    Date/Time: 3/15/2023 12:32 PM  Performed by: JAYE Blair CNP  Authorized by: JAYE Blair CNP     Consent:     Consent obtained:  Verbal    Consent given by:  Patient    Risks discussed:  Bleeding, incomplete drainage, infection, pain and damage to other organs    Alternatives discussed:  No treatment and delayed treatment  Universal protocol:     Patient identity confirmed:  Verbally with patient and arm band  Location:     Type:  Abscess    Location:  Lower extremity    Lower extremity location:  Leg    Leg location:  L upper leg  Pre-procedure details:     Skin preparation:  Antiseptic wash and chlorhexidine  Sedation:     Sedation type:  None  Anesthesia:     Anesthesia method:  Local infiltration    Local anesthetic:  Lidocaine 1% WITH epi  Procedure type:     Complexity:  Simple  Procedure details:     Incision types:  Cruciate    Incision depth:  Dermal    Wound management:  Probed and deloculated, irrigated with saline and extensive cleaning    Drainage:  Purulent and serosanguinous    Drainage amount:   Moderate    Wound treatment:  Wound left open    Packing materials:  None  Post-procedure details:     Procedure completion:  Tolerated well, no immediate complications  Comments:      Culture obtained    CRITICAL CARE TIME (.cctime)       PAST MEDICAL HISTORY      has a past medical history of Cataract, Depression, Genital herpes, Kidney stone, Lung nodule, Pityriasis versicolor, and Psoriasis.     EMERGENCY DEPARTMENT COURSE and DIFFERENTIAL DIAGNOSIS/MDM:   Vitals:    Vitals:    03/14/23 1415   BP: (!) 136/92   Pulse: 76   Resp: 16   Temp: 97 °F (36.1 °C)   TempSrc: Axillary   SpO2: 97%   Weight: 300 lb (136.1 kg)   Height: 5' 4\" (1.626 m)       Patient was given the following medications:  Medications   cephALEXin (KEFLEX) capsule 500 mg (500 mg Oral Given 3/14/23 1454)   sulfamethoxazole-trimethoprim (BACTRIM DS;SEPTRA DS) 800-160 MG per tablet 1 tablet (1 tablet Oral Given 3/14/23 1454)             Is this patient to be included in the SEP-1 Core Measure due to severe sepsis or septic shock?   No   Exclusion criteria - the patient is NOT to be included for SEP-1 Core Measure due to:  Infection is not suspected        Chronic Conditions affecting care:    has a past medical history of Cataract, Depression, Genital herpes, Kidney stone, Lung nodule, Pityriasis versicolor, and Psoriasis.    CONSULTS: (Who and What was discussed)  None      Records Reviewed (External and Source)     CC/HPI Summary, DDx, ED Course, and Reassessment: Patient's abscess in the emerge department was incised and drained.  Patient's tetanus status up-to-date per patient.  Patient's purulent drainage was cultured via wound culture.  Patient was placed on Keflex Bactrim.  In the meantime we will go and plan for treatment plan and discharge follow-up with her family doctor.  Patient was provided strict return precautions here in the emergency department for any fever, vomiting, worsening pain, significant redness or swelling.  Also if she has any concerning symptoms please return back to the ER for evaluation.    Disposition Considerations (tests considered but not done, Admit vs D/C, Shared Decision Making, Pt Expectation of Test or Tx.):       I am the Primary Clinician of Record.  FINAL IMPRESSION      1. Abscess          DISPOSITION/PLAN     DISPOSITION Decision To Discharge  03/14/2023 02:45:08 PM      PATIENT REFERRED TO:  Stephen Suggs, DO  [address] [facility]  [phone]    Schedule an appointment as soon as possible for a visit       [place] [facility] [place] [facility]  [address] [address]  [phone]  Go to   If symptoms worsen      DISCHARGE MEDICATIONS:  Discharge Medication List as of 3/14/2023  2:56 PM        START taking these medications    Details   cephALEXin (KEFLEX) 500 MG capsule Take 1 capsule by mouth 3 times daily for 10 days, Disp-30 capsule, R-0Normal      sulfamethoxazole-trimethoprim (BACTRIM DS) 800-160 MG per tablet Take 2 tablets by mouth 2 times daily for 10 days, Disp-40 tablet, R-0Normal             DISCONTINUED MEDICATIONS:  Discharge Medication List as of 3/14/2023  2:56 PM        STOP taking these medications       cefdinir (OMNICEF) 300 MG capsule Comments:   Reason for Stopping:                      (Please note that portions of this note were completed with a voice recognition program.  Efforts were made to edit the dictations but occasionally words are mis-transcribed.)    JAYE Bermudez CNP (electronically signed)       JAYE Bermudez CNP  03/15/23 4400

## 2023-03-16 ENCOUNTER — NURSE TRIAGE (OUTPATIENT)
Dept: OTHER | Facility: CLINIC | Age: 38
End: 2023-03-16

## 2023-03-16 ENCOUNTER — TELEPHONE (OUTPATIENT)
Dept: FAMILY MEDICINE CLINIC | Age: 38
End: 2023-03-16

## 2023-03-16 NOTE — TELEPHONE ENCOUNTER
Patient: Abner Wood Patient : 1985      Patient call back number- [phone]     Spoke with: Bad Migraines, left hand going numb, vomiting and facial swelling. After starting ABX. Symptom onset has been constant for a time period of 2 day(s). Severity is described as moderate. Course of her symptoms over time is worsening. Does anything make the symptom(s) better or worse?- no    Have you experienced this before?-no    Any pertinent medical history? NO      Have you taken anything (prescriptions or OTC) to help with symptom(s)?- no   - If YES, did it help?- No      Is patient having pain? Yes   Location of the pain- Head and swelling  Describe the pain (sharp, stabbing, aching, burning, dull, etc)-- Pressure  Constant or intermittent- constant  Rate pain on a scale of 1 to 10: 10+  Does is it radiate to other areas or just in one spot?- nonradiating      Call Outcome/Determination of next steps for patient- Patient Instructed to go to ED as advised by provider    Advised to call back directly if there are further questions, or if these symptoms fail to improve as anticipated or worsen.       Plan of Care reviewed and discussed with PCP: Yes       Electronically signed by Rebecca Reynoso LPN on  at 7:01 PM

## 2023-03-16 NOTE — TELEPHONE ENCOUNTER
Location of patient: Umair Hadley call from [place] at [employer] [facility] [employer]. Subjective: Caller states \"medication reaction, facial swelling\"     Current Symptoms: was seen at end of February for groin swelling, took an antibiotic. After finishing the antibiotic, the swelling came back, and it was worse. Went to ED on Tuesday. She got an abscess near her vaginal area, was prescribed 2 antibiotics, now is having bad migraines, left hand going numb, vomiting, diarrhea, facial swelling. Hot flashes/cold sweats. Is allergic to PCN. Currently has facial swelling, left hand numbness, headache. Has not taken either antibiotic since last night. Onset: 2 days ago; sudden    Associated Symptoms: reduced activity, reduced appetite    Pain Severity: 10/10 headache, 6/10 hand numbness; pressure, numbness; constant    Temperature: n/a     What has been tried: Tylenol ES    LMP:  last week  Pregnant: NA    Recommended disposition:  Discuss with PCP and callback by Nurse within 1 hour  Writer called [facility], spoke with Gareth Bustamante, who then was transferring to Kathi Sheikh 171 advice provided, patient verbalizes understanding; denies any other questions or concerns; instructed to call back for any new or worsening symptoms. Writer provided warm transfer to [place], RN at [facility] for further assistance    Attention Provider: Thank you for allowing me to participate in the care of your patient. The patient was connected to triage in response to information provided to the ECC/PSC. Please do not respond through this encounter as the response is not directed to a shared pool.       Reason for Disposition   Caller has URGENT medicine question about med that PCP or specialist prescribed and triager unable to answer question    Protocols used: Medication Question Call-ADULT-[place]

## 2023-03-17 LAB
BACTERIA SPEC AEROBE CULT: ABNORMAL
GRAM STN SPEC: ABNORMAL
ORGANISM: ABNORMAL

## 2023-11-07 ENCOUNTER — OFFICE VISIT (OUTPATIENT)
Dept: FAMILY MEDICINE CLINIC | Age: 38
End: 2023-11-07

## 2023-11-07 VITALS
DIASTOLIC BLOOD PRESSURE: 80 MMHG | WEIGHT: 293 LBS | SYSTOLIC BLOOD PRESSURE: 122 MMHG | HEART RATE: 87 BPM | BODY MASS INDEX: 50.02 KG/M2 | HEIGHT: 64 IN | OXYGEN SATURATION: 98 %

## 2023-11-07 DIAGNOSIS — F33.42 RECURRENT MAJOR DEPRESSIVE DISORDER, IN FULL REMISSION (HCC): ICD-10-CM

## 2023-11-07 DIAGNOSIS — Z23 NEED FOR INFLUENZA VACCINATION: ICD-10-CM

## 2023-11-07 DIAGNOSIS — G47.33 OSA (OBSTRUCTIVE SLEEP APNEA): ICD-10-CM

## 2023-11-07 DIAGNOSIS — R73.09 ELEVATED GLUCOSE LEVEL: ICD-10-CM

## 2023-11-07 DIAGNOSIS — Z00.00 ENCOUNTER FOR ANNUAL PHYSICAL EXAM: ICD-10-CM

## 2023-11-07 DIAGNOSIS — Z00.00 ENCOUNTER FOR ANNUAL PHYSICAL EXAM: Primary | ICD-10-CM

## 2023-11-07 DIAGNOSIS — L40.9 PSORIASIS: ICD-10-CM

## 2023-11-07 DIAGNOSIS — F32.A ANXIETY AND DEPRESSION: ICD-10-CM

## 2023-11-07 DIAGNOSIS — F41.9 ANXIETY AND DEPRESSION: ICD-10-CM

## 2023-11-07 LAB
DEPRECATED RDW RBC AUTO: 13.4 % (ref 12.4–15.4)
HCT VFR BLD AUTO: 39.4 % (ref 36–48)
HGB BLD-MCNC: 13.9 G/DL (ref 12–16)
MCH RBC QN AUTO: 33.9 PG (ref 26–34)
MCHC RBC AUTO-ENTMCNC: 35.3 G/DL (ref 31–36)
MCV RBC AUTO: 96 FL (ref 80–100)
PLATELET # BLD AUTO: 248 K/UL (ref 135–450)
PMV BLD AUTO: 8.9 FL (ref 5–10.5)
RBC # BLD AUTO: 4.1 M/UL (ref 4–5.2)
WBC # BLD AUTO: 7.3 K/UL (ref 4–11)

## 2023-11-07 RX ORDER — CLOBETASOL PROPIONATE 0.5 MG/G
OINTMENT TOPICAL
Qty: 45 G | Refills: 2 | Status: SHIPPED | OUTPATIENT
Start: 2023-11-07

## 2023-11-07 RX ORDER — FLUOXETINE HYDROCHLORIDE 40 MG/1
CAPSULE ORAL
Qty: 90 CAPSULE | Refills: 1 | Status: SHIPPED | OUTPATIENT
Start: 2023-11-07

## 2023-11-07 ASSESSMENT — PATIENT HEALTH QUESTIONNAIRE - PHQ9
2. FEELING DOWN, DEPRESSED OR HOPELESS: 0
1. LITTLE INTEREST OR PLEASURE IN DOING THINGS: 0
7. TROUBLE CONCENTRATING ON THINGS, SUCH AS READING THE NEWSPAPER OR WATCHING TELEVISION: 0
SUM OF ALL RESPONSES TO PHQ QUESTIONS 1-9: 2
SUM OF ALL RESPONSES TO PHQ QUESTIONS 1-9: 2
SUM OF ALL RESPONSES TO PHQ9 QUESTIONS 1 & 2: 0
4. FEELING TIRED OR HAVING LITTLE ENERGY: 1
SUM OF ALL RESPONSES TO PHQ QUESTIONS 1-9: 2
SUM OF ALL RESPONSES TO PHQ QUESTIONS 1-9: 2
5. POOR APPETITE OR OVEREATING: 1
10. IF YOU CHECKED OFF ANY PROBLEMS, HOW DIFFICULT HAVE THESE PROBLEMS MADE IT FOR YOU TO DO YOUR WORK, TAKE CARE OF THINGS AT HOME, OR GET ALONG WITH OTHER PEOPLE: 0
3. TROUBLE FALLING OR STAYING ASLEEP: 0
8. MOVING OR SPEAKING SO SLOWLY THAT OTHER PEOPLE COULD HAVE NOTICED. OR THE OPPOSITE, BEING SO FIGETY OR RESTLESS THAT YOU HAVE BEEN MOVING AROUND A LOT MORE THAN USUAL: 0
6. FEELING BAD ABOUT YOURSELF - OR THAT YOU ARE A FAILURE OR HAVE LET YOURSELF OR YOUR FAMILY DOWN: 0
9. THOUGHTS THAT YOU WOULD BE BETTER OFF DEAD, OR OF HURTING YOURSELF: 0

## 2023-11-07 ASSESSMENT — ANXIETY QUESTIONNAIRES
GAD7 TOTAL SCORE: 0
4. TROUBLE RELAXING: 0
1. FEELING NERVOUS, ANXIOUS, OR ON EDGE: 0
6. BECOMING EASILY ANNOYED OR IRRITABLE: 0
IF YOU CHECKED OFF ANY PROBLEMS ON THIS QUESTIONNAIRE, HOW DIFFICULT HAVE THESE PROBLEMS MADE IT FOR YOU TO DO YOUR WORK, TAKE CARE OF THINGS AT HOME, OR GET ALONG WITH OTHER PEOPLE: NOT DIFFICULT AT ALL
5. BEING SO RESTLESS THAT IT IS HARD TO SIT STILL: 0
2. NOT BEING ABLE TO STOP OR CONTROL WORRYING: 0
7. FEELING AFRAID AS IF SOMETHING AWFUL MIGHT HAPPEN: 0
3. WORRYING TOO MUCH ABOUT DIFFERENT THINGS: 0

## 2023-11-07 NOTE — TELEPHONE ENCOUNTER
Refill Request     CONFIRM preferred pharmacy with the patient. If Mail Order Rx - Pend for 90 day refill. Last Seen: Last Seen Department: 11/7/2023  Last Seen by PCP: 11/7/2023    Last Written: 12/19/2022    If no future appointment scheduled:  Review the last OV with PCP and review information for follow-up visit,  Route STAFF MESSAGE with patient name to the [facility] for scheduling with the following information:            -  Timing of next visit           -  Visit type ie Physical, OV, etc           -  Diagnoses/Reason ie. COPD, HTN - Do not use MEDICATION, Follow-up or CHECK UP - Give reason for visit      Next Appointment:   No future appointments. Message sent to [facility] to schedule appt with patient?   NO      Requested Prescriptions     Pending Prescriptions Disp Refills    FLUoxetine (PROZAC) 40 MG capsule [Pharmacy Med Name: FLUoxetine HCl Oral Capsule 40 MG] 90 capsule 1     Sig: TAKE 1 CAPSULE BY MOUTH EVERY DAY

## 2023-11-07 NOTE — PROGRESS NOTES
Assessment:  Encounter Diagnoses   Name Primary? Encounter for annual physical exam Yes    Recurrent major depressive disorder, in full remission ([address])     Psoriasis     Body mass index (BMI) 50.0-59.9, adult (HCC)     Elevated glucose level     Need for influenza vaccination     NHAN (obstructive sleep apnea)        Plan:  1. Encounter for annual physical exam  Assessment & Plan:   Discussed routine health recommendations. Has had flu shot  Orders:  -     CBC; Future  -     Comprehensive Metabolic Panel; Future  2. Recurrent major depressive disorder, in full remission [facility])  Assessment & Plan:   Well controlled on current prozac 40mg. Will continue. 3. Psoriasis  Assessment & Plan:  Planning to follow up with dermatology. Otezla caused headaches and stopped. Will restart clobetasol and encouraged follow up. Orders:  -     clobetasol (TEMOVATE) 0.05 % ointment; Apply topically 2 times daily. , Disp-45 g, R-2, Normal  4. Body mass index (BMI) 50.0-59.9, adult [facility])  Assessment & Plan:  Discussed diet and exercise recommendations. Has previously been evaluated by bariatric surgery and did not pursue surgery. Orders:  -     Lipid Panel; Future  5. Elevated glucose level  -     Hemoglobin A1C; Future  6. Need for influenza vaccination  -     Influenza, FLUCELVAX, (age 10 mo+), IM, Preservative Free, 0.5 mL  7. NHAN (obstructive sleep apnea)  Assessment & Plan:  Encouraged increased compliance with CPAP         No follow-ups on file. Patient: Kiana Mcintyre is a 41 y.o. female who presents today with the following Chief Complaint(s):  Chief Complaint   Patient presents with    Annual Exam         HPI    Obesity  Scheduled Gastric bypass 10/11/21 however cancelled surgery  Not currently dieting and exercising  Drinking plenty of water, 1 soda a week    NHAN  On CPAP  Has not been using regularly  Getting 8 hours of sleep even without cpap.  Not feeling restorative    Depression  Prozac 40mg daily  Doing well

## 2023-11-08 PROBLEM — L40.9 PSORIASIS: Status: ACTIVE | Noted: 2023-11-08

## 2023-11-08 PROBLEM — E66.01 MORBID OBESITY WITH BMI OF 45.0-49.9, ADULT (HCC): Status: RESOLVED | Noted: 2018-12-26 | Resolved: 2023-11-08

## 2023-11-08 PROBLEM — R06.83 SNORING: Status: RESOLVED | Noted: 2021-04-29 | Resolved: 2023-11-08

## 2023-11-08 PROBLEM — Z00.00 ENCOUNTER FOR ANNUAL PHYSICAL EXAM: Status: ACTIVE | Noted: 2023-11-08

## 2023-11-08 LAB
ALBUMIN SERPL-MCNC: 4.1 G/DL (ref 3.4–5)
ALBUMIN/GLOB SERPL: 1.9 {RATIO} (ref 1.1–2.2)
ALP SERPL-CCNC: 77 U/L (ref 40–129)
ALT SERPL-CCNC: 20 U/L (ref 10–40)
ANION GAP SERPL CALCULATED.3IONS-SCNC: 11 MMOL/L (ref 3–16)
AST SERPL-CCNC: 12 U/L (ref 15–37)
BILIRUB SERPL-MCNC: 0.7 MG/DL (ref 0–1)
BUN SERPL-MCNC: 8 MG/DL (ref 7–20)
CALCIUM SERPL-MCNC: 8.7 MG/DL (ref 8.3–10.6)
CHLORIDE SERPL-SCNC: 101 MMOL/L (ref 99–110)
CHOLEST SERPL-MCNC: 161 MG/DL (ref 0–199)
CO2 SERPL-SCNC: 25 MMOL/L (ref 21–32)
CREAT SERPL-MCNC: 0.6 MG/DL (ref 0.6–1.1)
EST. AVERAGE GLUCOSE BLD GHB EST-MCNC: 108.3 MG/DL
GFR SERPLBLD CREATININE-BSD FMLA CKD-EPI: >60 ML/MIN/{1.73_M2}
GLUCOSE SERPL-MCNC: 88 MG/DL (ref 70–99)
HBA1C MFR BLD: 5.4 %
HDLC SERPL-MCNC: 30 MG/DL (ref 40–60)
LDLC SERPL CALC-MCNC: ABNORMAL MG/DL
LDLC SERPL-MCNC: 92 MG/DL
POTASSIUM SERPL-SCNC: 4 MMOL/L (ref 3.5–5.1)
PROT SERPL-MCNC: 6.3 G/DL (ref 6.4–8.2)
SODIUM SERPL-SCNC: 137 MMOL/L (ref 136–145)
TRIGL SERPL-MCNC: 347 MG/DL (ref 0–150)
VLDLC SERPL CALC-MCNC: ABNORMAL MG/DL

## 2023-11-08 ASSESSMENT — ENCOUNTER SYMPTOMS
CONSTIPATION: 0
VOMITING: 0
DIARRHEA: 0
SHORTNESS OF BREATH: 0
NAUSEA: 0

## 2023-11-08 NOTE — ASSESSMENT & PLAN NOTE
Discussed diet and exercise recommendations. Has previously been evaluated by bariatric surgery and did not pursue surgery.

## 2023-11-08 NOTE — ASSESSMENT & PLAN NOTE
Planning to follow up with dermatology. Otezla caused headaches and stopped. Will restart clobetasol and encouraged follow up.

## 2023-11-29 ENCOUNTER — TELEMEDICINE (OUTPATIENT)
Dept: PRIMARY CARE CLINIC | Age: 38
End: 2023-11-29
Payer: COMMERCIAL

## 2023-11-29 DIAGNOSIS — H92.02 ACUTE OTALGIA, LEFT: ICD-10-CM

## 2023-11-29 DIAGNOSIS — R59.1 LYMPHADENOPATHY: ICD-10-CM

## 2023-11-29 DIAGNOSIS — R50.9 FEVER OF UNKNOWN ORIGIN: ICD-10-CM

## 2023-11-29 DIAGNOSIS — J02.9 PHARYNGITIS, UNSPECIFIED ETIOLOGY: Primary | ICD-10-CM

## 2023-11-29 DIAGNOSIS — R05.1 ACUTE COUGH: ICD-10-CM

## 2023-11-29 PROCEDURE — 99213 OFFICE O/P EST LOW 20 MIN: CPT | Performed by: NURSE PRACTITIONER

## 2023-11-29 RX ORDER — CLINDAMYCIN HYDROCHLORIDE 300 MG/1
300 CAPSULE ORAL 3 TIMES DAILY
Qty: 30 CAPSULE | Refills: 0 | Status: SHIPPED | OUTPATIENT
Start: 2023-11-29 | End: 2023-12-09

## 2023-11-29 RX ORDER — BENZONATATE 200 MG/1
200 CAPSULE ORAL 3 TIMES DAILY PRN
Qty: 30 CAPSULE | Refills: 0 | Status: SHIPPED | OUTPATIENT
Start: 2023-11-29 | End: 2023-12-09

## 2023-11-29 ASSESSMENT — ENCOUNTER SYMPTOMS
SORE THROAT: 1
COUGH: 1

## 2023-11-29 NOTE — PROGRESS NOTES
Janay Fong, was evaluated through a synchronous (real-time) audio-video encounter. The patient (or guardian if applicable) is aware that this is a billable service, which includes applicable co-pays. This Virtual Visit was conducted with patient's (and/or legal guardian's) consent. Patient identification was verified, and a caregiver was present when appropriate. The patient was located at Home: [address]  Provider was located at [address] [place] [address] ([address] [place] [address]): [address] [place] [address] (:  1985) is a Established patient, presenting virtually for evaluation of the following:  Productive cough with green drainage and a noted putrid taste when coughing the sputum up, sore throat, fevers as high as 100.0, body aches, left ear pain and left lymph node tenderness  Symptoms started Monday  Has not done an at-home COVID test but will do 1 today  Assessment & Plan   Below is the assessment and plan developed based on review of pertinent history, physical exam, labs, studies, and medications. 1. Pharyngitis, unspecified etiology  Problem  -     clindamycin (CLEOCIN) 300 MG capsule; Take 1 capsule by mouth 3 times daily for 10 days, Disp-30 capsule, R-0Normal  Continue salt water gargles  Hot tea and honey as needed or tolerated  Continue sore throat lozenges  See patient instructions    2. Acute cough  Problem  -     benzonatate (TESSALON) 200 MG capsule; Take 1 capsule by mouth 3 times daily as needed for Cough, Disp-30 capsule, R-0Normal  See patient instructions    3. Fever of unknown origin  Problem  -     clindamycin (CLEOCIN) 300 MG capsule; Take 1 capsule by mouth 3 times daily for 10 days, Disp-30 capsule, R-0Normal  Take Tylenol or ibuprofen as the bottles direct  See patient instructions    4. Acute otalgia, left  Problem  -     clindamycin (CLEOCIN) 300 MG capsule;  Take 1 capsule by mouth 3 times daily for 10 days, Disp-30 capsule, R-0Normal  See patient

## 2023-12-08 PROBLEM — Z00.00 ENCOUNTER FOR ANNUAL PHYSICAL EXAM: Status: RESOLVED | Noted: 2023-11-08 | Resolved: 2023-12-08

## 2024-05-24 ENCOUNTER — TELEMEDICINE (OUTPATIENT)
Age: 39
End: 2024-05-24
Payer: COMMERCIAL

## 2024-05-24 DIAGNOSIS — F32.A ANXIETY AND DEPRESSION: ICD-10-CM

## 2024-05-24 DIAGNOSIS — B96.89 ACUTE BACTERIAL RHINOSINUSITIS: Primary | ICD-10-CM

## 2024-05-24 DIAGNOSIS — F41.9 ANXIETY AND DEPRESSION: ICD-10-CM

## 2024-05-24 DIAGNOSIS — J01.90 ACUTE BACTERIAL RHINOSINUSITIS: Primary | ICD-10-CM

## 2024-05-24 PROCEDURE — 99213 OFFICE O/P EST LOW 20 MIN: CPT | Performed by: NURSE PRACTITIONER

## 2024-05-24 RX ORDER — FLUOXETINE HYDROCHLORIDE 40 MG/1
CAPSULE ORAL
Qty: 90 CAPSULE | Refills: 0 | Status: SHIPPED | OUTPATIENT
Start: 2024-05-24

## 2024-05-24 RX ORDER — DOXYCYCLINE HYCLATE 100 MG
100 TABLET ORAL 2 TIMES DAILY
Qty: 14 TABLET | Refills: 0 | Status: SHIPPED | OUTPATIENT
Start: 2024-05-24 | End: 2024-05-31

## 2024-05-24 RX ORDER — BROMPHENIRAMINE MALEATE, PSEUDOEPHEDRINE HYDROCHLORIDE, AND DEXTROMETHORPHAN HYDROBROMIDE 2; 30; 10 MG/5ML; MG/5ML; MG/5ML
10 SYRUP ORAL 4 TIMES DAILY PRN
Qty: 200 ML | Refills: 0 | Status: SHIPPED | OUTPATIENT
Start: 2024-05-24

## 2024-05-24 ASSESSMENT — ENCOUNTER SYMPTOMS
SORE THROAT: 0
WHEEZING: 1
SWOLLEN GLANDS: 0
SINUS PAIN: 1
SHORTNESS OF BREATH: 1
COUGH: 1
RHINORRHEA: 1

## 2024-05-24 NOTE — TELEPHONE ENCOUNTER
Patient scheduled.   Dr. Lepe the patient would like to let you know that she has lost 40 lbs since last November. Due to diet and exercise.

## 2024-05-24 NOTE — PROGRESS NOTES
Objective:  Patient-Reported Vitals  No data recorded         11/29/2023     1:38 PM   Patient-Reported Vitals   Patient-Reported Weight 300 lbs   Patient-Reported Temperature 100.0 yest normal today        Physical Exam:  [INSTRUCTIONS:  \"[x]\" Indicates a positive item  \"[]\" Indicates a negative item  -- DELETE ALL ITEMS NOT EXAMINED]    Constitutional: [x] Appears well-developed and well-nourished [x] No apparent distress      [] Abnormal -     Mental status: [x] Alert and awake  [x] Oriented to person/place/time [x] Able to follow commands    [] Abnormal -     Eyes:   EOM    [x]  Normal    [] Abnormal -   Sclera  [x]  Normal    [] Abnormal -          Discharge [x]  None visible   [] Abnormal -     HENT: [x] Normocephalic, atraumatic  [] Abnormal -   [x] Mouth/Throat: Mucous membranes are moist    External Ears [x] Normal  [] Abnormal -    Neck: [x] No visualized mass [] Abnormal -     Pulmonary/Chest: [x] Respiratory effort normal   [x] No visualized signs of difficulty breathing or respiratory distress        [] Abnormal -      Musculoskeletal:   [] Normal gait with no signs of ataxia         [x] Normal range of motion of neck        [] Abnormal -     Neurological:        [x] No Facial Asymmetry (Cranial nerve 7 motor function) (limited exam due to video visit)          [x] No gaze palsy        [] Abnormal -          Skin:        [x] No significant exanthematous lesions or discoloration noted on facial skin         [] Abnormal -            Psychiatric:       [x] Normal Affect [] Abnormal -        [] No Hallucinations    Other pertinent observable physical exam findings:-        On this date 5/24/2024 I have spent 17 minutes reviewing previous notes, test results and face to face (virtual) with the patient discussing the diagnosis and importance of compliance with the treatment plan as well as documenting on the day of the visit.    Janay Fong was evaluated through a synchronous (real-time)

## 2024-05-24 NOTE — TELEPHONE ENCOUNTER
Refill Request     CONFIRM preferred pharmacy with the patient.    If Mail Order Rx - Pend for 90 day refill.      Last Seen: Last Seen Department: 11/7/2023  Last Seen by PCP: 11/7/2023    Last Written: 11/7/23 90 caps with 1 refill    If no future appointment scheduled:  Review the last OV with PCP and review information for follow-up visit,  Route STAFF MESSAGE with patient name to the  Pool for scheduling with the following information:            -  Timing of next visit           -  Visit type ie Physical, OV, etc           -  Diagnoses/Reason ie. COPD, HTN - Do not use MEDICATION, Follow-up or CHECK UP - Give reason for visit      Next Appointment:   No future appointments.    Message sent to  to schedule appt with patient?  NO No follow up dates on file      Requested Prescriptions     Pending Prescriptions Disp Refills    FLUoxetine (PROZAC) 40 MG capsule [Pharmacy Med Name: FLUoxetine HCl Oral Capsule 40 MG] 90 capsule 0     Sig: TAKE 1 CAPSULE BY MOUTH EVERY DAY

## 2024-09-02 DIAGNOSIS — F41.9 ANXIETY AND DEPRESSION: ICD-10-CM

## 2024-09-02 DIAGNOSIS — F32.A ANXIETY AND DEPRESSION: ICD-10-CM

## 2024-09-02 RX ORDER — FLUOXETINE 40 MG/1
CAPSULE ORAL
Qty: 90 CAPSULE | Refills: 3 | Status: SHIPPED | OUTPATIENT
Start: 2024-09-02

## 2024-09-02 NOTE — TELEPHONE ENCOUNTER
Refill Request     CONFIRM preferred pharmacy with the patient.    If Mail Order Rx - Pend for 90 day refill.      Last Seen: Last Seen Department: 11/7/2023  Last Seen by PCP: 11/7/2023    Last Written: 5/24/2024    If no future appointment scheduled:  Review the last OV with PCP and review information for follow-up visit,  Route STAFF MESSAGE with patient name to the  Pool for scheduling with the following information:            -  Timing of next visit           -  Visit type ie Physical, OV, etc           -  Diagnoses/Reason ie. COPD, HTN - Do not use MEDICATION, Follow-up or CHECK UP - Give reason for visit      Next Appointment:   Future Appointments   Date Time Provider Department Center   11/11/2024  4:00 PM Denzel Lepe, DO [facility] DEP       Message sent to  to schedule appt with patient?  NO      Requested Prescriptions     Pending Prescriptions Disp Refills    FLUoxetine (PROZAC) 40 MG capsule [Pharmacy Med Name: FLUoxetine HCl Oral Capsule 40 MG] 90 capsule 0     Sig: TAKE 1 CAPSULE BY MOUTH EVERY DAY

## 2024-11-08 ASSESSMENT — PATIENT HEALTH QUESTIONNAIRE - PHQ9
10. IF YOU CHECKED OFF ANY PROBLEMS, HOW DIFFICULT HAVE THESE PROBLEMS MADE IT FOR YOU TO DO YOUR WORK, TAKE CARE OF THINGS AT HOME, OR GET ALONG WITH OTHER PEOPLE: NOT DIFFICULT AT ALL
5. POOR APPETITE OR OVEREATING: MORE THAN HALF THE DAYS
8. MOVING OR SPEAKING SO SLOWLY THAT OTHER PEOPLE COULD HAVE NOTICED. OR THE OPPOSITE - BEING SO FIDGETY OR RESTLESS THAT YOU HAVE BEEN MOVING AROUND A LOT MORE THAN USUAL: NOT AT ALL
3. TROUBLE FALLING OR STAYING ASLEEP: NOT AT ALL
2. FEELING DOWN, DEPRESSED OR HOPELESS: NOT AT ALL
SUM OF ALL RESPONSES TO PHQ9 QUESTIONS 1 & 2: 0
6. FEELING BAD ABOUT YOURSELF - OR THAT YOU ARE A FAILURE OR HAVE LET YOURSELF OR YOUR FAMILY DOWN: NOT AT ALL
3. TROUBLE FALLING OR STAYING ASLEEP: NOT AT ALL
SUM OF ALL RESPONSES TO PHQ QUESTIONS 1-9: 2
5. POOR APPETITE OR OVEREATING: MORE THAN HALF THE DAYS
9. THOUGHTS THAT YOU WOULD BE BETTER OFF DEAD, OR OF HURTING YOURSELF: NOT AT ALL
6. FEELING BAD ABOUT YOURSELF - OR THAT YOU ARE A FAILURE OR HAVE LET YOURSELF OR YOUR FAMILY DOWN: NOT AT ALL
SUM OF ALL RESPONSES TO PHQ QUESTIONS 1-9: 2
1. LITTLE INTEREST OR PLEASURE IN DOING THINGS: NOT AT ALL
9. THOUGHTS THAT YOU WOULD BE BETTER OFF DEAD, OR OF HURTING YOURSELF: NOT AT ALL
4. FEELING TIRED OR HAVING LITTLE ENERGY: NOT AT ALL
7. TROUBLE CONCENTRATING ON THINGS, SUCH AS READING THE NEWSPAPER OR WATCHING TELEVISION: NOT AT ALL
SUM OF ALL RESPONSES TO PHQ QUESTIONS 1-9: 2
10. IF YOU CHECKED OFF ANY PROBLEMS, HOW DIFFICULT HAVE THESE PROBLEMS MADE IT FOR YOU TO DO YOUR WORK, TAKE CARE OF THINGS AT HOME, OR GET ALONG WITH OTHER PEOPLE: NOT DIFFICULT AT ALL
2. FEELING DOWN, DEPRESSED OR HOPELESS: NOT AT ALL
4. FEELING TIRED OR HAVING LITTLE ENERGY: NOT AT ALL
SUM OF ALL RESPONSES TO PHQ QUESTIONS 1-9: 2
7. TROUBLE CONCENTRATING ON THINGS, SUCH AS READING THE NEWSPAPER OR WATCHING TELEVISION: NOT AT ALL
SUM OF ALL RESPONSES TO PHQ QUESTIONS 1-9: 2
1. LITTLE INTEREST OR PLEASURE IN DOING THINGS: NOT AT ALL

## 2024-11-11 ENCOUNTER — OFFICE VISIT (OUTPATIENT)
Dept: FAMILY MEDICINE CLINIC | Age: 39
End: 2024-11-11

## 2024-11-11 VITALS
BODY MASS INDEX: 48.32 KG/M2 | HEIGHT: 64 IN | SYSTOLIC BLOOD PRESSURE: 120 MMHG | HEART RATE: 75 BPM | OXYGEN SATURATION: 99 % | WEIGHT: 283 LBS | DIASTOLIC BLOOD PRESSURE: 74 MMHG

## 2024-11-11 DIAGNOSIS — F33.42 RECURRENT MAJOR DEPRESSIVE DISORDER, IN FULL REMISSION (HCC): ICD-10-CM

## 2024-11-11 DIAGNOSIS — Z23 NEED FOR INFLUENZA VACCINATION: ICD-10-CM

## 2024-11-11 DIAGNOSIS — Z00.00 ENCOUNTER FOR WELL ADULT EXAM WITHOUT ABNORMAL FINDINGS: Primary | ICD-10-CM

## 2024-11-11 DIAGNOSIS — G47.33 OSA (OBSTRUCTIVE SLEEP APNEA): ICD-10-CM

## 2024-11-11 DIAGNOSIS — L40.9 PSORIASIS: ICD-10-CM

## 2024-11-11 DIAGNOSIS — Z72.0 TOBACCO ABUSE: ICD-10-CM

## 2024-11-11 RX ORDER — VARENICLINE TARTRATE 1 MG/1
1 TABLET, FILM COATED ORAL 2 TIMES DAILY
Qty: 60 TABLET | Refills: 5 | Status: SHIPPED | OUTPATIENT
Start: 2024-11-11

## 2024-11-11 RX ORDER — VARENICLINE TARTRATE 0.5 MG/1
.5-1 TABLET, FILM COATED ORAL SEE ADMIN INSTRUCTIONS
Qty: 57 TABLET | Refills: 0 | Status: SHIPPED | OUTPATIENT
Start: 2024-11-11

## 2024-11-11 RX ORDER — CLOBETASOL PROPIONATE 0.5 MG/G
OINTMENT TOPICAL
Qty: 45 G | Refills: 2 | Status: SHIPPED | OUTPATIENT
Start: 2024-11-11

## 2024-11-11 SDOH — ECONOMIC STABILITY: FOOD INSECURITY: WITHIN THE PAST 12 MONTHS, YOU WORRIED THAT YOUR FOOD WOULD RUN OUT BEFORE YOU GOT MONEY TO BUY MORE.: NEVER TRUE

## 2024-11-11 SDOH — ECONOMIC STABILITY: FOOD INSECURITY: WITHIN THE PAST 12 MONTHS, THE FOOD YOU BOUGHT JUST DIDN'T LAST AND YOU DIDN'T HAVE MONEY TO GET MORE.: NEVER TRUE

## 2024-11-11 SDOH — ECONOMIC STABILITY: INCOME INSECURITY: HOW HARD IS IT FOR YOU TO PAY FOR THE VERY BASICS LIKE FOOD, HOUSING, MEDICAL CARE, AND HEATING?: NOT HARD AT ALL

## 2024-11-11 ASSESSMENT — ENCOUNTER SYMPTOMS
CONSTIPATION: 0
DIARRHEA: 0
NAUSEA: 0
SHORTNESS OF BREATH: 0
VOMITING: 0

## 2024-11-11 NOTE — PROGRESS NOTES
RECOMBIVAX-HB, (age Birth - 19y), IM, 0.5mL 09/03/1998    Influenza Vaccine, unspecified formulation 09/14/2018    Influenza Virus Vaccine 11/24/2015, 09/06/2016    Influenza, AFLURIA (age 3 y+), FLUZONE, (age 6 mo+), Quadv MDV, 0.5mL 10/08/2020    Influenza, FLUARIX, FLULAVAL, FLUZONE (age 6 mo+) and AFLURIA, (age 3 y+), Quadv PF, 0.5mL 11/24/2015, 09/06/2016, 11/15/2017, 09/14/2018    Influenza, FLUCELVAX, (age 6 mo+), MDCK, Quadv PF, 0.5mL 10/25/2022, 11/07/2023    MMR, PRIORIX, M-M-R II, (age 12m+), SC, 0.5mL 09/26/1991, 10/01/1997    Pneumococcal, PPSV23, PNEUMOVAX 23, (age 2y+), SC/IM, 0.5mL 08/23/2019    Polio Virus Vaccine [birth year], 08/29/1991, 05/07/1992    TDaP, ADACEL (age 10y-64y), BOOSTRIX (age 10y+), IM, 0.5mL 05/23/2013, 11/15/2017        Health Maintenance Due   Topic Date Due    Hepatitis B vaccine (2 of 3 - 3-dose series) 10/01/1998    Flu vaccine (1) 08/01/2024    COVID-19 Vaccine (3 - 2023-24 season) 09/01/2024      Recommendations for Preventive Services Due: see orders and patient instructions/AVS.

## 2025-08-21 ENCOUNTER — PATIENT MESSAGE (OUTPATIENT)
Dept: FAMILY MEDICINE CLINIC | Age: 40
End: 2025-08-21

## (undated) DEVICE — SET VLV 3 PC AWS DISPOSABLE GRDIAN SCOPEVALET

## (undated) DEVICE — BW-412T DISP COMBO CLEANING BRUSH: Brand: SINGLE USE COMBINATION CLEANING BRUSH

## (undated) DEVICE — MOUTHPIECE ENDOSCP L CTRL OPN AND SIDE PORTS DISP

## (undated) DEVICE — PROCEDURE KIT ENDOSCP CUST

## (undated) DEVICE — FORCEPS BX L240CM WRK CHN 2.8MM STD CAP W/ NDL MIC MESH

## (undated) DEVICE — SOLUTION IV IRRIG WATER 500ML POUR BRL ST 2F7113